# Patient Record
Sex: MALE | Race: BLACK OR AFRICAN AMERICAN | NOT HISPANIC OR LATINO | Employment: OTHER | URBAN - METROPOLITAN AREA
[De-identification: names, ages, dates, MRNs, and addresses within clinical notes are randomized per-mention and may not be internally consistent; named-entity substitution may affect disease eponyms.]

---

## 2017-07-09 ENCOUNTER — HOSPITAL ENCOUNTER (OUTPATIENT)
Facility: HOSPITAL | Age: 82
Discharge: HOME | End: 2017-07-10
Attending: EMERGENCY MEDICINE | Admitting: HOSPITALIST
Payer: MEDICARE

## 2017-07-09 DIAGNOSIS — R79.89 ELEVATED BRAIN NATRIURETIC PEPTIDE (BNP) LEVEL: ICD-10-CM

## 2017-07-09 DIAGNOSIS — J81.0 ACUTE PULMONARY EDEMA: ICD-10-CM

## 2017-07-09 DIAGNOSIS — R79.89 ELEVATED TROPONIN: Primary | ICD-10-CM

## 2017-07-09 DIAGNOSIS — I50.9 ACUTE CHF (CONGESTIVE HEART FAILURE): ICD-10-CM

## 2017-07-09 DIAGNOSIS — I50.31 ACUTE DIASTOLIC CONGESTIVE HEART FAILURE: ICD-10-CM

## 2017-07-09 DIAGNOSIS — R05.9 COUGH: ICD-10-CM

## 2017-07-09 LAB
ALBUMIN SERPL BCP-MCNC: 3.4 G/DL
ALP SERPL-CCNC: 113 U/L
ALT SERPL W/O P-5'-P-CCNC: 9 U/L
AMYLASE SERPL-CCNC: 78 U/L
ANION GAP SERPL CALC-SCNC: 11 MMOL/L
AST SERPL-CCNC: 17 U/L
BASOPHILS # BLD AUTO: 0.01 K/UL
BASOPHILS NFR BLD: 0.2 %
BILIRUB SERPL-MCNC: 1.2 MG/DL
BNP SERPL-MCNC: 569 PG/ML
BUN SERPL-MCNC: 16 MG/DL
CALCIUM SERPL-MCNC: 10.1 MG/DL
CHLORIDE SERPL-SCNC: 105 MMOL/L
CO2 SERPL-SCNC: 26 MMOL/L
CREAT SERPL-MCNC: 1.1 MG/DL
DIFFERENTIAL METHOD: ABNORMAL
EOSINOPHIL # BLD AUTO: 0 K/UL
EOSINOPHIL NFR BLD: 0.6 %
ERYTHROCYTE [DISTWIDTH] IN BLOOD BY AUTOMATED COUNT: 14 %
EST. GFR  (AFRICAN AMERICAN): >60 ML/MIN/1.73 M^2
EST. GFR  (NON AFRICAN AMERICAN): 58 ML/MIN/1.73 M^2
GLUCOSE SERPL-MCNC: 111 MG/DL
HCT VFR BLD AUTO: 41 %
HGB BLD-MCNC: 13.2 G/DL
LIPASE SERPL-CCNC: 6 U/L
LYMPHOCYTES # BLD AUTO: 0.8 K/UL
LYMPHOCYTES NFR BLD: 16.9 %
MCH RBC QN AUTO: 30.2 PG
MCHC RBC AUTO-ENTMCNC: 32.2 %
MCV RBC AUTO: 94 FL
MONOCYTES # BLD AUTO: 0.2 K/UL
MONOCYTES NFR BLD: 4.9 %
NEUTROPHILS # BLD AUTO: 3.8 K/UL
NEUTROPHILS NFR BLD: 77.2 %
PLATELET # BLD AUTO: 153 K/UL
PMV BLD AUTO: 11.5 FL
POCT GLUCOSE: 118 MG/DL (ref 70–110)
POTASSIUM SERPL-SCNC: 4.1 MMOL/L
PROT SERPL-MCNC: 7.4 G/DL
RBC # BLD AUTO: 4.37 M/UL
SODIUM SERPL-SCNC: 142 MMOL/L
TROPONIN I SERPL DL<=0.01 NG/ML-MCNC: 0.04 NG/ML
WBC # BLD AUTO: 4.86 K/UL

## 2017-07-09 PROCEDURE — 93005 ELECTROCARDIOGRAM TRACING: CPT

## 2017-07-09 PROCEDURE — 99284 EMERGENCY DEPT VISIT MOD MDM: CPT | Mod: 25

## 2017-07-09 PROCEDURE — 85025 COMPLETE CBC W/AUTO DIFF WBC: CPT

## 2017-07-09 PROCEDURE — 83690 ASSAY OF LIPASE: CPT

## 2017-07-09 PROCEDURE — 25000003 PHARM REV CODE 250: Performed by: EMERGENCY MEDICINE

## 2017-07-09 PROCEDURE — 96374 THER/PROPH/DIAG INJ IV PUSH: CPT

## 2017-07-09 PROCEDURE — 96361 HYDRATE IV INFUSION ADD-ON: CPT

## 2017-07-09 PROCEDURE — 63600175 PHARM REV CODE 636 W HCPCS: Performed by: EMERGENCY MEDICINE

## 2017-07-09 PROCEDURE — G0378 HOSPITAL OBSERVATION PER HR: HCPCS

## 2017-07-09 PROCEDURE — 82150 ASSAY OF AMYLASE: CPT

## 2017-07-09 PROCEDURE — 80053 COMPREHEN METABOLIC PANEL: CPT

## 2017-07-09 PROCEDURE — 83880 ASSAY OF NATRIURETIC PEPTIDE: CPT

## 2017-07-09 PROCEDURE — 84484 ASSAY OF TROPONIN QUANT: CPT

## 2017-07-09 RX ORDER — DOCUSATE SODIUM 100 MG/1
100 CAPSULE, LIQUID FILLED ORAL DAILY
Status: ON HOLD | COMMUNITY
End: 2018-05-10 | Stop reason: HOSPADM

## 2017-07-09 RX ORDER — IBUPROFEN 200 MG
16 TABLET ORAL
Status: DISCONTINUED | OUTPATIENT
Start: 2017-07-09 | End: 2017-07-10

## 2017-07-09 RX ORDER — SERTRALINE HYDROCHLORIDE 50 MG/1
25 TABLET, FILM COATED ORAL NIGHTLY
Status: ON HOLD | COMMUNITY
End: 2018-05-10 | Stop reason: HOSPADM

## 2017-07-09 RX ORDER — FUROSEMIDE 10 MG/ML
40 INJECTION INTRAMUSCULAR; INTRAVENOUS
Status: COMPLETED | OUTPATIENT
Start: 2017-07-09 | End: 2017-07-09

## 2017-07-09 RX ORDER — METOPROLOL SUCCINATE 50 MG/1
50 TABLET, EXTENDED RELEASE ORAL 2 TIMES DAILY
Status: ON HOLD | COMMUNITY
End: 2017-07-10 | Stop reason: SDUPTHER

## 2017-07-09 RX ORDER — ASPIRIN 325 MG
325 TABLET ORAL
Status: COMPLETED | OUTPATIENT
Start: 2017-07-09 | End: 2017-07-09

## 2017-07-09 RX ORDER — ACETAMINOPHEN 325 MG/1
650 TABLET ORAL EVERY 6 HOURS PRN
Status: DISCONTINUED | OUTPATIENT
Start: 2017-07-09 | End: 2017-07-10 | Stop reason: HOSPADM

## 2017-07-09 RX ORDER — ACETAMINOPHEN 325 MG/1
325 TABLET ORAL EVERY 6 HOURS PRN
Status: ON HOLD | COMMUNITY
End: 2018-05-10 | Stop reason: HOSPADM

## 2017-07-09 RX ORDER — GLUCAGON 1 MG
1 KIT INJECTION
Status: DISCONTINUED | OUTPATIENT
Start: 2017-07-09 | End: 2017-07-10

## 2017-07-09 RX ORDER — PRAVASTATIN SODIUM 80 MG/1
80 TABLET ORAL DAILY
Status: ON HOLD | COMMUNITY
End: 2018-03-05 | Stop reason: HOSPADM

## 2017-07-09 RX ORDER — METOPROLOL SUCCINATE 25 MG/1
50 TABLET, EXTENDED RELEASE ORAL
Status: COMPLETED | OUTPATIENT
Start: 2017-07-09 | End: 2017-07-09

## 2017-07-09 RX ORDER — MIRTAZAPINE 15 MG/1
15 TABLET, FILM COATED ORAL NIGHTLY
Status: ON HOLD | COMMUNITY
End: 2018-05-10 | Stop reason: HOSPADM

## 2017-07-09 RX ORDER — ONDANSETRON 2 MG/ML
4 INJECTION INTRAMUSCULAR; INTRAVENOUS EVERY 12 HOURS PRN
Status: DISCONTINUED | OUTPATIENT
Start: 2017-07-09 | End: 2017-07-10 | Stop reason: HOSPADM

## 2017-07-09 RX ORDER — INSULIN ASPART 100 [IU]/ML
0-5 INJECTION, SOLUTION INTRAVENOUS; SUBCUTANEOUS
Status: DISCONTINUED | OUTPATIENT
Start: 2017-07-09 | End: 2017-07-10

## 2017-07-09 RX ORDER — IBUPROFEN 200 MG
24 TABLET ORAL
Status: DISCONTINUED | OUTPATIENT
Start: 2017-07-09 | End: 2017-07-10

## 2017-07-09 RX ADMIN — METOPROLOL SUCCINATE 50 MG: 25 TABLET, EXTENDED RELEASE ORAL at 07:07

## 2017-07-09 RX ADMIN — ASPIRIN 325 MG ORAL TABLET 325 MG: 325 PILL ORAL at 07:07

## 2017-07-09 RX ADMIN — SODIUM CHLORIDE 500 ML: 0.9 INJECTION, SOLUTION INTRAVENOUS at 06:07

## 2017-07-09 RX ADMIN — FUROSEMIDE 40 MG: 10 INJECTION, SOLUTION INTRAMUSCULAR; INTRAVENOUS at 07:07

## 2017-07-09 NOTE — ED NOTES
Pt presents to ED via family with c/o emesis since this AM after vomiting started at breakfast. Pt stepdaughter states he has since been vomiting thick, clear mucus. Per family, pt has hx of cardiac problems, DM and dementia. [facility] and [facility] was about 2 months ago. Pt placed on cardiac monitor, bp cuff and continuous pulse ox. Pt is tachycardic on monitor at present.

## 2017-07-09 NOTE — ED PROVIDER NOTES
Encounter Date: 7/9/2017       History     Chief Complaint   Patient presents with    Emesis     since this AM after eating breakfast. family reports pt currently spitting up thick, clear mucus     89-year-old male with advanced dementia on palliative care, hypertension, and CHF presents with coughing up thick mucus and breakfast this morning.  His daughter states after eating breakfast he began coughing up lots of mucus.  Symptoms have been constant and severe all day.  The patient has advanced dementia and is unable to give me further history at this time.          Review of patient's allergies indicates:  No Known Allergies  Past Medical History:   Diagnosis Date    CHF (congestive heart failure)     Hypertension      History reviewed. No pertinent surgical history.  History reviewed. No pertinent family history.  Social History   Substance Use Topics    Smoking status: Never Smoker    Smokeless tobacco: Not on file    Alcohol use No     Review of Systems   Unable to perform ROS: Dementia   Respiratory: Positive for cough.        Physical Exam     Initial Vitals [07/09/17 1717]   BP Pulse Resp Temp SpO2   (!) 174/86 (!) 127 20 98.4 °F (36.9 °C) 99 %      MAP       115.33         Physical Exam    Nursing note and vitals reviewed.  Constitutional: He appears well-developed and well-nourished. No distress.   HENT:   Head: Normocephalic and atraumatic.   Mouth/Throat: Oropharynx is clear and moist.   Eyes: Conjunctivae are normal.   Neck: Normal range of motion.   Cardiovascular: Regular rhythm. Tachycardia present.    Abdominal: Bowel sounds are normal. He exhibits no distension.   Course breath sounds, no respiratory distress   Musculoskeletal: Normal range of motion.   Neurological: He is alert. He has normal strength.   Skin: Skin is warm and dry.   Psychiatric:   Calm and cooperative but confused         ED Course   Procedures  Labs Reviewed   CBC W/ AUTO DIFFERENTIAL - Abnormal; Notable for the following:         Result Value    RBC 4.37 (*)     Hemoglobin 13.2 (*)     Lymph # 0.8 (*)     Mono # 0.2 (*)     Gran% 77.2 (*)     Lymph% 16.9 (*)     All other components within normal limits   COMPREHENSIVE METABOLIC PANEL - Abnormal; Notable for the following:     Glucose 111 (*)     Albumin 3.4 (*)     Total Bilirubin 1.2 (*)     ALT 9 (*)     eGFR if non  58 (*)     All other components within normal limits   TROPONIN I - Abnormal; Notable for the following:     Troponin I 0.044 (*)     All other components within normal limits   B-TYPE NATRIURETIC PEPTIDE - Abnormal; Notable for the following:      (*)     All other components within normal limits   AMYLASE   LIPASE     EKG Readings: (Independently Interpreted)   Initial Reading: No STEMI. Rhythm: Sinus Tachycardia. Heart Rate: 126. Conduction: RBBB. ST Segment Depression: V3 and V4. T Waves Flipped: III and AVF. Clinical Impression: Sinus Tachycardia          Medical Decision Making:   History:   I obtained history from: someone other than patient.  Independently Interpreted Test(s):   I have ordered and independently interpreted EKG Reading(s) - see prior notes  Clinical Tests:   Lab Tests: Ordered and Reviewed  Radiological Study: Ordered and Reviewed  Medical Tests: Reviewed and Ordered  ED Management:  82-year-old male with a history of advanced dementia on palliative care was brought in by his daughter for cough.  He is having coughing and bringing up a lot of mucus since breakfast this morning the patient was a course on exam and coughing.  He is not in any distress.  He was tachycardic on arrival and EKG showed sinus tachycardia.  This resolved without intervention.  His BP is also elevated and he is due for his evening metoprolol dose so that was given in the ER.    Patient is remarkable for elevated troponin and elevated BNP.  He has some pulmonary edema on his chest x-ray.  I feel his cough and mucus production may be due to this  pulmonary edema.  Given Lasix 40 mg IV.  The patient will be admitted to [facility] hospitalist for further evaluation.  Other:   I have discussed this case with another health care provider.                   ED Course     Clinical Impression:   The primary encounter diagnosis was Elevated troponin. Diagnoses of Cough, Elevated brain natriuretic peptide (BNP) level, and Acute pulmonary edema were also pertinent to this visit.                           Nieves Ferris MD  07/09/17 1756

## 2017-07-10 PROBLEM — I51.7 LEFT ATRIAL ENLARGEMENT: Status: ACTIVE | Noted: 2017-07-10

## 2017-07-10 PROBLEM — F03.C0 ADVANCED DEMENTIA: Status: ACTIVE | Noted: 2017-07-10

## 2017-07-10 PROBLEM — R79.89 ELEVATED TROPONIN: Status: ACTIVE | Noted: 2017-07-10

## 2017-07-10 PROBLEM — I10 ESSENTIAL HYPERTENSION: Status: ACTIVE | Noted: 2017-07-10

## 2017-07-10 PROBLEM — I50.31 ACUTE DIASTOLIC CONGESTIVE HEART FAILURE: Status: ACTIVE | Noted: 2017-07-09

## 2017-07-10 PROBLEM — E78.5 HYPERLIPEMIA: Status: ACTIVE | Noted: 2017-07-10

## 2017-07-10 LAB
ANION GAP SERPL CALC-SCNC: 12 MMOL/L
APTT BLDCRRT: 31.5 SEC
BUN SERPL-MCNC: 15 MG/DL
CALCIUM SERPL-MCNC: 10.3 MG/DL
CHLORIDE SERPL-SCNC: 103 MMOL/L
CHOLEST/HDLC SERPL: 3.4 {RATIO}
CO2 SERPL-SCNC: 28 MMOL/L
CREAT SERPL-MCNC: 1 MG/DL
DIASTOLIC DYSFUNCTION: YES
EST. GFR  (AFRICAN AMERICAN): >60 ML/MIN/1.73 M^2
EST. GFR  (NON AFRICAN AMERICAN): >60 ML/MIN/1.73 M^2
ESTIMATED AVG GLUCOSE: 105 MG/DL
ESTIMATED PA SYSTOLIC PRESSURE: 43.45
GLUCOSE SERPL-MCNC: 114 MG/DL
HBA1C MFR BLD HPLC: 5.3 %
HDL/CHOLESTEROL RATIO: 29.5 %
HDLC SERPL-MCNC: 193 MG/DL
HDLC SERPL-MCNC: 57 MG/DL
INR PPP: 1.2
LDLC SERPL CALC-MCNC: 123.4 MG/DL
MAGNESIUM SERPL-MCNC: 2.2 MG/DL
NONHDLC SERPL-MCNC: 136 MG/DL
POCT GLUCOSE: 110 MG/DL (ref 70–110)
POCT GLUCOSE: 122 MG/DL (ref 70–110)
POCT GLUCOSE: 137 MG/DL (ref 70–110)
POTASSIUM SERPL-SCNC: 3.9 MMOL/L
PROTHROMBIN TIME: 12.3 SEC
RETIRED EF AND QEF - SEE NOTES: 55 (ref 55–65)
SODIUM SERPL-SCNC: 143 MMOL/L
TRICUSPID VALVE REGURGITATION: ABNORMAL
TRIGL SERPL-MCNC: 63 MG/DL
TROPONIN I SERPL DL<=0.01 NG/ML-MCNC: 0.05 NG/ML
TSH SERPL DL<=0.005 MIU/L-ACNC: 1.16 UIU/ML

## 2017-07-10 PROCEDURE — 63600175 PHARM REV CODE 636 W HCPCS: Performed by: NURSE PRACTITIONER

## 2017-07-10 PROCEDURE — 36415 COLL VENOUS BLD VENIPUNCTURE: CPT

## 2017-07-10 PROCEDURE — 85730 THROMBOPLASTIN TIME PARTIAL: CPT

## 2017-07-10 PROCEDURE — 83735 ASSAY OF MAGNESIUM: CPT

## 2017-07-10 PROCEDURE — 80048 BASIC METABOLIC PNL TOTAL CA: CPT

## 2017-07-10 PROCEDURE — 84484 ASSAY OF TROPONIN QUANT: CPT | Mod: 91

## 2017-07-10 PROCEDURE — 80061 LIPID PANEL: CPT

## 2017-07-10 PROCEDURE — 83036 HEMOGLOBIN GLYCOSYLATED A1C: CPT

## 2017-07-10 PROCEDURE — 25000003 PHARM REV CODE 250: Performed by: NURSE PRACTITIONER

## 2017-07-10 PROCEDURE — 84443 ASSAY THYROID STIM HORMONE: CPT

## 2017-07-10 PROCEDURE — 25000003 PHARM REV CODE 250: Performed by: HOSPITALIST

## 2017-07-10 PROCEDURE — 93306 TTE W/DOPPLER COMPLETE: CPT

## 2017-07-10 PROCEDURE — 94761 N-INVAS EAR/PLS OXIMETRY MLT: CPT

## 2017-07-10 PROCEDURE — G0378 HOSPITAL OBSERVATION PER HR: HCPCS

## 2017-07-10 PROCEDURE — 85610 PROTHROMBIN TIME: CPT

## 2017-07-10 RX ORDER — DOCUSATE SODIUM 100 MG/1
100 CAPSULE, LIQUID FILLED ORAL DAILY
Status: DISCONTINUED | OUTPATIENT
Start: 2017-07-11 | End: 2017-07-10 | Stop reason: HOSPADM

## 2017-07-10 RX ORDER — FUROSEMIDE 40 MG/1
40 TABLET ORAL DAILY PRN
Qty: 30 TABLET | Refills: 0 | Status: ON HOLD | OUTPATIENT
Start: 2017-07-10 | End: 2018-05-10 | Stop reason: HOSPADM

## 2017-07-10 RX ORDER — HYDROCODONE BITARTRATE AND ACETAMINOPHEN 5; 325 MG/1; MG/1
1 TABLET ORAL EVERY 8 HOURS PRN
Status: DISCONTINUED | OUTPATIENT
Start: 2017-07-10 | End: 2017-07-10 | Stop reason: HOSPADM

## 2017-07-10 RX ORDER — MIRTAZAPINE 15 MG/1
15 TABLET, FILM COATED ORAL NIGHTLY
Status: DISCONTINUED | OUTPATIENT
Start: 2017-07-10 | End: 2017-07-10 | Stop reason: HOSPADM

## 2017-07-10 RX ORDER — HYDROCODONE BITARTRATE AND ACETAMINOPHEN 5; 325 MG/1; MG/1
1 TABLET ORAL EVERY 8 HOURS PRN
Status: ON HOLD | COMMUNITY
End: 2018-05-10 | Stop reason: HOSPADM

## 2017-07-10 RX ORDER — FAMOTIDINE 20 MG/1
20 TABLET, FILM COATED ORAL 2 TIMES DAILY
Status: DISCONTINUED | OUTPATIENT
Start: 2017-07-10 | End: 2017-07-10

## 2017-07-10 RX ORDER — SERTRALINE HYDROCHLORIDE 25 MG/1
25 TABLET, FILM COATED ORAL NIGHTLY
Status: DISCONTINUED | OUTPATIENT
Start: 2017-07-10 | End: 2017-07-10 | Stop reason: HOSPADM

## 2017-07-10 RX ORDER — HYDRALAZINE HYDROCHLORIDE 20 MG/ML
10 INJECTION INTRAMUSCULAR; INTRAVENOUS EVERY 6 HOURS PRN
Status: DISCONTINUED | OUTPATIENT
Start: 2017-07-10 | End: 2017-07-10 | Stop reason: HOSPADM

## 2017-07-10 RX ORDER — METOPROLOL TARTRATE 50 MG/1
50 TABLET ORAL 2 TIMES DAILY
Status: DISCONTINUED | OUTPATIENT
Start: 2017-07-10 | End: 2017-07-10 | Stop reason: HOSPADM

## 2017-07-10 RX ORDER — FAMOTIDINE 20 MG/1
20 TABLET, FILM COATED ORAL DAILY
Status: DISCONTINUED | OUTPATIENT
Start: 2017-07-10 | End: 2017-07-10 | Stop reason: HOSPADM

## 2017-07-10 RX ORDER — METOPROLOL TARTRATE 50 MG/1
50 TABLET ORAL 2 TIMES DAILY
Status: ON HOLD | COMMUNITY
End: 2018-03-05

## 2017-07-10 RX ORDER — PRAVASTATIN SODIUM 40 MG/1
80 TABLET ORAL DAILY
Status: DISCONTINUED | OUTPATIENT
Start: 2017-07-10 | End: 2017-07-10 | Stop reason: HOSPADM

## 2017-07-10 RX ORDER — FUROSEMIDE 10 MG/ML
20 INJECTION INTRAMUSCULAR; INTRAVENOUS DAILY
Status: DISCONTINUED | OUTPATIENT
Start: 2017-07-10 | End: 2017-07-10 | Stop reason: HOSPADM

## 2017-07-10 RX ADMIN — METOPROLOL TARTRATE 50 MG: 50 TABLET ORAL at 08:07

## 2017-07-10 RX ADMIN — HYDRALAZINE HYDROCHLORIDE 10 MG: 20 INJECTION INTRAMUSCULAR; INTRAVENOUS at 01:07

## 2017-07-10 RX ADMIN — HYDRALAZINE HYDROCHLORIDE 10 MG: 20 INJECTION INTRAMUSCULAR; INTRAVENOUS at 08:07

## 2017-07-10 RX ADMIN — FAMOTIDINE 20 MG: 20 TABLET, FILM COATED ORAL at 08:07

## 2017-07-10 RX ADMIN — SERTRALINE HYDROCHLORIDE 25 MG: 25 TABLET ORAL at 04:07

## 2017-07-10 RX ADMIN — MIRTAZAPINE 15 MG: 15 TABLET, FILM COATED ORAL at 08:07

## 2017-07-10 RX ADMIN — FUROSEMIDE 20 MG: 10 INJECTION, SOLUTION INTRAMUSCULAR; INTRAVENOUS at 08:07

## 2017-07-10 RX ADMIN — SERTRALINE HYDROCHLORIDE 25 MG: 25 TABLET ORAL at 08:07

## 2017-07-10 RX ADMIN — MIRTAZAPINE 15 MG: 15 TABLET, FILM COATED ORAL at 04:07

## 2017-07-10 RX ADMIN — PRAVASTATIN SODIUM 80 MG: 40 TABLET ORAL at 08:07

## 2017-07-10 NOTE — SUBJECTIVE & OBJECTIVE
Interval History: Pt resting comfortably on room air. No cough. No mucous noted. Discussed with daughter-in-law, Mary Anne Greer ([phone]).      Review of Systems   Unable to perform ROS: Dementia     Objective:     Vital Signs (Most Recent):  Temp: 98 °F (36.7 °C) (07/10/17 1124)  Pulse: 82 (07/10/17 1600)  Resp: 20 (07/10/17 1124)  BP: (!) 143/67 (07/10/17 1124)  SpO2: 97 % (07/10/17 1600) Vital Signs (24h Range):  Temp:  [96.8 °F (36 °C)-98.6 °F (37 °C)] 98 °F (36.7 °C)  Pulse:  [] 82  Resp:  [12-23] 20  SpO2:  [94 %-100 %] 97 %  BP: (100-193)/() 143/67     Weight: 57.3 kg (126 lb 5 oz)  Body mass index is 19.83 kg/m².    Intake/Output Summary (Last 24 hours) at 07/10/17 1622  Last data filed at 07/10/17 0956   Gross per 24 hour   Intake              125 ml   Output              606 ml   Net             -481 ml      Physical Exam   Constitutional: No distress.   Cardiovascular: Normal rate and regular rhythm.    Pulmonary/Chest: Effort normal and breath sounds normal. No respiratory distress. He has no wheezes. He has no rales.   Musculoskeletal: He exhibits edema.   Trace edema in bilateral feet.    Psychiatric:   Sleeping comfortably.    Nursing note and vitals reviewed.      Significant Labs:   A1C:   Recent Labs  Lab 07/10/17  0451   HGBA1C 5.3     BMP:   Recent Labs  Lab 07/10/17  0450   *      K 3.9      CO2 28   BUN 15   CREATININE 1.0   CALCIUM 10.3   MG 2.2     Lipid Panel:   Recent Labs  Lab 07/10/17  0450   CHOL 193   HDL 57   LDLCALC 123.4   TRIG 63   CHOLHDL 29.5     Troponin:   Recent Labs  Lab 07/10/17  0010 07/10/17  0451 07/10/17  1223   TROPONINI 0.053* 0.049* 0.054*     TSH:   Recent Labs  Lab 07/10/17  0450   TSH 1.161       Significant ImaginD Echo 7/10/2017  CONCLUSIONS     1 - Severe left atrial enlargement.     2 - Concentric remodeling.     3 - No wall motion abnormalities.     4 - Normal left ventricular systolic function (EF 55-60%).     5 -  Impaired LV relaxation, elevated LAP (grade 2 diastolic dysfunction).     6 - Normal right ventricular systolic function .     7 - Pulmonary hypertension. The estimated PA systolic pressure is 43 mmHg.     8 - Mild tricuspid regurgitation.

## 2017-07-10 NOTE — HOSPITAL COURSE
2D Echo 7/10/17: EF 55%, + diastolic dysfunction, + pulmonary HTN.  Pt resting comfortably on room air all day with no coughing documented.  Discussed with pt's daughter in law Mary Anne that she may give him furosemide as needed for similar symptoms in the future.  I also confirmed that he is on Palliative, not Hospice, Care and the family was told to go to nearest ED as needed.  Daughter-in-law will notify pt's PCP and the Palliative Care Agency of his admission and new diagnosis of diastolic congestive heart failure.

## 2017-07-10 NOTE — ASSESSMENT & PLAN NOTE
DNR  Patient lives with his son and daughter-in-law who confirm that patient is DNR. [facility] Continue home Mirtazapine 15 mg every evening, sertraline 25 mg daily

## 2017-07-10 NOTE — DISCHARGE SUMMARY
[facility] Hospital Medicine  Discharge Summary      Patient Name: Raghav Greer  MRN: [id number]  Admission Date: 7/9/2017  Hospital Length of Stay: 0 days  Discharge Date and Time:  07/10/2017 6:22 PM  Attending Physician: Daniel Schaefer MD   Discharging Provider: Mabel Del Castillo PA-C  Primary Care Provider: No primary care provider on file.      HPI:   Mr. Raghav Greer is 89-year-old male with advanced dementia on palliative care, DNR (Advanced Directive on Chart), Diabetes, hypertension, hyperlipemia. He lives with his son and daughter-n-law.  [facility] and his last check up was about 2 months ago.    He presented to [facility] ED on 7/9/2017 with complaints of coughing up thick mucus beginning at breakfast. ED workup revealed a troponin of 0.044 and  with mild pulmonary congestion on chest x-ray.  All other labs essentially unremarkable.  He was admitted to [facility] Observation, for Congestive Heart Failure and elevated Troponin.    * No surgery found *      Indwelling Lines/Drains at time of discharge:   Lines/Drains/Airways          No matching active lines, drains, or airways        Hospital Course:   2D Echo 7/10/17: EF 55%, + diastolic dysfunction, + pulmonary HTN.  Pt resting comfortably on room air all day with no coughing documented.  Discussed with pt's daughter in law Mary Anne that she may give him furosemide as needed for similar symptoms in the future.  I also confirmed that he is on Palliative, not Hospice, Care and the family was told to go to nearest ED as needed.  Daughter-in-law will notify pt's PCP and the Palliative Care Agency of his admission and new diagnosis of diastolic congestive heart failure.      Consults:   Consults         Status Ordering Provider     Inpatient consult to Social Work  Once     Provider:  (Not yet assigned)    Acknowledged DUSTY RENEE          Significant Diagnostic Studies: Labs:   CMP    Recent Labs  Lab 07/09/17  1758 07/10/17  0450    143   K 4.1 3.9    103   CO2 26 28   * 114*   BUN 16 15   CREATININE 1.1 1.0   CALCIUM 10.1 10.3   PROT 7.4  --    ALBUMIN 3.4*  --    BILITOT 1.2*  --    ALKPHOS 113  --    AST 17  --    ALT 9*  --    ANIONGAP 11 12   ESTGFRAFRICA >60 >60   EGFRNONAA 58* >60   , CBC   Recent Labs  Lab 07/09/17  1758   WBC 4.86   HGB 13.2*   HCT 41.0      , Lipid Panel   Lab Results   Component Value Date    CHOL 193 07/10/2017    HDL 57 07/10/2017    LDLCALC 123.4 07/10/2017    TRIG 63 07/10/2017    CHOLHDL 29.5 07/10/2017   , Troponin   Recent Labs  Lab 07/10/17  1223   TROPONINI 0.054*   , A1C:   Recent Labs  Lab 07/10/17  0451   HGBA1C 5.3     TSH 1.161    Imaging Results          X-Ray Chest 1 View (Final result)  Result time 07/09/17 18:38:39    Final result by Nirav Turner MD (07/09/17 18:38:39)                 Impression:       1.  Cardiomegaly with enlargement of the pulmonary arteries and changes of mild pulmonary edema.     2.  No focal consolidations to suggest lobar pneumonia.              Electronically signed by: NIRAV TURNER MD  Date:     07/09/17  Time:    18:38              Narrative:    Exam: [id number]  07/09/17  18:03:00 IMG34 ([facility]) : XR CHEST 1 VIEW    Technique:    Single frontal chest x-ray    Comparison:     None     Findings:      Monitoring EKG leads are present.  The trachea is unremarkable.  There is enlargement of the cardiomediastinal silhouette.  There are calcifications of the aortic knob.  There is enlargement of the pulmonary arteries.  The hemidiaphragms are unremarkable.  There is no evidence of free air beneath the hemidiaphragms.  There are no pleural effusions.  There is no evidence of a pneumothorax.  There is no evidence of pneumomediastinum. No airspace opacities are present.  There is prominence of the pulmonary interstitium. The patient is status post median sternotomy.  There are degenerative changes in the  osseous structures.                            2D Echo 7/10/2017  CONCLUSIONS     1 - Severe left atrial enlargement.     2 - Concentric remodeling.     3 - No wall motion abnormalities.     4 - Normal left ventricular systolic function (EF 55-60%).     5 - Impaired LV relaxation, elevated LAP (grade 2 diastolic dysfunction).     6 - Normal right ventricular systolic function .     7 - Pulmonary hypertension. The estimated PA systolic pressure is 43 mmHg.     8 - Mild tricuspid regurgitation.     Pending Diagnostic Studies:     None        Final Active Diagnoses:    Diagnosis Date Noted POA    PRINCIPAL PROBLEM:  Acute diastolic congestive heart failure [I50.31] 07/09/2017 Yes    Advanced dementia [F03.90] 07/10/2017 Yes    Hyperlipemia [E78.5] 07/10/2017 Yes    Essential hypertension [I10] 07/10/2017 Yes    Elevated troponin [R74.8] 07/10/2017 Yes    Left atrial enlargement [I51.7] 07/10/2017 Yes      Problems Resolved During this Admission:    Diagnosis Date Noted Date Resolved POA      Elevated troponin    Troponin on admit 0.044. Trended 0.053 >> 0.049 >> 0.054.  Daughter-in-law denies that he looked in pain or clutched his chest.  No STEMI. Monitored on telemetry with no events.  Likely elevated 2/2 demand ischemia from heart failure.         Essential hypertension    Continue home metoprolol tartrate 50 mg BID           Hyperlipemia    Continue home pravastatin 80 mg daily. Lipid panel:  , HDL 57, , TG 63.           Advanced dementia    DNR  Patient lives with his son and daughter-in-law who confirm that patient is DNR. [facility] Continue home Mirtazapine 15 mg every evening, sertraline 25 mg daily          * Acute diastolic congestive heart failure    Echo confirms diastolic dysfunction and pulmonary HTN.  Patient presented with 1 day of coughing up copious clear-white sputum.  with mild pulmonary congestion on chest x-ray, mild crackles on  exam. He was not on diuretics at home.  Symptoms improved with IV lasix.  Will discharge with PO Lasix 20 mg daily as needed for cough, dyspnea or weight gain.                  Discharged Condition: stable    Disposition: Home or Self Care    Follow Up:  Follow-up Information     [facility]    Why:  as scheduled.               Patient Instructions:     Diet general   Order Specific Question Answer Comments   Na restriction, if any: 2gNa      Activity as tolerated     Call MD for:  difficulty breathing or increased cough       Medications:  Reconciled Home Medications:   Current Discharge Medication List      START taking these medications    Details   furosemide (LASIX) 40 MG tablet Take 1 tablet (40 mg total) by mouth daily as needed (cough with copious white mucous).  Qty: 30 tablet, Refills: 0    Associated Diagnoses: Acute diastolic congestive heart failure         CONTINUE these medications which have NOT CHANGED    Details   acetaminophen (TYLENOL) 325 MG tablet Take 325 mg by mouth every 6 (six) hours as needed for Pain.      docusate sodium (COLACE) 100 MG capsule Take 100 mg by mouth once daily at 6am.      hydrocodone-acetaminophen 5-325mg (NORCO) 5-325 mg per tablet Take 1 tablet by mouth every 8 (eight) hours as needed for Pain.      metoprolol tartrate (LOPRESSOR) 50 MG tablet Take 50 mg by mouth 2 (two) times daily.      mirtazapine (REMERON) 15 MG tablet Take 15 mg by mouth every evening.      pravastatin (PRAVACHOL) 80 MG tablet Take 80 mg by mouth once daily.      ranitidine (ZANTAC) 150 MG tablet Take 150 mg by mouth 2 (two) times daily.      sertraline (ZOLOFT) 50 MG tablet Take 25 mg by mouth every evening.            Time spent on the discharge of patient: 50 minutes    Mabel Del Castillo PA-C  Department of Hospital Medicine  [facility]  Pager: [phone]    Attending: Daniel Schaefer MD

## 2017-07-10 NOTE — PLAN OF CARE
Problem: Patient Care Overview  Goal: Plan of Care Review  Outcome: Outcome(s) achieved Date Met: 07/10/17  Pt on RA with sats of 95%.  Will continue to monitor.

## 2017-07-10 NOTE — PLAN OF CARE
TN spoke with son and daughter in law (who is coming to pick patient up).  They are going to call their [facility] [employer] [facility] once they arrive home to restart palliative care.     07/10/17 1716   Final Note   Assessment Type Final Discharge Note   Discharge Disposition Home-Health   Discharge planning education complete? Yes   What phone number can be called within the next 1-3 days to see how you are doing after discharge? [phone]   Hospital Follow Up  Appt(s) scheduled? Yes   Discharge plans and expectations educations in teach back method with documentation complete? Yes   Offered [facility] Bedside Delivery? n/a   Discharge/Hospital Encounter Summary to (non-[facility]) PCP n/a   Referral to Outpatient Case Management complete? No   Referral to / orders for Home Health Complete? n/a   Any social issues identified prior to discharge? No   Did you assess the readiness or willingness of the family or caregiver to support self management of care? Yes   Right Care Referral Info   Post Acute Recommendation Home-care

## 2017-07-10 NOTE — SUBJECTIVE & OBJECTIVE
Past Medical History:   Diagnosis Date    CHF (congestive heart failure)     Diabetes mellitus     Hypertension        History reviewed. No pertinent surgical history.    Review of patient's allergies indicates:  No Known Allergies    No current facility-administered medications on file prior to encounter.      No current outpatient prescriptions on file prior to encounter.     Family History     None        Social History Main Topics    Smoking status: Unknown If Ever Smoked    Smokeless tobacco: Not on file      Comment: Pt unable to answer questions.    Alcohol use No      Comment: Pt unable to answer questions.    Drug use: Unknown      Comment: Pt unable to answer questions.    Sexual activity: Not on file      Comment: Pt unable to answer questions.     Review of Systems   Unable to perform ROS: Dementia (No Family at Bedside at time of exam)     Objective:     Vital Signs (Most Recent):  Temp: 96.8 °F (36 °C) (07/10/17 0010)  Pulse: 82 (07/10/17 0010)  Resp: 16 (07/10/17 0010)  BP: (!) 191/88 (07/10/17 0010)  SpO2: 96 % (07/09/17 2330) Vital Signs (24h Range):  Temp:  [96.8 °F (36 °C)-98.6 °F (37 °C)] 96.8 °F (36 °C)  Pulse:  [] 82  Resp:  [12-23] 16  SpO2:  [96 %-100 %] 96 %  BP: (147-193)/() 191/88     Weight: 57.3 kg (126 lb 5 oz)  Body mass index is 19.83 kg/m².    Physical Exam   Constitutional: He is oriented to person, place, and time. He appears cachectic. No distress. Nasal cannula in place.   HENT:   Head: Normocephalic and atraumatic.   Mouth/Throat: No oropharyngeal exudate.   Eyes: Conjunctivae are normal. Pupils are equal, round, and reactive to light. No scleral icterus.   Neck: Neck supple. No tracheal deviation present.   Cardiovascular: Normal rate, regular rhythm, normal heart sounds and intact distal pulses.    No murmur heard.  Pulmonary/Chest: Effort normal. No respiratory distress. He has no wheezes. He has rales (Fine Rales at bases). He exhibits no tenderness.    Abdominal: Soft. Bowel sounds are normal. He exhibits no distension and no mass. There is no tenderness.   Musculoskeletal: He exhibits no edema or deformity.   Neurological: He is alert and oriented to person, place, and time.   Skin: Skin is warm and dry. Capillary refill takes 2 to 3 seconds. No rash noted. He is not diaphoretic. No erythema. No pallor.   Psychiatric: His affect is labile. He is agitated. Cognition and memory are impaired. He expresses inappropriate judgment.   Nursing note and vitals reviewed.       Significant Labs:   CBC:   Recent Labs  Lab 07/09/17 1758   WBC 4.86   HGB 13.2*   HCT 41.0        CMP:   Recent Labs  Lab 07/09/17 1758      K 4.1      CO2 26   *   BUN 16   CREATININE 1.1   CALCIUM 10.1   PROT 7.4   ALBUMIN 3.4*   BILITOT 1.2*   ALKPHOS 113   AST 17   ALT 9*   ANIONGAP 11   EGFRNONAA 58*     POCT Glucose:   Recent Labs  Lab 07/09/17  2233   POCTGLUCOSE 118*     Troponin:   Recent Labs  Lab 07/09/17  1758 07/10/17  0010   TROPONINI 0.044* 0.053*       Significant Imaging: I have reviewed all pertinent imaging results/findings within the past 24 hours.   Imaging Results          X-Ray Chest 1 View (Final result)  Result time 07/09/17 18:38:39    Final result by Nirav Turner MD (07/09/17 18:38:39)                 Impression:       1.  Cardiomegaly with enlargement of the pulmonary arteries and changes of mild pulmonary edema.     2.  No focal consolidations to suggest lobar pneumonia.              Electronically signed by: NIRAV TURNER MD  Date:     07/09/17  Time:    18:38              Narrative:    Exam: [id number]  07/09/17  18:03:00 IMG34 ([facility]) : XR CHEST 1 VIEW    Technique:    Single frontal chest x-ray    Comparison:     None     Findings:      Monitoring EKG leads are present.  The trachea is unremarkable.  There is enlargement of the cardiomediastinal silhouette.  There are calcifications of the aortic knob.  There is enlargement of the pulmonary  arteries.  The hemidiaphragms are unremarkable.  There is no evidence of free air beneath the hemidiaphragms.  There are no pleural effusions.  There is no evidence of a pneumothorax.  There is no evidence of pneumomediastinum. No airspace opacities are present.  There is prominence of the pulmonary interstitium. The patient is status post median sternotomy.  There are degenerative changes in the osseous structures.

## 2017-07-10 NOTE — ASSESSMENT & PLAN NOTE
Troponin on admit 0.044. Trended 0.053 >> 0.049 >> 0.054.  Daughter-in-law denies that he looked in pain or clutched his chest.  No STEMI. Monitor on telemetry.

## 2017-07-10 NOTE — ASSESSMENT & PLAN NOTE
DNR  Patient lives with his son and daughter-n-law who confirm that patient is DNR. [facility]. Patient is confused and somewhat combative at times.  His mood is labile.  --Continue home Mirtazapine 15 mg every evening, sertraline 25 mg daily

## 2017-07-10 NOTE — ASSESSMENT & PLAN NOTE
Troponin on admit 0.044. Trended 0.053 >> 0.049 >> 0.054.  Daughter-in-law denies that he looked in pain or clutched his chest.  No STEMI. Monitored on telemetry with no events.  Likely elevated 2/2 demand ischemia from heart failure.

## 2017-07-10 NOTE — ED NOTES
Educated pt. on medication indication, AE, SE, reactions and expected outcomes. Pt. verbalizes understanding. Agrees to treatment plan. Denies allergy. Will continue to monitor.

## 2017-07-10 NOTE — ED NOTES
Daughter in law at bedside. Updated daughter in law on pt's plan of care. Family verbalizes understanding. Family states pt. is a DNR and she is the pt' medical power of .

## 2017-07-10 NOTE — MEDICAL/APP STUDENT
"Progress Note  [facility]      Admit Date: 7/9/2017 (LOS 1 day)    SUBJECTIVE:     Follow-up For:  Acute diastolic congestive heart failure    HPI/Interval history: Mr. Greer is a 89 yr old cachectic black male with PMHX of dementia (on palliative care and is DNR), Diabetes, HTN, and CHF who presented with daughter-in-law to [facility] ED on 7/9 with chief complaint of vomiting large thick amounts of clear sputum which started after breakfast on 7/9. [facility] Gre[facility] and [facility] was approximately 2 months ago. BNP at 569 and troponin fluctuating between 0.044-0.054. Chest X ray on 7/9 revealed cardiomegaly with enlargement of pulmonary arteries and mild pulmonary edema.       Review of Systems:    ROS could not be performed as patient has dementia.    OBJECTIVE: Mr. Greer is a 89 yr old cachectic black male sleeping in supine position in the hospital bed with no signs of acute distress. He is afebrile with no pallor or diaphoresis noted. His respiratory rate and effort are not labored with no use of accessory muscles. Oropharynx is clear with no sputum or discharge is noted.      Vital Signs (Most Recent)  Temp: 98 °F (36.7 °C) (07/10/17 1124)  Pulse: 82 (07/10/17 1600)  Resp: 20 (07/10/17 1124)  BP: (!) 143/67 (07/10/17 1124)  SpO2: 97 % (07/10/17 1600)    Vital Signs Range (Last 24H):  Temp:  [96.8 °F (36 °C)-98.6 °F (37 °C)]   Pulse:  []   Resp:  [12-23]   BP: (100-193)/()   SpO2:  [94 %-100 %]     I & O (Last 24H):    Intake/Output Summary (Last 24 hours) at 07/10/17 1614  Last data filed at 07/10/17 0956   Gross per 24 hour   Intake              125 ml   Output              606 ml   Net             -481 ml       Estimated body mass index is 19.83 kg/m² as calculated from the following:    Height as of this encounter: 5' 6.93" (1.7 m).    Weight as of this encounter: 57.3 kg (126 lb 5 oz).    Physical Exam:  General: afebrile, cachectic, no diaphoresis " noted  HENT: Head:normocephalic, atraumatic.Nose: Nares normal. Septum midline. Mucosa normal. No drainage or sinus tenderness., no discharge. Throat: lips, mucosa, and tongue normal; teeth and gums normal and no throat erythema.  Neck: no carotid bruit, no JVD, supple, symmetrical, trachea midline, no JVD and thyroid not enlarged, symmetric, no tenderness/mass/nodules  Lungs:  clear to auscultation bilaterally and normal respiratory effort. No wheezes or stridor heard.  Cardiovascular: Heart: regular rate and rhythm, S1, S2 normal, no murmur, click, rub or gallop. Chest Wall: no tenderness. Extremities: no cyanosis or edema, or clubbing. Pulses:radial pulses 2+ and symmetric.  Skin: Skin color, texture, turgor normal. No rashes or lesions    Laboratory Data:    Troponin =  0.044-0.054    BNP= 569    Glucose 114      CBC:    Recent Labs  Lab 07/09/17  1758   WBC 4.86   HGB 13.2*   HCT 41.0      MCV 94     BMP:    Recent Labs  Lab 07/09/17  1758 07/10/17  0450   * 114*    143   K 4.1 3.9    103   CO2 26 28   BUN 16 15   CREATININE 1.1 1.0   CALCIUM 10.1 10.3   MG  --  2.2     LFTs:    Recent Labs  Lab 07/09/17  1758   ALT 9*   AST 17   ALKPHOS 113   BILITOT 1.2*   PROT 7.4   ALBUMIN 3.4*     COAGS:    Recent Labs  Lab 07/10/17  0450   INR 1.2   APTT 31.5     CARDIAC MARKERS:    Recent Labs  Lab 07/09/17  1758 07/10/17  0010 07/10/17  0451 07/10/17  1223   TROPONINI 0.044* 0.053* 0.049* 0.054*       Medications:  Medication list was reviewed and changes noted under Assessment/Plan.    Diagnostic Results:  Imaging Results          X-Ray Chest 1 View (Final result)  Result time 07/09/17 18:38:39    Final result by Nirav Turner MD (07/09/17 18:38:39)                 Impression:       1.  Cardiomegaly with enlargement of the pulmonary arteries and changes of mild pulmonary edema.     2.  No focal consolidations to suggest lobar pneumonia.              Electronically signed by: NIRAV TURNER  MD  Date:     07/09/17  Time:    18:38              Narrative:    Exam: [id number]  07/09/17  18:03:00 IMG34 ([facility]) : XR CHEST 1 VIEW    Technique:    Single frontal chest x-ray    Comparison:     None     Findings:      Monitoring EKG leads are present.  The trachea is unremarkable.  There is enlargement of the cardiomediastinal silhouette.  There are calcifications of the aortic knob.  There is enlargement of the pulmonary arteries.  The hemidiaphragms are unremarkable.  There is no evidence of free air beneath the hemidiaphragms.  There are no pleural effusions.  There is no evidence of a pneumothorax.  There is no evidence of pneumomediastinum. No airspace opacities are present.  There is prominence of the pulmonary interstitium. The patient is status post median sternotomy.  There are degenerative changes in the osseous structures.                              ASSESSMENT/PLAN:     Active Hospital Problems    Diagnosis  POA    1. Acute diastolic congestive heart failure [i50.31]  - Continue taking Furosemide 20 mg tablet daily for fluid overload. -Consider increasing dose to 40 mg.  Yes    2. Advanced dementia [f03.90]  - Already set up with palliative care and is DNR  Yes    3. Hyperlipidemia [e78.5]  - Continue pravastatin 80 mg daily for cholesterol management  Yes    4. Essential hypertension [i10]  -Continue Metoprolol 50 mg tablet BID  Yes    5. Elevated troponin [r74.8]  - Troponin remains within .044 and .054 and has not changed significantly since admission  Yes    6. Left atrial enlargement [i51.7]  - Continue diuretics and the above mentioned medications  Yes      Resolved Hospital Problems    Diagnosis Date Resolved POA   No resolved problems to display.         Preventive Measures: Mr. Greer has HTN and acute CHF, hence should limit sodium intake. Try to eat healthy foods such as vegetables and fruits.     Disposition: Mr. Greer is a 89 yr old cachectic black male with advanced dementia, HTN,  Diabetes, who presented with an acute episode of CHF. He is currently medically stable, with no remarkable lab findings and is ready to be discharged.    Time spent in care of patient (Greater than 1/2 spent in direct face to face contact: 15 minutes      Kerline Resendiz    [facility] PA STUDENT

## 2017-07-10 NOTE — ASSESSMENT & PLAN NOTE
Patient presenting with 1 day of coughing up copious clear-white sputum.  with mild pulmonary congestion on chest x-ray, mild crackles on exam. He is not on diuretics at home.  --Given Lasix 40 mg IV on the ED. Will continue with Lasix 20 IV Daily  --ECHO

## 2017-07-10 NOTE — PLAN OF CARE
TN spoke with daughter, states [facility].  She stated that once she gets the patient home, she will contact them to resume his existing home-health with palliative care as this is initiated through patient initiative.     07/10/17 1880   Discharge Assessment   Assessment Type Discharge Planning Assessment   Confirmed/corrected address and phone number on facesheet? Yes   Assessment information obtained from? Caregiver   Expected Length of Stay (days) 1   Communicated expected length of stay with patient/caregiver yes   Prior to hospitilization cognitive status: Unable to Assess   Prior to hospitalization functional status: Assistive Equipment;Needs Assistance   Current cognitive status: Unable to Assess   Current Functional Status: Assistive Equipment;Needs Assistance   Arrived From home or self-care  (with palliative care)   Lives With child(debbie), adult   Able to Return to Prior Arrangements yes   Is patient able to care for self after discharge? No   How many people do you have in your home that can help with your care after discharge? 2   Who are your caregiver(s) and their phone number(s)? Mary Anne Greer Daughter     [phone]    Patient's perception of discharge disposition home or selfcare   Patient currently being followed by outpatient case management? No   Patient currently receives home health services? Yes   Patient previously received home health services and would like to resume services if necessary? Yes   If yes, [facility] of [facility] provider[facility]   Does the patient currently use HME? Yes   Patient currently receives private duty nursing? No   Patient currently receives any other outside agency services? No   Equipment Currently Used at Home bedside commode;shower chair;wheelchair;hospital bed   Do you have any problems affording any of your prescribed medications? No   Is the patient taking medications as prescribed? yes   Do you  have any financial concerns preventing you from receiving the healthcare you need? No   Does the patient have transportation to healthcare appointments? Yes   Transportation Available family or friend will provide   On Dialysis? No   Does the patient receive services at the [facility]? No   Discharge Plan A Home with family;Home Health   Patient/Family In Agreement With Plan yes

## 2017-07-10 NOTE — HPI
Mr. Raghav Greer is 89-year-old male with advanced dementia on palliative care, DNR (Advanced Directive on Chart), Diabetes, hypertension, hyperlipemia. He lives with his son and daughter-n-law.  [facility] and his last check up was about 2 months ago.    He presented to [facility] ED on 7/9/2017 with complaints of coughing up thick mucus beginning at breakfast. ED workup revealed a troponin of 0.044 and  with mild pulmonary congestion on chest x-ray.  All other labs essentially unremarkable.  He was admitted to [facility] HM, Observation, for Congestive Heart Failure and elevated Troponin.

## 2017-07-10 NOTE — ASSESSMENT & PLAN NOTE
DNR  Patient lives with his son and daughter-n-law who presents with advanced directives states desire to be DNR. He is on Palliative Care at home.  Family was not present at the time of my exam.  Patient is confused and somewhat combative at times.  His mood is labile.  --Continue home Mirtazapine 15 mg every evening, sertraline 25 mg daily  --Case Management Consult for discharge planning

## 2017-07-10 NOTE — PLAN OF CARE
Problem: Patient Care Overview  Goal: Plan of Care Review  Outcome: Ongoing (interventions implemented as appropriate)  Pt on RA with sats of 94%. Will continue to monitor.

## 2017-07-10 NOTE — ASSESSMENT & PLAN NOTE
Echo confirms diastolic dysfunction and pulmonary HTN.  Patient presented with 1 day of coughing up copious clear-white sputum.  with mild pulmonary congestion on chest x-ray, mild crackles on exam. He was not on diuretics at home.  Symptoms improved with IV lasix.  Will discharge with PO Lasix 20 mg daily as needed for cough, dyspnea or weight gain.

## 2017-07-10 NOTE — ASSESSMENT & PLAN NOTE
Troponin on admit 0.044. No baseline available to compare to. Denies CP, but poor historian due to dementia.   No STEMI. Rhythm: Sinus Tachycardia. Heart Rate: 126. Conduction: RBBB. ST Segment Depression: V3 and V4. T Waves Flipped: III and AVF  Trend Troponin.

## 2017-07-10 NOTE — H&P
[facility] Hospital Medicine  History & Physical    Patient Name: Raghav Greer  MRN: [id number]  Admission Date: 7/9/2017  Attending Physician: Daniel Schaefer MD   Primary Care Provider: No primary care provider on file.         Patient information was obtained from past medical records and ER records.     Subjective:     Principal Problem:Acute CHF (congestive heart failure)    Chief Complaint:   Chief Complaint   Patient presents with    Emesis     since this AM after eating breakfast. family reports pt currently spitting up thick, clear mucus        HPI: Mr. Raghav Greer is 89-year-old male with advanced dementia on palliative care, DNR (Advaced Directives on Chart), Diabetes, hypertension, hyperlipemia, and CHF. He lives with his son and daughter-n-law.  [facility] and his last check up was about 2 months ago.    He presented to [facility] ED with complaints of presents coughing up thick mucus beginning at breakfast this morning. ED workup revealed a troponin of 0.044 and  with mild pulmonary congestion on chest x-ray.  All other labs essentially unremarkable.  He is admitted to [facility], Observation, for Congestive Heart Failure and elevated Troponin. He was given furosemide 40 mg in the emergency room asa 325 mg. I will continue gentle diuresis with Lasix 20 mg IV daily, as he is not currently on diuretics. I will trend his troponin and echo in the morning.       Past Medical History:   Diagnosis Date    CHF (congestive heart failure)     Diabetes mellitus     Hypertension        History reviewed. No pertinent surgical history.    Review of patient's allergies indicates:  No Known Allergies    No current facility-administered medications on file prior to encounter.      No current outpatient prescriptions on file prior to encounter.     Family History     None        Social History Main Topics    Smoking status: Unknown If Ever Smoked     Smokeless tobacco: Not on file      Comment: Pt unable to answer questions.    Alcohol use No      Comment: Pt unable to answer questions.    Drug use: Unknown      Comment: Pt unable to answer questions.    Sexual activity: Not on file      Comment: Pt unable to answer questions.     Review of Systems   Unable to perform ROS: Dementia (No Family at Bedside at time of exam)     Objective:     Vital Signs (Most Recent):  Temp: 96.8 °F (36 °C) (07/10/17 0010)  Pulse: 82 (07/10/17 0010)  Resp: 16 (07/10/17 0010)  BP: (!) 191/88 (07/10/17 0010)  SpO2: 96 % (07/09/17 2330) Vital Signs (24h Range):  Temp:  [96.8 °F (36 °C)-98.6 °F (37 °C)] 96.8 °F (36 °C)  Pulse:  [] 82  Resp:  [12-23] 16  SpO2:  [96 %-100 %] 96 %  BP: (147-193)/() 191/88     Weight: 57.3 kg (126 lb 5 oz)  Body mass index is 19.83 kg/m².    Physical Exam   Constitutional: He is oriented to person, place, and time. He appears cachectic. No distress. Nasal cannula in place.   HENT:   Head: Normocephalic and atraumatic.   Mouth/Throat: No oropharyngeal exudate.   Eyes: Conjunctivae are normal. Pupils are equal, round, and reactive to light. No scleral icterus.   Neck: Neck supple. No tracheal deviation present.   Cardiovascular: Normal rate, regular rhythm, normal heart sounds and intact distal pulses.    No murmur heard.  Pulmonary/Chest: Effort normal. No respiratory distress. He has no wheezes. He has rales (Fine Rales at bases). He exhibits no tenderness.   Abdominal: Soft. Bowel sounds are normal. He exhibits no distension and no mass. There is no tenderness.   Musculoskeletal: He exhibits no edema or deformity.   Neurological: He is alert and oriented to person, place, and time.   Skin: Skin is warm and dry. Capillary refill takes 2 to 3 seconds. No rash noted. He is not diaphoretic. No erythema. No pallor.   Psychiatric: His affect is labile. He is agitated. Cognition and memory are impaired. He expresses inappropriate judgment.    Nursing note and vitals reviewed.       Significant Labs:   CBC:   Recent Labs  Lab 07/09/17  1758   WBC 4.86   HGB 13.2*   HCT 41.0        CMP:   Recent Labs  Lab 07/09/17 1758      K 4.1      CO2 26   *   BUN 16   CREATININE 1.1   CALCIUM 10.1   PROT 7.4   ALBUMIN 3.4*   BILITOT 1.2*   ALKPHOS 113   AST 17   ALT 9*   ANIONGAP 11   EGFRNONAA 58*     POCT Glucose:   Recent Labs  Lab 07/09/17  2233   POCTGLUCOSE 118*     Troponin:   Recent Labs  Lab 07/09/17  1758 07/10/17  0010   TROPONINI 0.044* 0.053*       Significant Imaging: I have reviewed all pertinent imaging results/findings within the past 24 hours.   Imaging Results          X-Ray Chest 1 View (Final result)  Result time 07/09/17 18:38:39    Final result by Nirav Turner MD (07/09/17 18:38:39)                 Impression:       1.  Cardiomegaly with enlargement of the pulmonary arteries and changes of mild pulmonary edema.     2.  No focal consolidations to suggest lobar pneumonia.              Electronically signed by: NIRAV TURNER MD  Date:     07/09/17  Time:    18:38              Narrative:    Exam: [id number]  07/09/17  18:03:00 IMG34 ([facility]) : XR CHEST 1 VIEW    Technique:    Single frontal chest x-ray    Comparison:     None     Findings:      Monitoring EKG leads are present.  The trachea is unremarkable.  There is enlargement of the cardiomediastinal silhouette.  There are calcifications of the aortic knob.  There is enlargement of the pulmonary arteries.  The hemidiaphragms are unremarkable.  There is no evidence of free air beneath the hemidiaphragms.  There are no pleural effusions.  There is no evidence of a pneumothorax.  There is no evidence of pneumomediastinum. No airspace opacities are present.  There is prominence of the pulmonary interstitium. The patient is status post median sternotomy.  There are degenerative changes in the osseous structures.                            Assessment/Plan:     * Acute CHF  (congestive heart failure)    Patient presenting with 1 day of coughing up copious clear-white sputum.  with mild pulmonary congestion on chest x-ray, mild crackles on exam. He is not on diuretics at home.  --Given Lasix 40 mg IV on the ED. Will continue with Lasix 20 IV Daily  --ECHO           Elevated troponin    Troponin on admit 0.044. No baseline available to compare to. Denies CP, but poor historian due to dementia.   No STEMI. Rhythm: Sinus Tachycardia. Heart Rate: 126. Conduction: RBBB. ST Segment Depression: V3 and V4. T Waves Flipped: III and AVF  Trend Troponin.           Hyperlipemia    Continue home pravastatin 80 mg daily. Check Lipids          Advanced dementia    DNR  Patient lives with his son and daughter-n-law who presents with advanced directives states desire to be DNR. He is on Palliative Care at home.  Family was not present at the time of my exam.  Patient is confused and somewhat combative at times.  His mood is labile.  --Continue home Mirtazapine 15 mg every evening, sertraline 25 mg daily  --Case Management Consult for discharge planning            VTE Risk Mitigation         Ordered     Medium Risk of VTE  Once      07/09/17 2133     Place sequential compression device  Until discontinued      07/09/17 2133     Place KRISTI hose  Until discontinued      07/09/17 2133        Gladis Restrepo NP  Department of Hospital Medicine   [facility]

## 2017-07-10 NOTE — NURSING
Pt admitted to room 464 from the ER via stretcher.  Pt speech is impossible to understand.  Pt's daughter in law stayed briefly to answer a few questions. Assessment completed per flowsheet. Pt is combative when trying to assess him.  Ashu moreno and SCDs placed on pt.  Telemetry placed on pt.

## 2017-07-10 NOTE — PROGRESS NOTES
[facility] Hospital Medicine  Progress Note    Patient Name: Raghav Greer  MRN: [id number]  Patient Class: OP- Observation   Admission Date: 7/9/2017  Length of Stay: 0 days  Attending Physician: Daniel Schaefer MD  Primary Care Provider: No primary care provider on file.        Subjective:     Principal Problem:Acute diastolic congestive heart failure    HPI:  Mr. Raghav Greer is 89-year-old male with advanced dementia on palliative care, DNR (Advaced Directives on Chart), Diabetes, hypertension, hyperlipemia, and CHF. He lives with his son and daughter-n-law.  [facility] and his last check up was about 2 months ago.    He presented to [facility] ED with complaints of presents coughing up thick mucus beginning at breakfast this morning. ED workup revealed a troponin of 0.044 and  with mild pulmonary congestion on chest x-ray.  All other labs essentially unremarkable.  He is admitted to [facility], Observation, for Congestive Heart Failure and elevated Troponin. He was given furosemide 40 mg in the emergency room asa 325 mg. I will continue gentle diuresis with Lasix 20 mg IV daily, as he is not currently on diuretics. I will trend his troponin and echo in the morning.       Hospital Course:  2D Echo 7/10/17: EF 55%, + diastolic dysfunction, + pulmonary HTN    Interval History: Pt resting comfortably on room air. No cough. No mucous noted. Discussed with daughter-in-law, Mary Anne Greer ([phone]).      Review of Systems   Unable to perform ROS: Dementia     Objective:     Vital Signs (Most Recent):  Temp: 98 °F (36.7 °C) (07/10/17 1124)  Pulse: 82 (07/10/17 1600)  Resp: 20 (07/10/17 1124)  BP: (!) 143/67 (07/10/17 1124)  SpO2: 97 % (07/10/17 1600) Vital Signs (24h Range):  Temp:  [96.8 °F (36 °C)-98.6 °F (37 °C)] 98 °F (36.7 °C)  Pulse:  [] 82  Resp:  [12-23] 20  SpO2:  [94 %-100 %] 97 %  BP: (100-193)/() 143/67     Weight: 57.3 kg (126 lb 5  oz)  Body mass index is 19.83 kg/m².    Intake/Output Summary (Last 24 hours) at 07/10/17 1622  Last data filed at 07/10/17 0956   Gross per 24 hour   Intake              125 ml   Output              606 ml   Net             -481 ml      Physical Exam   Constitutional: No distress.   Cardiovascular: Normal rate and regular rhythm.    Pulmonary/Chest: Effort normal and breath sounds normal. No respiratory distress. He has no wheezes. He has no rales.   Musculoskeletal: He exhibits edema.   Trace edema in bilateral feet.    Psychiatric:   Sleeping comfortably.    Nursing note and vitals reviewed.      Significant Labs:   A1C:   Recent Labs  Lab 07/10/17  0451   HGBA1C 5.3     BMP:   Recent Labs  Lab 07/10/17  0450   *      K 3.9      CO2 28   BUN 15   CREATININE 1.0   CALCIUM 10.3   MG 2.2     Lipid Panel:   Recent Labs  Lab 07/10/17  0450   CHOL 193   HDL 57   LDLCALC 123.4   TRIG 63   CHOLHDL 29.5     Troponin:   Recent Labs  Lab 07/10/17  0010 07/10/17  0451 07/10/17  1223   TROPONINI 0.053* 0.049* 0.054*     TSH:   Recent Labs  Lab 07/10/17  0450   TSH 1.161       Significant ImaginD Echo 7/10/2017  CONCLUSIONS     1 - Severe left atrial enlargement.     2 - Concentric remodeling.     3 - No wall motion abnormalities.     4 - Normal left ventricular systolic function (EF 55-60%).     5 - Impaired LV relaxation, elevated LAP (grade 2 diastolic dysfunction).     6 - Normal right ventricular systolic function .     7 - Pulmonary hypertension. The estimated PA systolic pressure is 43 mmHg.     8 - Mild tricuspid regurgitation.     Assessment/Plan:      Elevated troponin    Troponin on admit 0.044. Trended 0.053 >> 0.049 >> 0.054.  Daughter-in-law denies that he looked in pain or clutched his chest.  No STEMI. Monitor on telemetry.         Essential hypertension     - 193.  Continue home metoprolol tartrate 50 mg BID. Monitor.            Hyperlipemia    Continue home pravastatin 80 mg  daily. , HDL 57, , TG 63.           Advanced dementia    DNR  Patient lives with his son and daughter-n-law who confirm that patient is DNR. [facility] Patient is confused and somewhat combative at times.  His mood is labile.  --Continue home Mirtazapine 15 mg every evening, sertraline 25 mg daily          * Acute diastolic congestive heart failure    Echo confirms diastolic dysfunction and pulmonary HTN.  Patient presented with 1 day of coughing up copious clear-white sputum.  with mild pulmonary congestion on chest x-ray, mild crackles on exam. He was not on diuretics at home.  Symptoms improved with IV lasix.  Will discharge with PO Lasix 20 mg daily as needed for cough, dyspnea or weight gain.                VTE Risk Mitigation         Ordered     Medium Risk of VTE  Once      07/09/17 2133     Place sequential compression device  Until discontinued      07/09/17 2133     Place KRISTI hose  Until discontinued      07/09/17 2133          Mabel Del Castillo PA-C  Department of Hospital Medicine   [facility]  Pager: [phone]    Attending: Daniel Schaefer MD

## 2017-07-11 VITALS
WEIGHT: 126.31 LBS | BODY MASS INDEX: 19.82 KG/M2 | RESPIRATION RATE: 20 BRPM | DIASTOLIC BLOOD PRESSURE: 72 MMHG | TEMPERATURE: 98 F | HEART RATE: 86 BPM | OXYGEN SATURATION: 95 % | SYSTOLIC BLOOD PRESSURE: 149 MMHG | HEIGHT: 67 IN

## 2017-07-11 NOTE — DISCHARGE INSTRUCTIONS
Instructions given to pt's family.  Understanding verbalized.. All lines and telemetry have been dc'd.

## 2018-03-01 ENCOUNTER — HOSPITAL ENCOUNTER (INPATIENT)
Facility: HOSPITAL | Age: 83
LOS: 3 days | Discharge: HOME HEALTH | DRG: 682 | End: 2018-03-05
Attending: EMERGENCY MEDICINE | Admitting: FAMILY MEDICINE
Payer: MEDICARE

## 2018-03-01 DIAGNOSIS — R53.83 FATIGUE: ICD-10-CM

## 2018-03-01 DIAGNOSIS — R62.7 FAILURE TO THRIVE IN ADULT: ICD-10-CM

## 2018-03-01 DIAGNOSIS — R53.83 FATIGUE, UNSPECIFIED TYPE: ICD-10-CM

## 2018-03-01 DIAGNOSIS — N17.9 ACUTE KIDNEY FAILURE: ICD-10-CM

## 2018-03-01 DIAGNOSIS — F03.C0 ADVANCED DEMENTIA: ICD-10-CM

## 2018-03-01 DIAGNOSIS — R00.1 HEART RATE SLOW: ICD-10-CM

## 2018-03-01 DIAGNOSIS — N17.9 ACUTE RENAL FAILURE, UNSPECIFIED ACUTE RENAL FAILURE TYPE: ICD-10-CM

## 2018-03-01 DIAGNOSIS — R79.89 ELEVATED TROPONIN: Primary | ICD-10-CM

## 2018-03-01 DIAGNOSIS — E78.5 HYPERLIPIDEMIA, UNSPECIFIED HYPERLIPIDEMIA TYPE: ICD-10-CM

## 2018-03-01 DIAGNOSIS — E86.0 DEHYDRATION: ICD-10-CM

## 2018-03-01 DIAGNOSIS — I50.32 CHRONIC DIASTOLIC CONGESTIVE HEART FAILURE: ICD-10-CM

## 2018-03-01 DIAGNOSIS — I10 ESSENTIAL HYPERTENSION: ICD-10-CM

## 2018-03-01 DIAGNOSIS — R79.89 ELEVATED LACTIC ACID LEVEL: ICD-10-CM

## 2018-03-01 DIAGNOSIS — N17.9 AKI (ACUTE KIDNEY INJURY): ICD-10-CM

## 2018-03-01 PROBLEM — R62.51 FAILURE TO THRIVE (0-17): Status: ACTIVE | Noted: 2018-03-01

## 2018-03-01 LAB
ALBUMIN SERPL BCP-MCNC: 3.1 G/DL
ALP SERPL-CCNC: 92 U/L
ALT SERPL W/O P-5'-P-CCNC: 9 U/L
ANION GAP SERPL CALC-SCNC: 11 MMOL/L
AST SERPL-CCNC: 18 U/L
BACTERIA #/AREA URNS HPF: ABNORMAL /HPF
BASOPHILS # BLD AUTO: 0.01 K/UL
BASOPHILS NFR BLD: 0.2 %
BILIRUB SERPL-MCNC: 1.1 MG/DL
BILIRUB UR QL STRIP: NEGATIVE
BNP SERPL-MCNC: 244 PG/ML
BUN SERPL-MCNC: 26 MG/DL
CALCIUM SERPL-MCNC: 10.7 MG/DL
CHLORIDE SERPL-SCNC: 99 MMOL/L
CK SERPL-CCNC: 44 U/L
CLARITY UR: CLEAR
CO2 SERPL-SCNC: 31 MMOL/L
COLOR UR: ABNORMAL
CREAT SERPL-MCNC: 1.5 MG/DL
DIFFERENTIAL METHOD: ABNORMAL
EOSINOPHIL # BLD AUTO: 0 K/UL
EOSINOPHIL NFR BLD: 0.8 %
ERYTHROCYTE [DISTWIDTH] IN BLOOD BY AUTOMATED COUNT: 14 %
EST. GFR  (AFRICAN AMERICAN): 46 ML/MIN/1.73 M^2
EST. GFR  (NON AFRICAN AMERICAN): 40 ML/MIN/1.73 M^2
GLUCOSE SERPL-MCNC: 127 MG/DL
GLUCOSE UR QL STRIP: NEGATIVE
HCT VFR BLD AUTO: 37.4 %
HGB BLD-MCNC: 11.9 G/DL
HGB UR QL STRIP: ABNORMAL
KETONES UR QL STRIP: NEGATIVE
LACTATE SERPL-SCNC: 3.9 MMOL/L
LEUKOCYTE ESTERASE UR QL STRIP: NEGATIVE
LYMPHOCYTES # BLD AUTO: 1.2 K/UL
LYMPHOCYTES NFR BLD: 23.9 %
MAGNESIUM SERPL-MCNC: 2.1 MG/DL
MCH RBC QN AUTO: 31.4 PG
MCHC RBC AUTO-ENTMCNC: 31.8 G/DL
MCV RBC AUTO: 99 FL
MICROSCOPIC COMMENT: ABNORMAL
MONOCYTES # BLD AUTO: 0.5 K/UL
MONOCYTES NFR BLD: 8.9 %
NEUTROPHILS # BLD AUTO: 3.4 K/UL
NEUTROPHILS NFR BLD: 66 %
NITRITE UR QL STRIP: NEGATIVE
PH UR STRIP: 6 [PH] (ref 5–8)
PLATELET # BLD AUTO: 142 K/UL
PMV BLD AUTO: 11.5 FL
POTASSIUM SERPL-SCNC: 3.3 MMOL/L
PROT SERPL-MCNC: 7.2 G/DL
PROT UR QL STRIP: ABNORMAL
RBC # BLD AUTO: 3.79 M/UL
RBC #/AREA URNS HPF: >100 /HPF (ref 0–4)
SODIUM SERPL-SCNC: 141 MMOL/L
SP GR UR STRIP: 1.01 (ref 1–1.03)
SQUAMOUS #/AREA URNS HPF: 2 /HPF
TROPONIN I SERPL DL<=0.01 NG/ML-MCNC: 0.04 NG/ML
TSH SERPL DL<=0.005 MIU/L-ACNC: 2.73 UIU/ML
URN SPEC COLLECT METH UR: ABNORMAL
UROBILINOGEN UR STRIP-ACNC: 1 EU/DL
WBC # BLD AUTO: 5.18 K/UL
WBC #/AREA URNS HPF: 1 /HPF (ref 0–5)

## 2018-03-01 PROCEDURE — 99285 EMERGENCY DEPT VISIT HI MDM: CPT | Mod: 25

## 2018-03-01 PROCEDURE — 25000003 PHARM REV CODE 250: Performed by: EMERGENCY MEDICINE

## 2018-03-01 PROCEDURE — 93005 ELECTROCARDIOGRAM TRACING: CPT

## 2018-03-01 PROCEDURE — G0378 HOSPITAL OBSERVATION PER HR: HCPCS

## 2018-03-01 PROCEDURE — 80053 COMPREHEN METABOLIC PANEL: CPT

## 2018-03-01 PROCEDURE — 96365 THER/PROPH/DIAG IV INF INIT: CPT

## 2018-03-01 PROCEDURE — 84484 ASSAY OF TROPONIN QUANT: CPT | Mod: 91

## 2018-03-01 PROCEDURE — 83036 HEMOGLOBIN GLYCOSYLATED A1C: CPT

## 2018-03-01 PROCEDURE — 63600175 PHARM REV CODE 636 W HCPCS: Performed by: EMERGENCY MEDICINE

## 2018-03-01 PROCEDURE — 83735 ASSAY OF MAGNESIUM: CPT

## 2018-03-01 PROCEDURE — 83880 ASSAY OF NATRIURETIC PEPTIDE: CPT

## 2018-03-01 PROCEDURE — 81000 URINALYSIS NONAUTO W/SCOPE: CPT

## 2018-03-01 PROCEDURE — 93010 ELECTROCARDIOGRAM REPORT: CPT | Mod: ,,, | Performed by: INTERNAL MEDICINE

## 2018-03-01 PROCEDURE — 82550 ASSAY OF CK (CPK): CPT

## 2018-03-01 PROCEDURE — 36415 COLL VENOUS BLD VENIPUNCTURE: CPT

## 2018-03-01 PROCEDURE — 85025 COMPLETE CBC W/AUTO DIFF WBC: CPT

## 2018-03-01 PROCEDURE — 96361 HYDRATE IV INFUSION ADD-ON: CPT

## 2018-03-01 PROCEDURE — 83605 ASSAY OF LACTIC ACID: CPT

## 2018-03-01 PROCEDURE — 84443 ASSAY THYROID STIM HORMONE: CPT

## 2018-03-01 PROCEDURE — 84484 ASSAY OF TROPONIN QUANT: CPT

## 2018-03-01 RX ORDER — SODIUM CHLORIDE 0.9 % (FLUSH) 0.9 %
5 SYRINGE (ML) INJECTION
Status: DISCONTINUED | OUTPATIENT
Start: 2018-03-01 | End: 2018-03-05 | Stop reason: HOSPADM

## 2018-03-01 RX ORDER — SODIUM CHLORIDE 9 MG/ML
500 INJECTION, SOLUTION INTRAVENOUS
Status: COMPLETED | OUTPATIENT
Start: 2018-03-01 | End: 2018-03-01

## 2018-03-01 RX ORDER — OMEPRAZOLE 20 MG/1
20 CAPSULE, DELAYED RELEASE ORAL DAILY
Status: ON HOLD | COMMUNITY
End: 2018-05-10 | Stop reason: HOSPADM

## 2018-03-01 RX ORDER — BRIMONIDINE TARTRATE 2 MG/ML
1 SOLUTION/ DROPS OPHTHALMIC EVERY 8 HOURS
Status: ON HOLD | COMMUNITY
End: 2018-05-10 | Stop reason: HOSPADM

## 2018-03-01 RX ORDER — SIMVASTATIN 80 MG/1
80 TABLET, FILM COATED ORAL NIGHTLY
Status: ON HOLD | COMMUNITY
End: 2018-03-05 | Stop reason: HOSPADM

## 2018-03-01 RX ORDER — ONDANSETRON 2 MG/ML
4 INJECTION INTRAMUSCULAR; INTRAVENOUS EVERY 6 HOURS PRN
Status: DISCONTINUED | OUTPATIENT
Start: 2018-03-01 | End: 2018-03-05 | Stop reason: HOSPADM

## 2018-03-01 RX ORDER — PANTOPRAZOLE SODIUM 40 MG/1
40 TABLET, DELAYED RELEASE ORAL DAILY
Status: DISCONTINUED | OUTPATIENT
Start: 2018-03-02 | End: 2018-03-05 | Stop reason: HOSPADM

## 2018-03-01 RX ORDER — PRAVASTATIN SODIUM 40 MG/1
80 TABLET ORAL DAILY
Status: DISCONTINUED | OUTPATIENT
Start: 2018-03-02 | End: 2018-03-02

## 2018-03-01 RX ORDER — AMLODIPINE BESYLATE 10 MG/1
10 TABLET ORAL DAILY
Status: ON HOLD | COMMUNITY
End: 2018-05-10 | Stop reason: HOSPADM

## 2018-03-01 RX ORDER — POLYVINYL ALCOHOL 14 MG/ML
1 SOLUTION/ DROPS OPHTHALMIC
Status: ON HOLD | COMMUNITY
End: 2018-05-10 | Stop reason: HOSPADM

## 2018-03-01 RX ORDER — SERTRALINE HYDROCHLORIDE 25 MG/1
25 TABLET, FILM COATED ORAL NIGHTLY
Status: DISCONTINUED | OUTPATIENT
Start: 2018-03-01 | End: 2018-03-05 | Stop reason: HOSPADM

## 2018-03-01 RX ORDER — MIRTAZAPINE 15 MG/1
15 TABLET, FILM COATED ORAL NIGHTLY
Status: DISCONTINUED | OUTPATIENT
Start: 2018-03-01 | End: 2018-03-05 | Stop reason: HOSPADM

## 2018-03-01 RX ORDER — IBUPROFEN 200 MG
16 TABLET ORAL
Status: DISCONTINUED | OUTPATIENT
Start: 2018-03-01 | End: 2018-03-05 | Stop reason: HOSPADM

## 2018-03-01 RX ORDER — TERAZOSIN 5 MG/1
5 CAPSULE ORAL NIGHTLY
Status: ON HOLD | COMMUNITY
End: 2018-05-10 | Stop reason: HOSPADM

## 2018-03-01 RX ORDER — FINASTERIDE 5 MG/1
5 TABLET, FILM COATED ORAL DAILY
Status: ON HOLD | COMMUNITY
End: 2018-05-10 | Stop reason: HOSPADM

## 2018-03-01 RX ORDER — IBUPROFEN 200 MG
24 TABLET ORAL
Status: DISCONTINUED | OUTPATIENT
Start: 2018-03-01 | End: 2018-03-05 | Stop reason: HOSPADM

## 2018-03-01 RX ORDER — SODIUM CHLORIDE 9 MG/ML
INJECTION, SOLUTION INTRAVENOUS CONTINUOUS
Status: DISCONTINUED | OUTPATIENT
Start: 2018-03-01 | End: 2018-03-02

## 2018-03-01 RX ORDER — GLUCAGON 1 MG
1 KIT INJECTION
Status: DISCONTINUED | OUTPATIENT
Start: 2018-03-01 | End: 2018-03-05 | Stop reason: HOSPADM

## 2018-03-01 RX ADMIN — SODIUM CHLORIDE 500 ML: 0.9 INJECTION, SOLUTION INTRAVENOUS at 09:03

## 2018-03-01 RX ADMIN — PIPERACILLIN AND TAZOBACTAM 4.5 G: 4; .5 INJECTION, POWDER, LYOPHILIZED, FOR SOLUTION INTRAVENOUS; PARENTERAL at 06:03

## 2018-03-01 RX ADMIN — SODIUM CHLORIDE 500 ML: 0.9 INJECTION, SOLUTION INTRAVENOUS at 05:03

## 2018-03-01 NOTE — ED NOTES
Family reports ongoing issues with pt not eating well or drinking enough fluids. Pt was seen by HH nurse today and she suggested that pt be evaluated in ED for continued weight loss. Pt tachycardic on arrival. Family at bedside reports pt has been more weak than normal. Pt denies CP, SOB, and N/V/D. AAO.    APPEARANCE: Awake, alert, & oriented. No acute distress.  CARDIAC: tachycardic. Denies chest pain.     RESPIRATORY:Normal rate and effort. Respirations are even and unlabored no obvious signs of distress.  PERIPHERAL VASCULAR: peripheral pulses present. Normal cap refill. No edema.   GASTRO: soft, no tenderness, no abdominal distention.  MUSC: Full ROM. No bony tenderness or soft tissue tenderness. No obvious deformity.  SKIN: Skin is warm, dry, and intact. Normal skin turgor and color.  NEURO: [employer] coma scale: eyes open spontaneously-4, obeys commands-6, oriented-5. Total=15. Clear speech. No neurological abnormalities.   EENT: No c/o vision or hearing difficulties.

## 2018-03-01 NOTE — ED PROVIDER NOTES
Encounter Date: 3/1/2018    SCRIBE #1 NOTE: I, Minerva Bell, am scribing for, and in the presence of,  Dr. Reina. I have scribed the entire note.       History     Chief Complaint   Patient presents with    Failure To Thrive     failure to thrive for about 6 months.  Home Health Nurse sent him to ED for evaluation.  Lower abdominal pressure     Time patient was seen by the provider: 4:31 PM      The patient is a 89 y.o. male with co-morbidities including CHF, HTN, DM who was sent to the ED by his home nurse to be evaluated for FTT. His family states that he has unintentionally lost about 20 lbs over the past year. The patient's family also report that the patient has decreased appetite, is dehydrated, and has lower abdominal pain.  They deny any fever, N/V/D, chills, CP, dysuria, hematuria, or SOB. No other medical issues or exacerbating/relieving factors reported.           The history is provided by the patient and a relative.     Review of patient's allergies indicates:  No Known Allergies  Past Medical History:   Diagnosis Date    CHF (congestive heart failure)     Diabetes mellitus     Hypertension      History reviewed. No pertinent surgical history.  History reviewed. No pertinent family history.  Social History   Substance Use Topics    Smoking status: Unknown If Ever Smoked    Smokeless tobacco: Not on file      Comment: Pt unable to answer questions.    Alcohol use No      Comment: Pt unable to answer questions.     Review of Systems   Constitutional: Positive for appetite change and fatigue. Negative for chills and fever.        Dehydration   HENT: Negative for congestion, rhinorrhea and sore throat.    Eyes: Negative for pain and visual disturbance.   Respiratory: Negative for cough and shortness of breath.    Cardiovascular: Negative for chest pain.   Gastrointestinal: Positive for abdominal pain. Negative for diarrhea, nausea and vomiting.   Genitourinary: Negative for dysuria and  hematuria.   Musculoskeletal: Negative for back pain and neck pain.   Skin: Negative for rash.   Neurological: Negative for weakness and headaches.   Hematological: Does not bruise/bleed easily.       Physical Exam     Initial Vitals [03/01/18 1534]   BP Pulse Resp Temp SpO2   (!) 145/76 (!) 119 16 98.3 °F (36.8 °C) 99 %      MAP       99         Physical Exam    Nursing note and vitals reviewed.  Constitutional: He appears well-developed and well-nourished. He is not diaphoretic. No distress.   HENT:   Head: Normocephalic and atraumatic.   Right Ear: External ear normal.   Left Ear: External ear normal.   Nose: Nose normal.   Mouth/Throat: Oropharynx is clear and moist. No oropharyngeal exudate.   Eyes: Conjunctivae and EOM are normal. Pupils are equal, round, and reactive to light. Right eye exhibits no discharge. Left eye exhibits no discharge. No scleral icterus.   Neck: Normal range of motion. Neck supple. No JVD present.   Cardiovascular: Regular rhythm, normal heart sounds and intact distal pulses. Tachycardia present.  Exam reveals no gallop and no friction rub.    No murmur heard.  Pulmonary/Chest: Breath sounds normal. No respiratory distress. He has no wheezes. He has no rhonchi. He has no rales.   Abdominal: Soft. Bowel sounds are normal. He exhibits no distension and no mass. There is tenderness. There is no rebound and no guarding.   Suprapubic tenderness   Musculoskeletal: Normal range of motion. He exhibits no edema.   Neurological: He is alert and oriented to person, place, and time. No cranial nerve deficit.   Skin: Skin is warm and dry. Capillary refill takes less than 2 seconds.   Psychiatric: He has a normal mood and affect.         ED Course   Procedures  Labs Reviewed   CBC W/ AUTO DIFFERENTIAL - Abnormal; Notable for the following:        Result Value    RBC 3.79 (*)     Hemoglobin 11.9 (*)     Hematocrit 37.4 (*)     MCV 99 (*)     MCH 31.4 (*)     MCHC 31.8 (*)     Platelets 142 (*)     All  other components within normal limits   COMPREHENSIVE METABOLIC PANEL - Abnormal; Notable for the following:     Potassium 3.3 (*)     CO2 31 (*)     Glucose 127 (*)     Creatinine 1.5 (*)     Calcium 10.7 (*)     Albumin 3.1 (*)     Total Bilirubin 1.1 (*)     ALT 9 (*)     eGFR if  46 (*)     eGFR if non  40 (*)     All other components within normal limits   LACTIC ACID, PLASMA - Abnormal; Notable for the following:     Lactate (Lactic Acid) 3.9 (*)     All other components within normal limits    Narrative:        LA- critical result(s) repeated, called and verbal readback   obtained from NITZA Aldridge RN, 03/01/2018 17:55   TROPONIN I - Abnormal; Notable for the following:     Troponin I 0.043 (*)     All other components within normal limits   URINALYSIS - Abnormal; Notable for the following:     Protein, UA Trace (*)     Occult Blood UA 3+ (*)     All other components within normal limits   B-TYPE NATRIURETIC PEPTIDE - Abnormal; Notable for the following:      (*)     All other components within normal limits   URINALYSIS MICROSCOPIC - Abnormal; Notable for the following:     RBC, UA >100 (*)     Bacteria, UA Few (*)     All other components within normal limits   TSH   MAGNESIUM   B-TYPE NATRIURETIC PEPTIDE   CK   CK     EKG Readings: (Independently Interpreted)   Initial Reading: No STEMI. Previous EKG: Compared with most recent EKG Previous EKG Date: 7/9/17. Rhythm: Sinus Tachycardia. Heart Rate: 116. Ectopy: PVCs.       X-Rays:   Independently Interpreted Readings:   Other Readings:  I have visualized all imaging for this patient, radiology has done the interpretation.      Imaging Results          CT Renal Stone Study ABD Pelvis WO (Final result)  Result time 03/01/18 19:44:53    Final result by Brad Aguilera MD (03/01/18 19:44:53)                 Impression:      1.  Moderate volume retained stool within the colon with large volume stool seen  within the rectum which may be seen in setting of constipation with fecal impaction.  No significant rectal wall thickening or surrounding inflammatory changes.  No evidence of small bowel obstruction.    2.  Right middle lobe 1.1 cm nodular opacity.  No prior studies are available for comparison.  Potential neoplastic process not excluded.  Given patient's advanced age, clinical considerations should determine need for future surveillance.    3.  Large hiatal hernia.    4.  Additional findings as detailed above.        Electronically signed by: BENIGNO MCCABE MD  Date:     03/01/18  Time:    19:44              Narrative:    Clinical indication: 89 year-old male with abdominal pain.    Comparison: None.    Technique: Transaxial images were obtained through the abdomen and pelvis in 5 mm increments without the use of p.o. or IV contrast.    Findings:   Postsurgical changes and significant coronary artery calcification is seen.  Mild chronic appearing changes are seen at the lung bases.  There is nodular opacity measuring 1.1 cm within the right middle lobe.    Evaluation of the abdomen and pelvis is limited by lack of intravenous and oral contrast as well as patient's arms at his side during the time of image acquisition.    No focal hepatic abnormalities identified on this noncontrast exam.  There is no intra-or extrahepatic biliary ductal dilatation.  The gallbladder is unremarkable.  Large hiatal hernia is visualized.  Spleen and adrenal glands are unremarkable.  Atrophic changes are seen of the pancreas.    Bilateral renal cysts are visualized.  Small nonobstructing stones versus vascular calcifications are seen in the kidneys.  No stones are seen along the ureteral courses.  Urinary bladder is unremarkable.  Prostate is mildly enlarged.      Large volume of retained stool is visualized within the rectum which may be seen in setting of fecal impaction.  No significant rectal wall thickening or surrounding  "inflammatory changes are appreciated.  Additional moderate volume retained stool is seen throughout the colon.  No evidence of bowel obstruction.  No free air or free fluid.    Extensive atherosclerosis is seen of the aorta.  Aorta is tortuous with ectasia of the infrarenal abdominal aorta measuring 2.7 cm.  Severe atherosclerosis is seen throughout the bilateral iliacs.     Extensive multilevel degenerative changes are seen within the spine.  Subcutaneous soft tissue structures are unremarkable.                             X-Ray Chest PA And Lateral (Final result)  Result time 03/01/18 17:19:45    Final result by Benigno Mccabe MD (03/01/18 17:19:45)                 Impression:      Chronic lung changes with possible mild interstitial edema.      Electronically signed by: BENIGNO MCCABE MD  Date:     03/01/18  Time:    17:19              Narrative:    Chest PA and lateral.  Comparison: 7/2017.    Postsurgical changes are seen.  Cardiac silhouette is stable in size. Lungs are symmetrically expanded.  There is increased bilateral interstitial attenuation which may represent chronic changes versus mild interstitial edema.  No evidence of focal consolidative process, pneumothorax, or significant effusion.  Extensive degenerative changes are seen within the mid to lower thoracic spine.  No acute osseous abnormality identified.                              Medical Decision Making:   Initial Assessment:   89 y/o male sent by home health nurse for "failure to thrive".  C/o suprapubic abdominal pain  Differential Diagnosis:   Failure to thrive, dehydration, electrolyte abnormality, JEREMY, sepsis, UTI  Independently Interpreted Test(s):   I have ordered and independently interpreted EKG Reading(s) - see prior notes  Clinical Tests:   Lab Tests: Ordered and Reviewed  Radiological Study: Ordered and Reviewed  Medical Tests: Ordered and Reviewed  Patient handed off to Dr. Bajwa @ 6 PM for follow up labs and CT abd/pelvis        "            ED Course as of Mar 04 0016   u Mar 01, 2018   1734 Giving IVFs Pulse: (!) 119 [LD]      ED Course User Index  [LD] Maddi Reina MD     Clinical Impression:     1. Elevated troponin    2. Fatigue    3. Elevated lactic acid level    4. Failure to thrive in adult    5. Acute kidney failure    6. JEREMY (acute kidney injury)    7. Acute renal failure, unspecified acute renal failure type    8. Advanced dementia    9. Chronic diastolic congestive heart failure    10. Dehydration    11. Essential hypertension    12. Fatigue, unspecified type    13. Hyperlipidemia, unspecified hyperlipidemia type    14. Heart rate slow                I, Maddi Reina,  personally performed the services described in this documentation. All medical record entries made by the scribe were at my direction and in my presence.  I have reviewed the chart and agree that the record reflects my personal performance and is accurate and complete. Maddi Reina M.D. 5:45 PM03/04/2018                 Maddi Reina MD  03/04/18 0019

## 2018-03-02 PROBLEM — R62.7 FAILURE TO THRIVE IN ADULT: Status: ACTIVE | Noted: 2018-03-01

## 2018-03-02 PROBLEM — E86.0 DEHYDRATION: Status: ACTIVE | Noted: 2018-03-02

## 2018-03-02 PROBLEM — N17.9 ACUTE KIDNEY FAILURE: Status: ACTIVE | Noted: 2018-03-02

## 2018-03-02 PROBLEM — I50.32 CHRONIC DIASTOLIC CONGESTIVE HEART FAILURE: Status: ACTIVE | Noted: 2017-07-09

## 2018-03-02 PROBLEM — N17.9 AKI (ACUTE KIDNEY INJURY): Status: ACTIVE | Noted: 2018-03-02

## 2018-03-02 LAB
ALBUMIN SERPL BCP-MCNC: 3.8 G/DL
ALP SERPL-CCNC: 55 U/L
ALT SERPL W/O P-5'-P-CCNC: 32 U/L
ANION GAP SERPL CALC-SCNC: 6 MMOL/L
AST SERPL-CCNC: 30 U/L
BASOPHILS # BLD AUTO: 0.01 K/UL
BASOPHILS NFR BLD: 0.3 %
BILIRUB SERPL-MCNC: 0.7 MG/DL
BUN SERPL-MCNC: 14 MG/DL
CALCIUM SERPL-MCNC: 9.4 MG/DL
CHLORIDE SERPL-SCNC: 101 MMOL/L
CO2 SERPL-SCNC: 34 MMOL/L
CREAT SERPL-MCNC: 0.9 MG/DL
DIFFERENTIAL METHOD: ABNORMAL
EOSINOPHIL # BLD AUTO: 0.1 K/UL
EOSINOPHIL NFR BLD: 2.2 %
ERYTHROCYTE [DISTWIDTH] IN BLOOD BY AUTOMATED COUNT: 14 %
EST. GFR  (AFRICAN AMERICAN): >60 ML/MIN/1.73 M^2
EST. GFR  (NON AFRICAN AMERICAN): >60 ML/MIN/1.73 M^2
ESTIMATED AVG GLUCOSE: 91 MG/DL
GLUCOSE SERPL-MCNC: 88 MG/DL
HBA1C MFR BLD HPLC: 4.8 %
HCT VFR BLD AUTO: 36.9 %
HGB BLD-MCNC: 11.9 G/DL
LACTATE SERPL-SCNC: 2.8 MMOL/L
LACTATE SERPL-SCNC: 3 MMOL/L
LACTATE SERPL-SCNC: 3 MMOL/L
LACTATE SERPL-SCNC: 3.8 MMOL/L
LYMPHOCYTES # BLD AUTO: 1.2 K/UL
LYMPHOCYTES NFR BLD: 31.3 %
MAGNESIUM SERPL-MCNC: 2 MG/DL
MCH RBC QN AUTO: 31.6 PG
MCHC RBC AUTO-ENTMCNC: 32.2 G/DL
MCV RBC AUTO: 98 FL
MONOCYTES # BLD AUTO: 0.4 K/UL
MONOCYTES NFR BLD: 9.5 %
NEUTROPHILS # BLD AUTO: 2.1 K/UL
NEUTROPHILS NFR BLD: 56.4 %
PHOSPHATE SERPL-MCNC: 3.2 MG/DL
PLATELET # BLD AUTO: 138 K/UL
PMV BLD AUTO: 11.1 FL
POCT GLUCOSE: 101 MG/DL (ref 70–110)
POCT GLUCOSE: 111 MG/DL (ref 70–110)
POCT GLUCOSE: 136 MG/DL (ref 70–110)
POCT GLUCOSE: 96 MG/DL (ref 70–110)
POTASSIUM SERPL-SCNC: 4 MMOL/L
PROT SERPL-MCNC: 7.3 G/DL
RBC # BLD AUTO: 3.76 M/UL
SODIUM SERPL-SCNC: 141 MMOL/L
TROPONIN I SERPL DL<=0.01 NG/ML-MCNC: 0.04 NG/ML
TROPONIN I SERPL DL<=0.01 NG/ML-MCNC: 0.05 NG/ML
WBC # BLD AUTO: 3.67 K/UL

## 2018-03-02 PROCEDURE — 99900037 HC PT THERAPY SCREENING (STAT)

## 2018-03-02 PROCEDURE — 11000001 HC ACUTE MED/SURG PRIVATE ROOM

## 2018-03-02 PROCEDURE — 84484 ASSAY OF TROPONIN QUANT: CPT

## 2018-03-02 PROCEDURE — 25000003 PHARM REV CODE 250

## 2018-03-02 PROCEDURE — 25000003 PHARM REV CODE 250: Performed by: STUDENT IN AN ORGANIZED HEALTH CARE EDUCATION/TRAINING PROGRAM

## 2018-03-02 PROCEDURE — 83605 ASSAY OF LACTIC ACID: CPT

## 2018-03-02 PROCEDURE — 36415 COLL VENOUS BLD VENIPUNCTURE: CPT

## 2018-03-02 PROCEDURE — 83735 ASSAY OF MAGNESIUM: CPT

## 2018-03-02 PROCEDURE — 80053 COMPREHEN METABOLIC PANEL: CPT

## 2018-03-02 PROCEDURE — 85025 COMPLETE CBC W/AUTO DIFF WBC: CPT

## 2018-03-02 PROCEDURE — 94761 N-INVAS EAR/PLS OXIMETRY MLT: CPT

## 2018-03-02 PROCEDURE — 84100 ASSAY OF PHOSPHORUS: CPT

## 2018-03-02 RX ORDER — METOPROLOL TARTRATE 50 MG/1
50 TABLET ORAL 2 TIMES DAILY
Status: DISCONTINUED | OUTPATIENT
Start: 2018-03-02 | End: 2018-03-04

## 2018-03-02 RX ORDER — AMLODIPINE BESYLATE 5 MG/1
10 TABLET ORAL DAILY
Status: DISCONTINUED | OUTPATIENT
Start: 2018-03-02 | End: 2018-03-05 | Stop reason: HOSPADM

## 2018-03-02 RX ORDER — SODIUM CHLORIDE, SODIUM LACTATE, POTASSIUM CHLORIDE, CALCIUM CHLORIDE 600; 310; 30; 20 MG/100ML; MG/100ML; MG/100ML; MG/100ML
INJECTION, SOLUTION INTRAVENOUS CONTINUOUS
Status: DISCONTINUED | OUTPATIENT
Start: 2018-03-02 | End: 2018-03-04

## 2018-03-02 RX ORDER — ADHESIVE BANDAGE
30 BANDAGE TOPICAL DAILY PRN
Status: DISCONTINUED | OUTPATIENT
Start: 2018-03-02 | End: 2018-03-05 | Stop reason: HOSPADM

## 2018-03-02 RX ADMIN — METOPROLOL TARTRATE 50 MG: 50 TABLET ORAL at 04:03

## 2018-03-02 RX ADMIN — MIRTAZAPINE 15 MG: 15 TABLET, FILM COATED ORAL at 09:03

## 2018-03-02 RX ADMIN — SODIUM CHLORIDE, SODIUM LACTATE, POTASSIUM CHLORIDE, AND CALCIUM CHLORIDE 500 ML: .6; .31; .03; .02 INJECTION, SOLUTION INTRAVENOUS at 06:03

## 2018-03-02 RX ADMIN — PANTOPRAZOLE SODIUM 40 MG: 40 TABLET, DELAYED RELEASE ORAL at 09:03

## 2018-03-02 RX ADMIN — SERTRALINE HYDROCHLORIDE 25 MG: 25 TABLET ORAL at 09:03

## 2018-03-02 RX ADMIN — PRAVASTATIN SODIUM 80 MG: 40 TABLET ORAL at 09:03

## 2018-03-02 RX ADMIN — SERTRALINE HYDROCHLORIDE 25 MG: 25 TABLET ORAL at 01:03

## 2018-03-02 RX ADMIN — MIRTAZAPINE 15 MG: 15 TABLET, FILM COATED ORAL at 01:03

## 2018-03-02 RX ADMIN — SODIUM CHLORIDE, SODIUM LACTATE, POTASSIUM CHLORIDE, AND CALCIUM CHLORIDE: .6; .31; .03; .02 INJECTION, SOLUTION INTRAVENOUS at 06:03

## 2018-03-02 RX ADMIN — SODIUM PHOSPHATE, DIBASIC AND SODIUM PHOSPHATE, MONOBASIC 1 ENEMA: 7; 19 ENEMA RECTAL at 06:03

## 2018-03-02 RX ADMIN — AMLODIPINE BESYLATE 10 MG: 5 TABLET ORAL at 04:03

## 2018-03-02 RX ADMIN — MAGNESIUM HYDROXIDE 2400 MG: 400 SUSPENSION ORAL at 06:03

## 2018-03-02 RX ADMIN — SODIUM CHLORIDE: 0.9 INJECTION, SOLUTION INTRAVENOUS at 01:03

## 2018-03-02 NOTE — ED NOTES
Dr. Bajwa at bedside for rectal exam. Pt. has small amount of loose stool in brief. Perineal care provided. Pt. cleaned and changed.

## 2018-03-02 NOTE — ASSESSMENT & PLAN NOTE
Echo 2017 with normal systolic function (EF 55-60%)  Fluids given in 500 cc boluses given history of CHF  Started on 75 cc/hr maintenance fluids to slowly rehydrate.

## 2018-03-02 NOTE — HPI
89 y.o. male with PMHx of CHF with preserved EF, HTN, and DM who was sent to the ED by his home nurse for evaluation for failure to thrive. His family states that he has unintentionally lost about 20 lbs over the past year, and has been experiencing decreased appetite, decreased PO intake, and dehydration. Deny any fever, N/V/D, chills, chests pain, dysuria, hematuria, or SOB. Patient also complaining of lower abdominal pain that family believes is due to lack of BM for several days.

## 2018-03-02 NOTE — PROGRESS NOTES
"PGY-2 Progress Note  [facility]  Follow up for:   Chief Complaint   Patient presents with    Failure To Thrive     failure to thrive for about 6 months.  Home Health Nurse sent him to ED for evaluation.  Lower abdominal pressure       Hospital Stay Day 0      Subjective: NAEON. Patient seen and examined. Patient states that he is not very hungry and hasn't been in awhile. He reports constipation.  He has no other complaints, denies cp, sob, cough, n/v, f/c, dysuria.    Scheduled Meds:   amLODIPine  10 mg Oral Daily    lactated ringers  500 mL Intravenous Once    metoprolol tartrate  50 mg Oral BID    mirtazapine  15 mg Oral QHS    pantoprazole  40 mg Oral Daily    sertraline  25 mg Oral QHS    sodium phosphates  1 enema Rectal Once     Continuous Infusions:   lactated ringers       PRN Meds:dextrose 50%, dextrose 50%, glucagon (human recombinant), glucose, glucose, magnesium hydroxide 400 mg/5 ml, ondansetron, sodium chloride 0.9%    Review of patient's allergies indicates:  No Known Allergies    Objectives:     Vitals(Most Recent)      BP  Min: 116/63  Max: 180/94  Temp  Av.6 °F (36.4 °C)  Min: 96 °F (35.6 °C)  Max: 98.7 °F (37.1 °C)  Pulse  Av.2  Min: 74  Max: 119  Resp  Avg: 15.7  Min: 12  Max: 18  SpO2  Av %  Min: 98 %  Max: 100 %  Height  Av' 6" (167.6 cm)  Min: 5' 5" (165.1 cm)  Max: 5' 7" (170.2 cm)  Weight  Av.9 kg (120 lb 14.8 oz)  Min: 53 kg (116 lb 13.5 oz)  Max: 56.7 kg (125 lb)             Vitals([id number])  Temp:  [96 °F (35.6 °C)-98.7 °F (37.1 °C)]   Pulse:  []   Resp:  [12-18]   BP: (116-180)/(60-94)   SpO2:  [98 %-100 %]     I & O([id number])    Intake/Output Summary (Last 24 hours) at 18 1415  Last data filed at 18 1314   Gross per 24 hour   Intake          1768.33 ml   Output              606 ml   Net          1162.33 ml       Physical Exam:   General: awake and oriented to person, place and time, nad  HEENT: temporal wasting, atraoumatic, perrla   CV: " RRR.No M/R/G.   Chest: NL effort. CTAB. No R/R/W.   Abd: +BS x 4. Soft. ND, mildly tender   Ext: moving well.   Skin: Intact. No rash. No lesions.   Neuro: No focal deficit.    LABS  CBC    Recent Labs  Lab 03/01/18  1655 03/02/18  0627   WBC 5.18 3.67*   RBC 3.79* 3.76*   HGB 11.9* 11.9*   HCT 37.4* 36.9*   * 138*   MCV 99* 98   MCH 31.4* 31.6*   MCHC 31.8* 32.2     CMP    Recent Labs  Lab 03/01/18  1655 03/02/18  0627    141   K 3.3* 4.0   CO2 31* 34*   CL 99 101   BUN 26 14   CREATININE 1.5* 0.9   * 88       Recent Labs  Lab 03/01/18  1655 03/02/18  0627   CALCIUM 10.7* 9.4   MG 2.1 2.0   PHOS  --  3.2       Recent Labs  Lab 03/01/18  1655 03/02/18  0627   PROT 7.2 7.3   ALBUMIN 3.1* 3.8   BILITOT 1.1* 0.7   AST 18 30   ALKPHOS 92 55   ALT 9* 32     CE    Recent Labs  Lab 03/01/18  1655 03/01/18  2350 03/02/18  0627   TROPONINI 0.043* 0.054* 0.038*     BNP    Recent Labs  Lab 03/01/18  1655   *       LAST HbA1c  Lab Results   Component Value Date    HGBA1C 4.8 03/01/2018       Imaging  CT renal stone: 1.  Moderate volume retained stool within the colon with large volume stool seen within the rectum which may be seen in setting of constipation with fecal impaction.  No significant rectal wall thickening or surrounding inflammatory changes.  No evidence of small bowel obstruction.    2.  Right middle lobe 1.1 cm nodular opacity.  No prior studies are available for comparison.  Potential neoplastic process not excluded.  Given patient's advanced age, clinical considerations should determine need for future surveillance.    3.  Large hiatal hernia.    Micro:  - bx pending    Assessment/Plan: 89 y.o. male with PMHx of CHF with preserved EF, HTN, and DM sent to ED from home for significant weight loss and FTT and found to have JEREMY.  With CT renal findings of constipation and 1.1 pulm nodule.     FTT  - cardiac monitor  - encourage increased diet and boost   - daily wts  - pt/ot  - discuss  goals of care with family  - elevated lactate, no signs of infectious process at this time, will trend q4 hours.    Pulmonary nodule  - 1.1 cm nodule seen on CT suggestive of neoplasm  - pulm consulted, recs appreciated.       HTN  - bp stable  - cw home amlopine and lopressor    JEREMY  - BUN/Cr of 26/1.5 on admission  - baseline Cr of 1.0   - Likely 2/2 dehydration  - This AM: 14/ 0.9    Constipation  - CT renal study showed stool in colon  - enema ordered  - will continue stool softeners    HFpEF  - 7/10/17; echo EF 55 dd  - will gently hydrate and monitor for volume overload    Code: full  Diet: regular diet plus boost   Ppx: scd, pantoprozole  Dispo: Follow up goals of care, monitor stool output, follow up LA    Case d/w staff.     Veronica Smith D.O.  [facility] HO-2  03/02/2018

## 2018-03-02 NOTE — ED NOTES
Dr. Bajwa verbally ordered in and out catheter for urine specimen. Family members at bedside notified and agree to treatment plan.

## 2018-03-02 NOTE — ASSESSMENT & PLAN NOTE
Family concerned about unintentional weight loss and decreased appetite over the last year  Dehydrated on exam  Fluid resuscitated in ED  Started on cardiac diet

## 2018-03-02 NOTE — SUBJECTIVE & OBJECTIVE
Past Medical History:   Diagnosis Date    CHF (congestive heart failure)     Diabetes mellitus     Hypertension        History reviewed. No pertinent surgical history.    Review of patient's allergies indicates:  No Known Allergies    No current facility-administered medications on file prior to encounter.      Current Outpatient Prescriptions on File Prior to Encounter   Medication Sig    metoprolol tartrate (LOPRESSOR) 50 MG tablet Take 50 mg by mouth 2 (two) times daily.    acetaminophen (TYLENOL) 325 MG tablet Take 325 mg by mouth every 6 (six) hours as needed for Pain.    docusate sodium (COLACE) 100 MG capsule Take 100 mg by mouth once daily at 6am.    furosemide (LASIX) 40 MG tablet Take 1 tablet (40 mg total) by mouth daily as needed (cough with copious white mucous).    hydrocodone-acetaminophen 5-325mg (NORCO) 5-325 mg per tablet Take 1 tablet by mouth every 8 (eight) hours as needed for Pain.    mirtazapine (REMERON) 15 MG tablet Take 15 mg by mouth every evening.    pravastatin (PRAVACHOL) 80 MG tablet Take 80 mg by mouth once daily.    ranitidine (ZANTAC) 150 MG tablet Take 150 mg by mouth 2 (two) times daily.    sertraline (ZOLOFT) 50 MG tablet Take 25 mg by mouth every evening.      Family History     None        Social History Main Topics    Smoking status: Unknown If Ever Smoked    Smokeless tobacco: Not on file      Comment: Pt unable to answer questions.    Alcohol use No      Comment: Pt unable to answer questions.    Drug use: Unknown      Comment: Pt unable to answer questions.    Sexual activity: Not on file      Comment: Pt unable to answer questions.     Review of Systems   Constitutional: Positive for activity change and appetite change. Negative for chills, fatigue and fever.   HENT: Negative for congestion and sinus pressure.    Eyes: Negative for visual disturbance.   Respiratory: Negative for chest tightness and shortness of breath.    Cardiovascular: Negative for chest  pain.   Gastrointestinal: Positive for abdominal distention and abdominal pain. Negative for blood in stool, diarrhea, nausea and vomiting.   Endocrine: Negative for polyuria.   Genitourinary: Negative for frequency.   Musculoskeletal: Negative for arthralgias and myalgias.   Skin: Negative for color change.   Neurological: Negative for dizziness, weakness and light-headedness.   Psychiatric/Behavioral: Negative for agitation and behavioral problems.     Objective:     Vital Signs (Most Recent):  Temp: 97.5 °F (36.4 °C) (03/01/18 2336)  Pulse: 76 (03/02/18 0126)  Resp: 15 (03/01/18 2336)  BP: (!) 156/76 (03/01/18 2336)  SpO2: 100 % (03/01/18 2336) Vital Signs (24h Range):  Temp:  [97.5 °F (36.4 °C)-98.7 °F (37.1 °C)] 97.5 °F (36.4 °C)  Pulse:  [] 76  Resp:  [15-18] 15  SpO2:  [98 %-100 %] 100 %  BP: (116-156)/(60-76) 156/76     Weight: 53 kg (116 lb 13.5 oz)  Body mass index is 18.3 kg/m².    Physical Exam   Constitutional: He is oriented to person, place, and time.   Very thin male, cachectic appearing   HENT:   Head: Normocephalic and atraumatic.   Mouth/Throat: Mucous membranes are dry.   Neck: Normal range of motion. Neck supple.   Cardiovascular: Normal rate and regular rhythm.    Pulmonary/Chest: Effort normal and breath sounds normal. He has no rales.   Abdominal: Soft. Bowel sounds are normal. He exhibits no distension. There is tenderness (suprapubic). There is no guarding.   Musculoskeletal: He exhibits no edema or tenderness.   Neurological: He is alert and oriented to person, place, and time.   Skin: Skin is warm and dry.   Psychiatric: He has a normal mood and affect. His behavior is normal.        Recent Labs      03/01/18   1655   WBC  5.18   HGB  11.9*   HCT  37.4*   PLT  142*   MCV  99*   RDW  14.0       Recent Labs      03/01/18   1655   NA  141   K  3.3*   CL  99   CO2  31*   GLU  127*   BUN  26   CREATININE  1.5*   CALCIUM  10.7*   PROT  7.2   ALBUMIN  3.1*   BILITOT  1.1*   ALKPHOS  92    AST  18   ALT  9*   ANIONGAP  11   ESTGFRAFRICA  46*   EGFRNONAA  40*       Recent Labs      03/01/18   1655   MG  2.1       A1c:   Lab Results   Component Value Date    HGBA1C 4.8 03/01/2018   , Last Gluc: No results for input(s): POCTGLUCOSE in the last 168 hours.    TSH:   Lab Results   Component Value Date    TSH 2.734 03/01/2018       Cardiac Enzymes  Recent Labs      03/01/18   1655  03/01/18   2350   TROPONINI  0.043*  0.054*   CPK  44   --        Coags  No results for input(s): PT, INR, APTT in the last 168 hours.    UA    Recent Labs  Lab 03/01/18  1946   COLORU Straw   SPECGRAV 1.015   PHUR 6.0   PROTEINUA Trace*   BACTERIA Few*       Micro  Microbiology Results (last 7 days)     ** No results found for the last 168 hours. **           ABG  No results for input(s): PH, PCO2, PO2, HCO3, POCSATURATED, BE in the last 168 hours.    Imaging Results          CT Renal Stone Study ABD Pelvis WO (Final result)  Result time 03/01/18 19:44:53    Final result by Benigno Mccabe MD (03/01/18 19:44:53)                 Impression:      1.  Moderate volume retained stool within the colon with large volume stool seen within the rectum which may be seen in setting of constipation with fecal impaction.  No significant rectal wall thickening or surrounding inflammatory changes.  No evidence of small bowel obstruction.    2.  Right middle lobe 1.1 cm nodular opacity.  No prior studies are available for comparison.  Potential neoplastic process not excluded.  Given patient's advanced age, clinical considerations should determine need for future surveillance.    3.  Large hiatal hernia.    4.  Additional findings as detailed above.        Electronically signed by: BENIGNO MCCABE MD  Date:     03/01/18  Time:    19:44              Narrative:    Clinical indication: 89 year-old male with abdominal pain.    Comparison: None.    Technique: Transaxial images were obtained through the abdomen and pelvis in 5 mm increments without  the use of p.o. or IV contrast.    Findings:   Postsurgical changes and significant coronary artery calcification is seen.  Mild chronic appearing changes are seen at the lung bases.  There is nodular opacity measuring 1.1 cm within the right middle lobe.    Evaluation of the abdomen and pelvis is limited by lack of intravenous and oral contrast as well as patient's arms at his side during the time of image acquisition.    No focal hepatic abnormalities identified on this noncontrast exam.  There is no intra-or extrahepatic biliary ductal dilatation.  The gallbladder is unremarkable.  Large hiatal hernia is visualized.  Spleen and adrenal glands are unremarkable.  Atrophic changes are seen of the pancreas.    Bilateral renal cysts are visualized.  Small nonobstructing stones versus vascular calcifications are seen in the kidneys.  No stones are seen along the ureteral courses.  Urinary bladder is unremarkable.  Prostate is mildly enlarged.      Large volume of retained stool is visualized within the rectum which may be seen in setting of fecal impaction.  No significant rectal wall thickening or surrounding inflammatory changes are appreciated.  Additional moderate volume retained stool is seen throughout the colon.  No evidence of bowel obstruction.  No free air or free fluid.    Extensive atherosclerosis is seen of the aorta.  Aorta is tortuous with ectasia of the infrarenal abdominal aorta measuring 2.7 cm.  Severe atherosclerosis is seen throughout the bilateral iliacs.     Extensive multilevel degenerative changes are seen within the spine.  Subcutaneous soft tissue structures are unremarkable.                             X-Ray Chest PA And Lateral (Final result)  Result time 03/01/18 17:19:45    Final result by Benigno Mccabe MD (03/01/18 17:19:45)                 Impression:      Chronic lung changes with possible mild interstitial edema.      Electronically signed by: BENIGNO MCCABE MD  Date:      03/01/18  Time:    17:19              Narrative:    Chest PA and lateral.  Comparison: 7/2017.    Postsurgical changes are seen.  Cardiac silhouette is stable in size. Lungs are symmetrically expanded.  There is increased bilateral interstitial attenuation which may represent chronic changes versus mild interstitial edema.  No evidence of focal consolidative process, pneumothorax, or significant effusion.  Extensive degenerative changes are seen within the mid to lower thoracic spine.  No acute osseous abnormality identified.

## 2018-03-02 NOTE — PLAN OF CARE
Chief Complaint:        Chief Complaint   Patient presents with    Failure To Thrive       failure to thrive for about 6 months.  Home Health Nurse sent him to ED for evaluation.  Lower abdominal pressure     Pt lives with daughter Mary Anne Greer [phone], has HH/Palliative Care (HH is initiated  [facility])    Pt has BSC, Shower Chair, WC, Hosp Bed. Pt requires assistance with ADLs       03/02/18 1123   Discharge Assessment   Assessment Type Discharge Planning Assessment   Confirmed/corrected address and phone number on facesheet? Yes   Assessment information obtained from? Patient;Medical Record   Expected Length of Stay (days) 2   Communicated expected length of stay with patient/caregiver yes   Prior to hospitilization cognitive status: Alert/Oriented   Prior to hospitalization functional status: Needs Assistance;Assistive Equipment   Current cognitive status: Not Oriented to Time   Current Functional Status: Needs Assistance;Assistive Equipment   Facility Arrived From: (home: HH & Palliative care [facility]'s [facility])   Lives With child(debbie), adult  (daughter Mary Anne Greer [phone])   Able to Return to Prior Arrangements yes   Is patient able to care for self after discharge? No   Who are your caregiver(s) and their phone number(s)? ary Greer [phone]   Patient's perception of discharge disposition home health;other (comments)  ((Home: HH & Palliative care set [facility]'s [facility]))   Readmission Within The Last 30 Days no previous admission in last 30 days   Patient currently being followed by outpatient case management? Unable to determine (comments)   Patient currently receives any other outside agency services? Yes   Name and contact number of agency or person providing outside services (home: HH & Palliative care [facility]'s [facility])   Is it the patient/care giver preference to resume care with the current outside agency? Yes    Equipment Currently Used at Home bedside commode;shower chair;wheelchair;hospital bed   Do you have any problems affording any of your prescribed medications? No   Is the patient taking medications as prescribed? yes   Does the patient have transportation home? Yes   Transportation Available family or friend will provide   Dialysis Name and Scheduled days N/A   Does the patient receive services at the [facility]? No   Discharge Plan A Home Health   Discharge Plan B Home Health;Home with family   Patient/Family In Agreement With Plan yes     Vero Coffey RN Transitional Navigator  [phone]

## 2018-03-02 NOTE — ASSESSMENT & PLAN NOTE
WIth BUN/Cr of 26/1.5 on admission  Increased from baseline Cr of 1.0   Likely 2/2 dehydration  Monitor BUN/Cr with am lab draw.

## 2018-03-02 NOTE — ASSESSMENT & PLAN NOTE
Patient tachycardic and hypotensive on initial presentation to ED.  Norvasc and metoprolol held while in fluid depleted state  Consider resuming medications in am.

## 2018-03-02 NOTE — PT/OT/SLP PROGRESS
Occupational Therapy  Visit Attempt    Patient Name:  Raghav Greer   MRN:  [id number]    Patient not seen 1020 secondary to adamantly declining participation in OOB axs at this time  . Will follow-up as available.    Doreen Valencia OT  3/2/2018

## 2018-03-02 NOTE — H&P
[facility] Hospital Medicine  History & Physical    Patient Name: Raghav Greer  MRN: [id number]  Admission Date: 3/1/2018  Attending Physician: Marc Gonzalez III, MD   Primary Care Provider: Anselmo Valente MD         Patient information was obtained from caregiver / friend and ER records.     Subjective:     Principal Problem:JEREMY (acute kidney injury)    Chief Complaint:   Chief Complaint   Patient presents with    Failure To Thrive     failure to thrive for about 6 months.  Home Health Nurse sent him to ED for evaluation.  Lower abdominal pressure        HPI: 89 y.o. male with PMHx of CHF with preserved EF, HTN, and DM who was sent to the ED by his home nurse for evaluation for failure to thrive. His family states that he has unintentionally lost about 20 lbs over the past year, and has been experiencing decreased appetite, decreased PO intake, and dehydration. Deny  any fever, N/V/D, chills, chests pain, dysuria, hematuria, or SOB. Patient also complaining of lower abdominal pain that family believes is due to lack of BM for several days.         Past Medical History:   Diagnosis Date    CHF (congestive heart failure)     Diabetes mellitus     Hypertension        History reviewed. No pertinent surgical history.    Review of patient's allergies indicates:  No Known Allergies    No current facility-administered medications on file prior to encounter.      Current Outpatient Prescriptions on File Prior to Encounter   Medication Sig    metoprolol tartrate (LOPRESSOR) 50 MG tablet Take 50 mg by mouth 2 (two) times daily.    acetaminophen (TYLENOL) 325 MG tablet Take 325 mg by mouth every 6 (six) hours as needed for Pain.    docusate sodium (COLACE) 100 MG capsule Take 100 mg by mouth once daily at 6am.    furosemide (LASIX) 40 MG tablet Take 1 tablet (40 mg total) by mouth daily as needed (cough with copious white mucous).    hydrocodone-acetaminophen 5-325mg (NORCO) 5-325 mg per tablet  Take 1 tablet by mouth every 8 (eight) hours as needed for Pain.    mirtazapine (REMERON) 15 MG tablet Take 15 mg by mouth every evening.    pravastatin (PRAVACHOL) 80 MG tablet Take 80 mg by mouth once daily.    ranitidine (ZANTAC) 150 MG tablet Take 150 mg by mouth 2 (two) times daily.    sertraline (ZOLOFT) 50 MG tablet Take 25 mg by mouth every evening.      Family History     None        Social History Main Topics    Smoking status: Unknown If Ever Smoked    Smokeless tobacco: Not on file      Comment: Pt unable to answer questions.    Alcohol use No      Comment: Pt unable to answer questions.    Drug use: Unknown      Comment: Pt unable to answer questions.    Sexual activity: Not on file      Comment: Pt unable to answer questions.     Review of Systems   Constitutional: Positive for activity change and appetite change. Negative for chills, fatigue and fever.   HENT: Negative for congestion and sinus pressure.    Eyes: Negative for visual disturbance.   Respiratory: Negative for chest tightness and shortness of breath.    Cardiovascular: Negative for chest pain.   Gastrointestinal: Positive for abdominal distention and abdominal pain. Negative for blood in stool, diarrhea, nausea and vomiting.   Endocrine: Negative for polyuria.   Genitourinary: Negative for frequency.   Musculoskeletal: Negative for arthralgias and myalgias.   Skin: Negative for color change.   Neurological: Negative for dizziness, weakness and light-headedness.   Psychiatric/Behavioral: Negative for agitation and behavioral problems.     Objective:     Vital Signs (Most Recent):  Temp: 97.5 °F (36.4 °C) (03/01/18 2336)  Pulse: 76 (03/02/18 0126)  Resp: 15 (03/01/18 2336)  BP: (!) 156/76 (03/01/18 2336)  SpO2: 100 % (03/01/18 2336) Vital Signs (24h Range):  Temp:  [97.5 °F (36.4 °C)-98.7 °F (37.1 °C)] 97.5 °F (36.4 °C)  Pulse:  [] 76  Resp:  [15-18] 15  SpO2:  [98 %-100 %] 100 %  BP: (116-156)/(60-76) 156/76     Weight: 53 kg  (116 lb 13.5 oz)  Body mass index is 18.3 kg/m².    Physical Exam   Constitutional: He is oriented to person, place, and time.   Very thin male, cachectic appearing   HENT:   Head: Normocephalic and atraumatic.   Mouth/Throat: Mucous membranes are dry.   Neck: Normal range of motion. Neck supple.   Cardiovascular: Normal rate and regular rhythm.    Pulmonary/Chest: Effort normal and breath sounds normal. He has no rales.   Abdominal: Soft. Bowel sounds are normal. He exhibits no distension. There is tenderness (suprapubic). There is no guarding.   Musculoskeletal: He exhibits no edema or tenderness.   Neurological: He is alert and oriented to person, place, and time.   Skin: Skin is warm and dry.   Psychiatric: He has a normal mood and affect. His behavior is normal.        Recent Labs      03/01/18   1655   WBC  5.18   HGB  11.9*   HCT  37.4*   PLT  142*   MCV  99*   RDW  14.0       Recent Labs      03/01/18   1655   NA  141   K  3.3*   CL  99   CO2  31*   GLU  127*   BUN  26   CREATININE  1.5*   CALCIUM  10.7*   PROT  7.2   ALBUMIN  3.1*   BILITOT  1.1*   ALKPHOS  92   AST  18   ALT  9*   ANIONGAP  11   ESTGFRAFRICA  46*   EGFRNONAA  40*       Recent Labs      03/01/18   1655   MG  2.1       A1c:   Lab Results   Component Value Date    HGBA1C 4.8 03/01/2018   , Last Gluc: No results for input(s): POCTGLUCOSE in the last 168 hours.    TSH:   Lab Results   Component Value Date    TSH 2.734 03/01/2018       Cardiac Enzymes  Recent Labs      03/01/18   1655  03/01/18   2350   TROPONINI  0.043*  0.054*   CPK  44   --        Coags  No results for input(s): PT, INR, APTT in the last 168 hours.    UA    Recent Labs  Lab 03/01/18  1946   COLORU Straw   SPECGRAV 1.015   PHUR 6.0   PROTEINUA Trace*   BACTERIA Few*       Micro  Microbiology Results (last 7 days)     ** No results found for the last 168 hours. **           ABG  No results for input(s): PH, PCO2, PO2, HCO3, POCSATURATED, BE in the last 168 hours.    Imaging  Results          CT Renal Stone Study ABD Pelvis WO (Final result)  Result time 03/01/18 19:44:53    Final result by Benigno Mccabe MD (03/01/18 19:44:53)                 Impression:      1.  Moderate volume retained stool within the colon with large volume stool seen within the rectum which may be seen in setting of constipation with fecal impaction.  No significant rectal wall thickening or surrounding inflammatory changes.  No evidence of small bowel obstruction.    2.  Right middle lobe 1.1 cm nodular opacity.  No prior studies are available for comparison.  Potential neoplastic process not excluded.  Given patient's advanced age, clinical considerations should determine need for future surveillance.    3.  Large hiatal hernia.    4.  Additional findings as detailed above.        Electronically signed by: BENIGNO MCCABE MD  Date:     03/01/18  Time:    19:44              Narrative:    Clinical indication: 89 year-old male with abdominal pain.    Comparison: None.    Technique: Transaxial images were obtained through the abdomen and pelvis in 5 mm increments without the use of p.o. or IV contrast.    Findings:   Postsurgical changes and significant coronary artery calcification is seen.  Mild chronic appearing changes are seen at the lung bases.  There is nodular opacity measuring 1.1 cm within the right middle lobe.    Evaluation of the abdomen and pelvis is limited by lack of intravenous and oral contrast as well as patient's arms at his side during the time of image acquisition.    No focal hepatic abnormalities identified on this noncontrast exam.  There is no intra-or extrahepatic biliary ductal dilatation.  The gallbladder is unremarkable.  Large hiatal hernia is visualized.  Spleen and adrenal glands are unremarkable.  Atrophic changes are seen of the pancreas.    Bilateral renal cysts are visualized.  Small nonobstructing stones versus vascular calcifications are seen in the kidneys.  No stones are seen  along the ureteral courses.  Urinary bladder is unremarkable.  Prostate is mildly enlarged.      Large volume of retained stool is visualized within the rectum which may be seen in setting of fecal impaction.  No significant rectal wall thickening or surrounding inflammatory changes are appreciated.  Additional moderate volume retained stool is seen throughout the colon.  No evidence of bowel obstruction.  No free air or free fluid.    Extensive atherosclerosis is seen of the aorta.  Aorta is tortuous with ectasia of the infrarenal abdominal aorta measuring 2.7 cm.  Severe atherosclerosis is seen throughout the bilateral iliacs.     Extensive multilevel degenerative changes are seen within the spine.  Subcutaneous soft tissue structures are unremarkable.                             X-Ray Chest PA And Lateral (Final result)  Result time 03/01/18 17:19:45    Final result by Benigno Mccabe MD (03/01/18 17:19:45)                 Impression:      Chronic lung changes with possible mild interstitial edema.      Electronically signed by: BENIGNO MCCABE MD  Date:     03/01/18  Time:    17:19              Narrative:    Chest PA and lateral.  Comparison: 7/2017.    Postsurgical changes are seen.  Cardiac silhouette is stable in size. Lungs are symmetrically expanded.  There is increased bilateral interstitial attenuation which may represent chronic changes versus mild interstitial edema.  No evidence of focal consolidative process, pneumothorax, or significant effusion.  Extensive degenerative changes are seen within the mid to lower thoracic spine.  No acute osseous abnormality identified.                                    Assessment/Plan:     * JEREMY (acute kidney injury)    WIth BUN/Cr of 26/1.5 on admission  Increased from baseline Cr of 1.0   Likely 2/2 dehydration  Monitor BUN/Cr with am lab draw.           Dehydration    S/p 2 500 cc boluses in ED  Started on fluids at 75 cc/hr  Monitor urine output overnight           Failure to thrive in adult    Family concerned about unintentional weight loss and decreased appetite over the last year  Dehydrated on exam  Fluid resuscitated in ED  Started on cardiac diet          Advanced dementia    Resumed home Remeron        Elevated troponin    Likely 2/2 volume depletion  EKG sinus tachycardia            Essential hypertension    Patient tachycardic and hypotensive on initial presentation to ED.  Norvasc and metoprolol held while in fluid depleted state  Consider resuming medications in am.          Hyperlipemia    Resumed on home statin        Chronic diastolic congestive heart failure    Echo 2017 with normal systolic function (EF 55-60%)  Fluids given in 500 cc boluses given history of CHF  Started on 75 cc/hr maintenance fluids to slowly rehydrate.            VTE Risk Mitigation         Ordered     Place sequential compression device  Until discontinued      03/01/18 2335     Medium Risk of VTE  Once      03/01/18 2335     Place sequential compression device  Until discontinued      03/01/18 2335             Jonnathan Angel MD  Department of Hospital Medicine   [facility]

## 2018-03-02 NOTE — PLAN OF CARE
Problem: Fall Risk (Adult)  Goal: Identify Related Risk Factors and Signs and Symptoms  Related risk factors and signs and symptoms are identified upon initiation of Human Response Clinical Practice Guideline (CPG)   PT shall not fall during shift.   03/02/18 0041   Fall Risk   Related Risk Factors (Fall Risk) age-related changes;confusion/agitation;culprit medication(s);fatigue/slow reaction;gait/mobility problems   Signs and Symptoms (Fall Risk) presence of risk factors

## 2018-03-02 NOTE — PT/OT/SLP PROGRESS
Physical Therapy Screen  Discharge    Patient Name:  Raghav Greer   MRN:  [id number]    Patient seen bedside alert and confused. Patient poor historian. Isolated movements of all extremities intact with tightness ankles and knees bilaterally. Patient dependent with rolling and appears to be at his functional baseline. No acute skilled therapy needs. Will DC PT service at this time.    Roderick Fuller, PT

## 2018-03-02 NOTE — CONSULTS
[facility] Pulmonology Consult Note - Resident MELODY    Reason for Consult     Pulmonary nodule    History of Present Illness:     Raghav Greer is a 89 y.o.  male with PMHx HFpEF, HTN, DM2, dementia who presented to [facility] on 3/1/2018 with a primary complaint of weight loss.    The patient was in their usual state of health until the past year when he began to experience decreased PO intake.  Some difficulty feeding himself, but also with decreased appetite and ~20 # weight loss over 6-8 months.  History obtained from daughter-in-law (Mary Anne Greer) with whom the patient lives. At his baseline, he requires full assistance with ADLs; wheelchair bound but can assist with transfers; feeds self.  Denies F/C/NS, N/V/D, black/bloody stools, lightheadedness, cough, sputum production.  Does endorse constipation, weakness.  Patient has history of colon polyps removed on colonoscopy, but otherwise denies cancer.    Past Medical History:  Past Medical History:   Diagnosis Date    CHF (congestive heart failure)     Diabetes mellitus     Hypertension      Past Surgical History:  Hernia repair    Allergies:  No Known Allergies    Home Medications:  Prior to Admission medications    Medication Sig Start Date End Date Taking? Authorizing Provider   amLODIPine (NORVASC) 10 MG tablet Take 10 mg by mouth once daily.   Yes Historical Provider, MD   brimonidine 0.2% (ALPHAGAN) 0.2 % Drop 1 drop every 8 (eight) hours.   Yes Historical Provider, MD   finasteride (PROSCAR) 5 mg tablet Take 5 mg by mouth once daily.   Yes Historical Provider, MD   metoprolol tartrate (LOPRESSOR) 50 MG tablet Take 50 mg by mouth 2 (two) times daily.   Yes Historical Provider, MD   omeprazole (PRILOSEC) 20 MG capsule Take 20 mg by mouth once daily.   Yes Historical Provider, MD   polyvinyl alcohol, artificial tears, (LIQUIFILM TEARS) 1.4 % ophthalmic solution 1 drop as needed.   Yes Historical Provider, MD   simvastatin (ZOCOR) 80 MG  "tablet Take 80 mg by mouth every evening.   Yes Historical Provider, MD   terazosin (HYTRIN) 5 MG capsule Take 5 mg by mouth every evening.   Yes Historical Provider, MD   travoprost, benzalkonium, (TRAVATAN) 0.004 % ophthalmic solution 1 drop every evening.   Yes Historical Provider, MD   acetaminophen (TYLENOL) 325 MG tablet Take 325 mg by mouth every 6 (six) hours as needed for Pain.    Historical Provider, MD   docusate sodium (COLACE) 100 MG capsule Take 100 mg by mouth once daily at 6am.    Historical Provider, MD   furosemide (LASIX) 40 MG tablet Take 1 tablet (40 mg total) by mouth daily as needed (cough with copious white mucous). 7/10/17 7/10/18  Mabel Del Castillo PA-C   hydrocodone-acetaminophen 5-325mg (NORCO) 5-325 mg per tablet Take 1 tablet by mouth every 8 (eight) hours as needed for Pain.    Historical Provider, MD   mirtazapine (REMERON) 15 MG tablet Take 15 mg by mouth every evening.    Historical Provider, MD   pravastatin (PRAVACHOL) 80 MG tablet Take 80 mg by mouth once daily.    Historical Provider, MD   ranitidine (ZANTAC) 150 MG tablet Take 150 mg by mouth 2 (two) times daily.    Historical Provider, MD   sertraline (ZOLOFT) 50 MG tablet Take 25 mg by mouth every evening.     Historical Provider, MD     Family History:  Denies family history of cancer.  Reports multiple family members with heart disease.    Social History:  40 packyear smoking history, quit ~40 years ago.  Denies EtOH, illicits.  Lives with son and daughter-in law.    Review of Systems:  Pertinent items are noted in HPI. All other systems are reviewed and are negative.    Objective:   Last 24 Hour Vital Signs:  BP  Min: 116/63  Max: 180/94  Temp  Av.6 °F (36.4 °C)  Min: 96 °F (35.6 °C)  Max: 98.7 °F (37.1 °C)  Pulse  Av.5  Min: 74  Max: 119  Resp  Avg: 15.4  Min: 12  Max: 18  SpO2  Av %  Min: 98 %  Max: 100 %  Height  Av' 6" (167.6 cm)  Min: 5' 5" (165.1 cm)  Max: 5' 7" (170.2 cm)  Weight  Av.9 kg " (120 lb 14.8 oz)  Min: 53 kg (116 lb 13.5 oz)  Max: 56.7 kg (125 lb)    Intake/Output Summary (Last 24 hours) at 03/02/18 1217  Last data filed at 03/02/18 0737   Gross per 24 hour   Intake          1208.33 ml   Output              431 ml   Net           777.33 ml       Physical Examination:  General: awake, alert, in NAD  HEENT: anicteric sclera, normal conjunctiva, MMM, OP clear   Neck: supple, no thyromegaly  CV: RRR, no murmur  Resp: CTAB, no wheezes or rales  Abd: soft, NT, ND, hypoactive BS  Extr: symmetric, no c/c/e  Neuro: AAO to self and place, non-focal CN exam, normal sensation and coordination  Skin: intact, no rash or lesion    Laboratory:  Laboratory Data:     Recent Labs  Lab 03/02/18  0627   WBC 3.67*   RBC 3.76*   HGB 11.9*   HCT 36.9*   *   MCV 98   MCH 31.6*   MCHC 32.2       Recent Labs  Lab 03/02/18  0627      K 4.0      CO2 34*   BUN 14   CREATININE 0.9   MG 2.0     Microbiology Data:  Lactate 3.8    Other Results:  Radiology Data:   CXR (3/1) - bilateral interstitial changes  CT Renal (3/1) - RML nodule 1.1cm near periphery    2D Echo (7/2017):     1 - Severe left atrial enlargement.     2 - Concentric remodeling.     3 - No wall motion abnormalities.     4 - Normal left ventricular systolic function (EF 55-60%).     5 - Impaired LV relaxation, elevated LAP (grade 2 diastolic dysfunction).     6 - Normal right ventricular systolic function .     7 - Pulmonary hypertension. The estimated PA systolic pressure is 43 mmHg.     8 - Mild tricuspid regurgitation.     Current Medications:     Infusions:   sodium chloride 0.9% 75 mL/hr at 03/02/18 0110        Scheduled:   amLODIPine  10 mg Oral Daily    metoprolol tartrate  50 mg Oral BID    mirtazapine  15 mg Oral QHS    pantoprazole  40 mg Oral Daily    sertraline  25 mg Oral QHS    sodium phosphates  1 enema Rectal Once        PRN:  dextrose 50%, dextrose 50%, glucagon (human recombinant), glucose, glucose, magnesium  hydroxide 400 mg/5 ml, ondansetron, sodium chloride 0.9%    Antibiotics and Day Number of Therapy:  Piperacillin-tazobactam x1    Lines and Day Number of Therapy:  PIV    Assessment/Plan:     Raghav Greer is a 89 y.o.male with    Solitary Pulmonary Nodule  - 1.1cm nodule in RML and history of smoking makes this a high risk for malignancy.  - ECOG status of 4.  Would not likely qualify for aggressive treatment of malignancy, if found.  Risk>benefit of biopsy to determine diagnosis and functional status likely precludes treatment.    Abigail Tolbert  [facility] HO-II  [facility]

## 2018-03-03 LAB
ALBUMIN SERPL BCP-MCNC: 2.5 G/DL
ALP SERPL-CCNC: 73 U/L
ALT SERPL W/O P-5'-P-CCNC: 8 U/L
ANION GAP SERPL CALC-SCNC: 10 MMOL/L
ANION GAP SERPL CALC-SCNC: 7 MMOL/L
AST SERPL-CCNC: 14 U/L
BASOPHILS # BLD AUTO: 0.01 K/UL
BASOPHILS NFR BLD: 0.2 %
BILIRUB SERPL-MCNC: 0.6 MG/DL
BUN SERPL-MCNC: 17 MG/DL
BUN SERPL-MCNC: 19 MG/DL
CALCIUM SERPL-MCNC: 9.1 MG/DL
CALCIUM SERPL-MCNC: 9.6 MG/DL
CHLORIDE SERPL-SCNC: 105 MMOL/L
CHLORIDE SERPL-SCNC: 105 MMOL/L
CO2 SERPL-SCNC: 28 MMOL/L
CO2 SERPL-SCNC: 33 MMOL/L
COMPLEXED PSA SERPL-MCNC: 1.1 NG/ML
CREAT SERPL-MCNC: 1 MG/DL
CREAT SERPL-MCNC: 1.1 MG/DL
DIFFERENTIAL METHOD: ABNORMAL
EOSINOPHIL # BLD AUTO: 0.1 K/UL
EOSINOPHIL NFR BLD: 2 %
ERYTHROCYTE [DISTWIDTH] IN BLOOD BY AUTOMATED COUNT: 14 %
EST. GFR  (AFRICAN AMERICAN): >60 ML/MIN/1.73 M^2
EST. GFR  (AFRICAN AMERICAN): >60 ML/MIN/1.73 M^2
EST. GFR  (NON AFRICAN AMERICAN): 58 ML/MIN/1.73 M^2
EST. GFR  (NON AFRICAN AMERICAN): >60 ML/MIN/1.73 M^2
GLUCOSE SERPL-MCNC: 103 MG/DL
GLUCOSE SERPL-MCNC: 115 MG/DL
HCT VFR BLD AUTO: 32.5 %
HGB BLD-MCNC: 10.3 G/DL
LACTATE SERPL-SCNC: 1.8 MMOL/L
LACTATE SERPL-SCNC: 2 MMOL/L
LACTATE SERPL-SCNC: 2 MMOL/L
LACTATE SERPL-SCNC: 2.6 MMOL/L
LACTATE SERPL-SCNC: 2.6 MMOL/L
LYMPHOCYTES # BLD AUTO: 1.1 K/UL
LYMPHOCYTES NFR BLD: 18.6 %
MAGNESIUM SERPL-MCNC: 1.9 MG/DL
MAGNESIUM SERPL-MCNC: 2.1 MG/DL
MCH RBC QN AUTO: 31.1 PG
MCHC RBC AUTO-ENTMCNC: 31.7 G/DL
MCV RBC AUTO: 98 FL
MONOCYTES # BLD AUTO: 1.4 K/UL
MONOCYTES NFR BLD: 23.8 %
NEUTROPHILS # BLD AUTO: 3.3 K/UL
NEUTROPHILS NFR BLD: 55.2 %
PHOSPHATE SERPL-MCNC: 2.4 MG/DL
PLATELET # BLD AUTO: 121 K/UL
PMV BLD AUTO: 11.5 FL
POCT GLUCOSE: 85 MG/DL (ref 70–110)
POTASSIUM SERPL-SCNC: 3.2 MMOL/L
POTASSIUM SERPL-SCNC: 4.1 MMOL/L
PROT SERPL-MCNC: 5.7 G/DL
RBC # BLD AUTO: 3.31 M/UL
SODIUM SERPL-SCNC: 143 MMOL/L
SODIUM SERPL-SCNC: 145 MMOL/L
WBC # BLD AUTO: 5.97 K/UL

## 2018-03-03 PROCEDURE — 25000003 PHARM REV CODE 250: Performed by: STUDENT IN AN ORGANIZED HEALTH CARE EDUCATION/TRAINING PROGRAM

## 2018-03-03 PROCEDURE — 84153 ASSAY OF PSA TOTAL: CPT

## 2018-03-03 PROCEDURE — 25000003 PHARM REV CODE 250: Performed by: FAMILY MEDICINE

## 2018-03-03 PROCEDURE — 93005 ELECTROCARDIOGRAM TRACING: CPT

## 2018-03-03 PROCEDURE — 83605 ASSAY OF LACTIC ACID: CPT | Mod: 91

## 2018-03-03 PROCEDURE — 85025 COMPLETE CBC W/AUTO DIFF WBC: CPT

## 2018-03-03 PROCEDURE — 83735 ASSAY OF MAGNESIUM: CPT | Mod: 91

## 2018-03-03 PROCEDURE — 63600175 PHARM REV CODE 636 W HCPCS: Performed by: STUDENT IN AN ORGANIZED HEALTH CARE EDUCATION/TRAINING PROGRAM

## 2018-03-03 PROCEDURE — 83735 ASSAY OF MAGNESIUM: CPT

## 2018-03-03 PROCEDURE — 84100 ASSAY OF PHOSPHORUS: CPT

## 2018-03-03 PROCEDURE — 93010 ELECTROCARDIOGRAM REPORT: CPT | Mod: ,,, | Performed by: INTERNAL MEDICINE

## 2018-03-03 PROCEDURE — 80053 COMPREHEN METABOLIC PANEL: CPT

## 2018-03-03 PROCEDURE — 36415 COLL VENOUS BLD VENIPUNCTURE: CPT

## 2018-03-03 PROCEDURE — 11000001 HC ACUTE MED/SURG PRIVATE ROOM

## 2018-03-03 PROCEDURE — 80048 BASIC METABOLIC PNL TOTAL CA: CPT

## 2018-03-03 PROCEDURE — 94761 N-INVAS EAR/PLS OXIMETRY MLT: CPT

## 2018-03-03 RX ORDER — POTASSIUM CHLORIDE 20 MEQ/1
20 TABLET, EXTENDED RELEASE ORAL ONCE
Status: COMPLETED | OUTPATIENT
Start: 2018-03-03 | End: 2018-03-03

## 2018-03-03 RX ORDER — HEPARIN SODIUM 5000 [USP'U]/ML
5000 INJECTION, SOLUTION INTRAVENOUS; SUBCUTANEOUS EVERY 8 HOURS
Status: DISCONTINUED | OUTPATIENT
Start: 2018-03-03 | End: 2018-03-05 | Stop reason: HOSPADM

## 2018-03-03 RX ORDER — LANOLIN ALCOHOL/MO/W.PET/CERES
400 CREAM (GRAM) TOPICAL ONCE
Status: COMPLETED | OUTPATIENT
Start: 2018-03-03 | End: 2018-03-03

## 2018-03-03 RX ADMIN — PANTOPRAZOLE SODIUM 40 MG: 40 TABLET, DELAYED RELEASE ORAL at 09:03

## 2018-03-03 RX ADMIN — SODIUM CHLORIDE, SODIUM LACTATE, POTASSIUM CHLORIDE, AND CALCIUM CHLORIDE: .6; .31; .03; .02 INJECTION, SOLUTION INTRAVENOUS at 11:03

## 2018-03-03 RX ADMIN — MIRTAZAPINE 15 MG: 15 TABLET, FILM COATED ORAL at 08:03

## 2018-03-03 RX ADMIN — SODIUM CHLORIDE, SODIUM LACTATE, POTASSIUM CHLORIDE, AND CALCIUM CHLORIDE: .6; .31; .03; .02 INJECTION, SOLUTION INTRAVENOUS at 03:03

## 2018-03-03 RX ADMIN — POTASSIUM CHLORIDE 20 MEQ: 20 TABLET, EXTENDED RELEASE ORAL at 12:03

## 2018-03-03 RX ADMIN — AMLODIPINE BESYLATE 10 MG: 5 TABLET ORAL at 09:03

## 2018-03-03 RX ADMIN — METOPROLOL TARTRATE 50 MG: 50 TABLET ORAL at 09:03

## 2018-03-03 RX ADMIN — SERTRALINE HYDROCHLORIDE 25 MG: 25 TABLET ORAL at 08:03

## 2018-03-03 RX ADMIN — MAGNESIUM OXIDE TAB 400 MG (241.3 MG ELEMENTAL MG) 400 MG: 400 (241.3 MG) TAB at 12:03

## 2018-03-03 NOTE — PLAN OF CARE
Problem: Patient Care Overview  Goal: Plan of Care Review  Outcome: Ongoing (interventions implemented as appropriate)  Reviewed plan of care with pt. Will continue to provide  IV fluids. Will continue to monitor HR and rhythm. Pt denies any chest pain. Pt turned 2 qh. Safety measures are in place, bed low and in lock position, call light in reach, and bed alarm is on. Pt verbalizes full understanding of their plan of care.

## 2018-03-03 NOTE — PROGRESS NOTES
PGY-2 Progress Note  [facility]  Follow up for:   Chief Complaint   Patient presents with    Failure To Thrive     failure to thrive for about 6 months.  Home Health Nurse sent him to ED for evaluation.  Lower abdominal pressure       Hospital Stay Day 1      Subjective: NAEON. Patient seen and examined. No complaints this morning.  Denies fever, headache, nausea, vomiting, chest pain, abdominal pain, dysuria, constipation, diarrhea    Scheduled Meds:   amLODIPine  10 mg Oral Daily    metoprolol tartrate  50 mg Oral BID    mirtazapine  15 mg Oral QHS    pantoprazole  40 mg Oral Daily    sertraline  25 mg Oral QHS     Continuous Infusions:   lactated ringers 50 mL/hr at 18 0339     PRN Meds:dextrose 50%, dextrose 50%, glucagon (human recombinant), glucose, glucose, magnesium hydroxide 400 mg/5 ml, ondansetron, sodium chloride 0.9%    Review of patient's allergies indicates:  No Known Allergies    Objectives:     Vitals(Most Recent)      BP  Min: 120/90  Max: 157/76  Temp  Av.7 °F (36.5 °C)  Min: 97.2 °F (36.2 °C)  Max: 98.2 °F (36.8 °C)  Pulse  Av.3  Min: 74  Max: 85  Resp  Av.9  Min: 16  Max: 18  SpO2  Av.3 %  Min: 96 %  Max: 100 %             Vitals([id number])  Temp:  [97.2 °F (36.2 °C)-98.2 °F (36.8 °C)]   Pulse:  [74-85]   Resp:  [16-18]   BP: (120-157)/(60-90)   SpO2:  [96 %-100 %]     I & O([id number])    Intake/Output Summary (Last 24 hours) at 18 0746  Last data filed at 18 0400   Gross per 24 hour   Intake             1510 ml   Output              425 ml   Net             1085 ml       Physical Exam:   General: awake and oriented to person, place and time, nad  HEENT: temporal wasting, atraoumatic, perrla   CV: RRR.No M/R/G.   Chest: NL effort. CTAB. No R/R/W.   Abd: +BS x 4. Soft. ND, mildly tender   Ext: moving well.   Skin: Intact. No rash. No lesions.   Neuro: No focal deficit.    LABS  CBC    Recent Labs  Lab 18  1655 18  0627 18  0209   WBC 5.18  3.67* 5.97   RBC 3.79* 3.76* 3.31*   HGB 11.9* 11.9* 10.3*   HCT 37.4* 36.9* 32.5*   * 138* 121*   MCV 99* 98 98   MCH 31.4* 31.6* 31.1*   MCHC 31.8* 32.2 31.7*     CMP    Recent Labs  Lab 03/01/18  1655 03/02/18  0627 03/03/18  0209    141 143   K 3.3* 4.0 3.2*   CO2 31* 34* 28   CL 99 101 105   BUN 26 14 19   CREATININE 1.5* 0.9 1.0   * 88 103       Recent Labs  Lab 03/01/18  1655 03/02/18  0627 03/03/18  0209   CALCIUM 10.7* 9.4 9.1   MG 2.1 2.0 1.9   PHOS  --  3.2 2.4*       Recent Labs  Lab 03/01/18  1655 03/02/18  0627 03/03/18  0209   PROT 7.2 7.3 5.7*   ALBUMIN 3.1* 3.8 2.5*   BILITOT 1.1* 0.7 0.6   AST 18 30 14   ALKPHOS 92 55 73   ALT 9* 32 8*     CE    Recent Labs  Lab 03/01/18  1655 03/01/18  2350 03/02/18  0627   TROPONINI 0.043* 0.054* 0.038*     BNP    Recent Labs  Lab 03/01/18  1655   *       LAST HbA1c  Lab Results   Component Value Date    HGBA1C 4.8 03/01/2018       Results for MYNOR FIGUEROA (MRN [id number]) as of 3/3/2018 07:46   Ref. Range 3/2/2018 21:02 3/3/2018 02:09 3/3/2018 04:24   Lactate, Tom Latest Ref Range: 0.5 - 2.2 mmol/L 3.0 (H) 1.8 2.0       Imaging  CT renal stone: 1.  Moderate volume retained stool within the colon with large volume stool seen within the rectum which may be seen in setting of constipation with fecal impaction.  No significant rectal wall thickening or surrounding inflammatory changes.  No evidence of small bowel obstruction.    2.  Right middle lobe 1.1 cm nodular opacity.  No prior studies are available for comparison.  Potential neoplastic process not excluded.  Given patient's advanced age, clinical considerations should determine need for future surveillance.    3.  Large hiatal hernia.    Micro:  - bx pending    Assessment/Plan: 89 y.o. male with PMHx of CHF with preserved EF, HTN, and DM sent to ED from home for significant weight loss and FTT and found to have JEREMY.  With CT renal findings of constipation and 1.1 pulm nodule.      FTT  - cardiac monitor  - encourage increased diet and boost   - daily wts  - pt/ot  - discuss goals of care with family  - elevated lactate, no signs of infectious process at this time, will trend q4 hours.    Pulmonary nodule  - 1.1 cm nodule seen on CT suggestive of neoplasm  - pulm consulted, recs appreciated.       HTN  - bp stable  - cw home amlopine and lopressor    JEREMY  - BUN/Cr of 26/1.5 on admission  - baseline Cr of 1.0   - Likely 2/2 dehydration  - This AM: 19/ 1    Constipation  - CT renal study showed stool in colon  - enema ordered  - will continue stool softeners  - abdominal xray this morning     HFpEF  - 7/10/17; echo EF 55 dd  - will gently hydrate and monitor for volume overload    Code: full  Diet: regular diet plus boost   Ppx: scd, pantoprozole  Dispo: Follow up goals of care, monitor stool output, follow up LA, abdominal Xray this morning.    Case d/w staff.     Migue Kilgore Jr., MD  [facility] HO-2  03/03/2018 1:36 AM

## 2018-03-03 NOTE — NURSING
Notified Dr. Kilgore that the pt's HR fluctuating from sinus joan to sinus tach. Pt is also in and out of A. Fib.

## 2018-03-03 NOTE — PLAN OF CARE
Problem: Pressure Ulcer Risk (Chuck Scale) (Adult,Obstetrics,Pediatric)  Goal: Identify Related Risk Factors and Signs and Symptoms  Related risk factors and signs and symptoms are identified upon initiation of Human Response Clinical Practice Guideline (CPG)    03/03/18 0008   Pressure Ulcer Risk (Chuck Scale)   Related Risk Factors (Pressure Ulcer Risk (Chuck Scale)) age extremes;body weight extremes;fluid intake inadequate;mobility impaired;nutritional deficiencies;other (see comments)  (failure to thrive)     Outcome: Ongoing (interventions implemented as appropriate)  Pt turned Q2h to prevent skin breakdown. Foam dressing changed to sacrum, dressing CDI.

## 2018-03-03 NOTE — NURSING
Plan of care reviewed with patient. Patient verbalized understanding. Fall precautions maintained. Bed in lowest position, call light within reach, 2x bed rails, pt wearing slip resistant socks. Patient notified to ask staff for assistance and pt verbalized complete understanding. Pt on telemetry, Afib. No acute distress noted.

## 2018-03-04 LAB
ALBUMIN SERPL BCP-MCNC: 2.4 G/DL
ALP SERPL-CCNC: 75 U/L
ALT SERPL W/O P-5'-P-CCNC: 7 U/L
ANION GAP SERPL CALC-SCNC: 7 MMOL/L
AST SERPL-CCNC: 20 U/L
BASOPHILS # BLD AUTO: 0.01 K/UL
BASOPHILS NFR BLD: 0.2 %
BILIRUB SERPL-MCNC: 0.5 MG/DL
BUN SERPL-MCNC: 20 MG/DL
CALCIUM SERPL-MCNC: 9 MG/DL
CHLORIDE SERPL-SCNC: 108 MMOL/L
CO2 SERPL-SCNC: 28 MMOL/L
CREAT SERPL-MCNC: 1.1 MG/DL
DIFFERENTIAL METHOD: ABNORMAL
EOSINOPHIL # BLD AUTO: 0.1 K/UL
EOSINOPHIL NFR BLD: 1.9 %
ERYTHROCYTE [DISTWIDTH] IN BLOOD BY AUTOMATED COUNT: 14 %
EST. GFR  (AFRICAN AMERICAN): >60 ML/MIN/1.73 M^2
EST. GFR  (NON AFRICAN AMERICAN): 58 ML/MIN/1.73 M^2
GLUCOSE SERPL-MCNC: 89 MG/DL
HCT VFR BLD AUTO: 34 %
HGB BLD-MCNC: 10.7 G/DL
LYMPHOCYTES # BLD AUTO: 1.4 K/UL
LYMPHOCYTES NFR BLD: 26.2 %
MAGNESIUM SERPL-MCNC: 1.9 MG/DL
MCH RBC QN AUTO: 31 PG
MCHC RBC AUTO-ENTMCNC: 31.5 G/DL
MCV RBC AUTO: 99 FL
MONOCYTES # BLD AUTO: 0.6 K/UL
MONOCYTES NFR BLD: 11.2 %
NEUTROPHILS # BLD AUTO: 3.2 K/UL
NEUTROPHILS NFR BLD: 60.3 %
PHOSPHATE SERPL-MCNC: 2.5 MG/DL
PLATELET # BLD AUTO: 123 K/UL
PMV BLD AUTO: 11.1 FL
POTASSIUM SERPL-SCNC: 3.8 MMOL/L
PROT SERPL-MCNC: 5.8 G/DL
RBC # BLD AUTO: 3.45 M/UL
SODIUM SERPL-SCNC: 143 MMOL/L
WBC # BLD AUTO: 5.26 K/UL

## 2018-03-04 PROCEDURE — 80053 COMPREHEN METABOLIC PANEL: CPT

## 2018-03-04 PROCEDURE — 11000001 HC ACUTE MED/SURG PRIVATE ROOM

## 2018-03-04 PROCEDURE — 36415 COLL VENOUS BLD VENIPUNCTURE: CPT

## 2018-03-04 PROCEDURE — 63600175 PHARM REV CODE 636 W HCPCS: Performed by: STUDENT IN AN ORGANIZED HEALTH CARE EDUCATION/TRAINING PROGRAM

## 2018-03-04 PROCEDURE — 25000003 PHARM REV CODE 250: Performed by: STUDENT IN AN ORGANIZED HEALTH CARE EDUCATION/TRAINING PROGRAM

## 2018-03-04 PROCEDURE — 83735 ASSAY OF MAGNESIUM: CPT

## 2018-03-04 PROCEDURE — 85025 COMPLETE CBC W/AUTO DIFF WBC: CPT

## 2018-03-04 PROCEDURE — 84100 ASSAY OF PHOSPHORUS: CPT

## 2018-03-04 PROCEDURE — 94761 N-INVAS EAR/PLS OXIMETRY MLT: CPT

## 2018-03-04 RX ORDER — METOPROLOL TARTRATE 25 MG/1
25 TABLET, FILM COATED ORAL 2 TIMES DAILY
Status: DISCONTINUED | OUTPATIENT
Start: 2018-03-04 | End: 2018-03-05 | Stop reason: HOSPADM

## 2018-03-04 RX ADMIN — PANTOPRAZOLE SODIUM 40 MG: 40 TABLET, DELAYED RELEASE ORAL at 08:03

## 2018-03-04 RX ADMIN — MIRTAZAPINE 15 MG: 15 TABLET, FILM COATED ORAL at 09:03

## 2018-03-04 RX ADMIN — HEPARIN SODIUM 5000 UNITS: 5000 INJECTION, SOLUTION INTRAVENOUS; SUBCUTANEOUS at 07:03

## 2018-03-04 RX ADMIN — METOPROLOL TARTRATE 25 MG: 25 TABLET ORAL at 08:03

## 2018-03-04 RX ADMIN — AMLODIPINE BESYLATE 10 MG: 5 TABLET ORAL at 08:03

## 2018-03-04 RX ADMIN — HEPARIN SODIUM 5000 UNITS: 5000 INJECTION, SOLUTION INTRAVENOUS; SUBCUTANEOUS at 09:03

## 2018-03-04 RX ADMIN — HEPARIN SODIUM 5000 UNITS: 5000 INJECTION, SOLUTION INTRAVENOUS; SUBCUTANEOUS at 01:03

## 2018-03-04 RX ADMIN — SERTRALINE HYDROCHLORIDE 25 MG: 25 TABLET ORAL at 09:03

## 2018-03-04 RX ADMIN — POTASSIUM PHOSPHATE, MONOBASIC 500 MG: 500 TABLET, SOLUBLE ORAL at 04:03

## 2018-03-04 NOTE — PROGRESS NOTES
PGY-2 Progress Note  [facility]  Follow up for:   Chief Complaint   Patient presents with    Failure To Thrive     failure to thrive for about 6 months.  Home Health Nurse sent him to ED for evaluation.  Lower abdominal pressure       Hospital Stay Day 2    Subjective: NAEON. Adequate uop, bm x 1. Patient seen and examined. Patient states that he is feeling cold this morning.   Denies fever, headache, nausea, vomiting, chest pain, abdominal pain, dysuria, constipation, diarrhea    Scheduled Meds:   amLODIPine  10 mg Oral Daily    heparin (porcine)  5,000 Units Subcutaneous Q8H    metoprolol tartrate  25 mg Oral BID    mirtazapine  15 mg Oral QHS    pantoprazole  40 mg Oral Daily    sertraline  25 mg Oral QHS     Continuous Infusions:   lactated ringers 50 mL/hr at 18 2300     PRN Meds:dextrose 50%, dextrose 50%, glucagon (human recombinant), glucose, glucose, magnesium hydroxide 400 mg/5 ml, ondansetron, sodium chloride 0.9%    Review of patient's allergies indicates:  No Known Allergies    Objectives:     Vitals(Most Recent)      BP  Min: 120/57  Max: 146/71  Temp  Av.4 °F (36.3 °C)  Min: 96.4 °F (35.8 °C)  Max: 98.1 °F (36.7 °C)  Pulse  Av  Min: 32  Max: 85  Resp  Av  Min: 16  Max: 18  SpO2  Av.3 %  Min: 97 %  Max: 100 %             Vitals([id number])  Temp:  [96.4 °F (35.8 °C)-98.1 °F (36.7 °C)]   Pulse:  [32-85]   Resp:  [16-18]   BP: (120-146)/(57-71)   SpO2:  [97 %-100 %]     I & O([id number])    Intake/Output Summary (Last 24 hours) at 18 0845  Last data filed at 18 0500   Gross per 24 hour   Intake             1075 ml   Output              501 ml   Net              574 ml       Physical Exam:   General: awake and oriented to person, place and time, nad  HEENT: temporal wasting, atraoumatic, perrla   CV: RRR.No M/R/G.   Chest: NL effort. CTAB. No R/R/W.   Abd: +BS x 4. Soft. ND, mildly tender   Ext: moving well.   Skin: Intact. No rash. No lesions.   Neuro: No focal  deficit.    LABS  CBC    Recent Labs  Lab 03/02/18  0627 03/03/18  0209 03/04/18  0818   WBC 3.67* 5.97 5.26   RBC 3.76* 3.31* 3.45*   HGB 11.9* 10.3* 10.7*   HCT 36.9* 32.5* 34.0*   * 121* 123*   MCV 98 98 99*   MCH 31.6* 31.1* 31.0   MCHC 32.2 31.7* 31.5*     CMP    Recent Labs  Lab 03/02/18  0627 03/03/18  0209 03/03/18  1200    143 145   K 4.0 3.2* 4.1   CO2 34* 28 33*    105 105   BUN 14 19 17   CREATININE 0.9 1.0 1.1   GLU 88 103 115*       Recent Labs  Lab 03/02/18  0627 03/03/18  0209 03/03/18  1200   CALCIUM 9.4 9.1 9.6   MG 2.0 1.9 2.1   PHOS 3.2 2.4*  --        Recent Labs  Lab 03/01/18  1655 03/02/18  0627 03/03/18  0209   PROT 7.2 7.3 5.7*   ALBUMIN 3.1* 3.8 2.5*   BILITOT 1.1* 0.7 0.6   AST 18 30 14   ALKPHOS 92 55 73   ALT 9* 32 8*     CE    Recent Labs  Lab 03/01/18  1655 03/01/18  2350 03/02/18  0627   TROPONINI 0.043* 0.054* 0.038*     BNP    Recent Labs  Lab 03/01/18  1655   *       LAST HbA1c  Lab Results   Component Value Date    HGBA1C 4.8 03/01/2018       Results for MYNOR FIGUEROA (MRN [id number]) as of 3/3/2018 07:46   Ref. Range 3/2/2018 21:02 3/3/2018 02:09 3/3/2018 04:24   Lactate, Tom Latest Ref Range: 0.5 - 2.2 mmol/L 3.0 (H) 1.8 2.0     Imaging  - no new images    Assessment/Plan: 89 y.o. male with PMHx of CHF with preserved EF, HTN, and DM sent to ED from home for significant weight loss and FTT and found to have JEREMY.  With CT renal findings of constipation and 1.1 pulm nodule.     FTT  - encourage increased diet and boost   - daily wts  116 lbs today  - pt/ot  Q2 hr turn patient  - LA normalized to 2.0 yesterday      Pulmonary nodule  - 1.1 cm nodule seen on CT suggestive of neoplasm  - pulm consulted with recs       HTN and hx of afib  - cardiac monitor   - bp stable  - lopressor held overnight 2/2 bradycardia with HR to 30's yesterday and will decrease from 50mg BID to 25mg BID today.   - cw home amlopine  - will consider Cardiology consult    JEREMY  -  BUN/Cr of 26/1.5 on admission  - baseline Cr of 1.0   - yesterday AM: 19/ 1  today labs pending  - LR @ 50cc/hr    Constipation  - CT renal study showed stool in colon  s/p enema  - will continue stool softeners      HFpEF  - 7/10/17; echo EF 55 dd  - will gently hydrate and monitor for volume overload      Social: pt lives at home with son and daughter in-law  Pt/ot: ordered and following. Did not work with patient yesterday as pt declined.   Code: partial code DNI  Diet: regular diet plus boost   Ppx: scd, pantoprozole  Dispo: Follow up goals of care, monitor diet    Case d/w staff.     Veronica Smith D.O.  \[facility]\"" Family Medicine HO-2  03/04/2018

## 2018-03-04 NOTE — PT/OT/SLP PROGRESS
"Occupational Therapy      Patient Name:  Raghav Greer   MRN:  [id number]    [id number]:  Patient not seen today secondary to adamant refusal.  Communicated with nursing prior to attempted OT evaluation/treatment.  Correctly stated name & ; required reorientation to month/year & location.  Pt. Able to move LUE; expressed that his RUE "is the one that gives all the trouble."  When asked to move RUE Pt. Became agitated.  Stated, "I appreciate you, but everyone keeps coming in here."  Will follow-up tomorrow.    Pina Fernandez, OT  3/3/2018  "

## 2018-03-04 NOTE — PLAN OF CARE
"Problem: Patient Care Overview  Goal: Plan of Care Review  Outcome: Ongoing (interventions implemented as appropriate)  Plan of care reviewed with patient. Patient verbalized complete understanding. Fall/safety precautions maintained. SCDs on, Slip resistant socks on. Bed in lowest position, locked, call light within reach. Bed alarm on, Side rails up x's 2. Nurse instructed patient to notify staff for any assistance and pt verbalized complete understanding. Pt agitated and uncooperative during shift stating "don't touch me, I want to sleep, leave me alone." Pt turned Q2h to prevent skin breakdown, Foam dressing to sacrum CDI. Pt on continuous LR running at 50 ml/hr. Lactate trends reviewed. Pt sleeping comfortably throughout shift. No acute distress noted, will continue to monitor. Pt on telemetry, no ectopy or true red alarms noted. SA, HR 60-70's       "

## 2018-03-04 NOTE — PT/OT/SLP PROGRESS
"Occupational Therapy  Missed Eval    Patient Name:  Raghav Greer   MRN:  [id number]    Patient not seen today secondary to multiple verbal refusals w/ pt stating, "I want to be left alone!"; "I can do everything I need to do for myself, so all I need now is for you all to let me rest.".  This is the pt's 3rd consecutive refusal for OT svcs in 3 days, therefore OT eval & tx orders to be d/c'ed this date.    NEGRITA Oconnell  3/4/2018  "

## 2018-03-04 NOTE — PLAN OF CARE
Problem: Patient Care Overview  Goal: Plan of Care Review  Outcome: Ongoing (interventions implemented as appropriate)  Reviewed plan of care with pt. Fluids d/c today. Pt turned q2h. Pt denies any pain. No red alarms on tele. Pt in A. Fib on monitor with HR 70s - 90s. Safety measures are in place, bed low and in lock position, call light in reach, and bed alarm is on. Pt verbalizes full understanding of their plan of care.

## 2018-03-05 VITALS
DIASTOLIC BLOOD PRESSURE: 56 MMHG | SYSTOLIC BLOOD PRESSURE: 109 MMHG | OXYGEN SATURATION: 95 % | HEIGHT: 67 IN | BODY MASS INDEX: 18.35 KG/M2 | HEART RATE: 80 BPM | WEIGHT: 116.88 LBS | RESPIRATION RATE: 16 BRPM | TEMPERATURE: 98 F

## 2018-03-05 LAB
ALBUMIN SERPL BCP-MCNC: 2.4 G/DL
ALP SERPL-CCNC: 74 U/L
ALT SERPL W/O P-5'-P-CCNC: 8 U/L
ANION GAP SERPL CALC-SCNC: 5 MMOL/L
AST SERPL-CCNC: 13 U/L
BASOPHILS # BLD AUTO: 0.01 K/UL
BASOPHILS NFR BLD: 0.2 %
BILIRUB SERPL-MCNC: 0.5 MG/DL
BUN SERPL-MCNC: 19 MG/DL
CALCIUM SERPL-MCNC: 9.1 MG/DL
CHLORIDE SERPL-SCNC: 107 MMOL/L
CO2 SERPL-SCNC: 31 MMOL/L
CREAT SERPL-MCNC: 1.1 MG/DL
DIFFERENTIAL METHOD: ABNORMAL
EOSINOPHIL # BLD AUTO: 0.1 K/UL
EOSINOPHIL NFR BLD: 2.1 %
ERYTHROCYTE [DISTWIDTH] IN BLOOD BY AUTOMATED COUNT: 14.3 %
EST. GFR  (AFRICAN AMERICAN): >60 ML/MIN/1.73 M^2
EST. GFR  (NON AFRICAN AMERICAN): 58 ML/MIN/1.73 M^2
GLUCOSE SERPL-MCNC: 87 MG/DL
HCT VFR BLD AUTO: 31.7 %
HGB BLD-MCNC: 10.6 G/DL
LYMPHOCYTES # BLD AUTO: 1.4 K/UL
LYMPHOCYTES NFR BLD: 29.6 %
MAGNESIUM SERPL-MCNC: 1.8 MG/DL
MCH RBC QN AUTO: 32.9 PG
MCHC RBC AUTO-ENTMCNC: 33.4 G/DL
MCV RBC AUTO: 98 FL
MONOCYTES # BLD AUTO: 0.5 K/UL
MONOCYTES NFR BLD: 10 %
NEUTROPHILS # BLD AUTO: 2.7 K/UL
NEUTROPHILS NFR BLD: 57.9 %
PHOSPHATE SERPL-MCNC: 2.4 MG/DL
PLATELET # BLD AUTO: 120 K/UL
PMV BLD AUTO: 11.1 FL
POTASSIUM SERPL-SCNC: 3.5 MMOL/L
PROT SERPL-MCNC: 5.7 G/DL
RBC # BLD AUTO: 3.22 M/UL
SODIUM SERPL-SCNC: 143 MMOL/L
WBC # BLD AUTO: 4.7 K/UL

## 2018-03-05 PROCEDURE — 94761 N-INVAS EAR/PLS OXIMETRY MLT: CPT

## 2018-03-05 PROCEDURE — 25000003 PHARM REV CODE 250: Performed by: STUDENT IN AN ORGANIZED HEALTH CARE EDUCATION/TRAINING PROGRAM

## 2018-03-05 PROCEDURE — 80053 COMPREHEN METABOLIC PANEL: CPT

## 2018-03-05 PROCEDURE — 63600175 PHARM REV CODE 636 W HCPCS: Performed by: STUDENT IN AN ORGANIZED HEALTH CARE EDUCATION/TRAINING PROGRAM

## 2018-03-05 PROCEDURE — 85025 COMPLETE CBC W/AUTO DIFF WBC: CPT

## 2018-03-05 PROCEDURE — 84100 ASSAY OF PHOSPHORUS: CPT

## 2018-03-05 PROCEDURE — 83735 ASSAY OF MAGNESIUM: CPT

## 2018-03-05 RX ORDER — METOPROLOL SUCCINATE 25 MG/1
25 TABLET, EXTENDED RELEASE ORAL DAILY
Qty: 30 TABLET | Refills: 11 | Status: ON HOLD | OUTPATIENT
Start: 2018-03-05 | End: 2018-05-10 | Stop reason: HOSPADM

## 2018-03-05 RX ORDER — METOPROLOL TARTRATE 25 MG/1
25 TABLET, FILM COATED ORAL 2 TIMES DAILY
Qty: 60 TABLET | Refills: 11 | Status: ON HOLD | OUTPATIENT
Start: 2018-03-05 | End: 2018-05-10 | Stop reason: HOSPADM

## 2018-03-05 RX ADMIN — PANTOPRAZOLE SODIUM 40 MG: 40 TABLET, DELAYED RELEASE ORAL at 08:03

## 2018-03-05 RX ADMIN — HEPARIN SODIUM 5000 UNITS: 5000 INJECTION, SOLUTION INTRAVENOUS; SUBCUTANEOUS at 05:03

## 2018-03-05 RX ADMIN — AMLODIPINE BESYLATE 10 MG: 5 TABLET ORAL at 08:03

## 2018-03-05 RX ADMIN — METOPROLOL TARTRATE 25 MG: 25 TABLET ORAL at 08:03

## 2018-03-05 RX ADMIN — HEPARIN SODIUM 5000 UNITS: 5000 INJECTION, SOLUTION INTRAVENOUS; SUBCUTANEOUS at 03:03

## 2018-03-05 NOTE — NURSING
Notified Dr. Angel of pt low BP of 104/54, as well as, HR in 52-60's. MD orders to hold lopressor dose, MD will review chart. No new orders, will continue to monitor.

## 2018-03-05 NOTE — DISCHARGE SUMMARY
.[facility]  Discharge Summary     Patient ID:  Raghav Greer  [id number]  89 y.o.  5/6/1925    Admit date: 3/1/2018    Discharge Date and Time:  03/06/2018 2:31 PM    Admitting Physician: Marc Gonzalez III, MD     Discharge Provider: Wallace Phillips    Reason for Admission: Fatigue [R53.83]  Elevated troponin [R74.8]  Failure to thrive in adult [R62.7]  Failure to thrive (0-17) [R62.51]  Elevated lactic acid level [R79.89]  Acute kidney failure [N17.9]    Admission Condition: stable    Procedures Performed: * No surgery found *     HPI: 89 y.o. male with PMHx of CHF with preserved EF, HTN, and DM who was sent to the ED by his home nurse for evaluation for failure to thrive. His family states that he has unintentionally lost about 20 lbs over the past year, and has been experiencing decreased appetite, decreased PO intake, and dehydration. Deny  any fever, N/V/D, chills, chests pain, dysuria, hematuria, or SOB. Patient also complaining of lower abdominal pain that family believes is due to lack of BM for several days.     Hospital Course:  Raghav Greer 89 y.o. male admitted to the hospital for evaluation and management of JEREMY. In ED, patient was found to have BUN/Cr of 26/1.5 increased from baseline Cr of 1.0. Likely due to dehydration. Patient given 500cc boluses due to history of diastolic CHF. BUN/Cr repsonded appropriately to hydration and trended downward. In regard to failure to thrive, patient encouraged to increase diet and provided supplemental nutrition with BOOST shakes. Patient with history of afib on lopressor/amlodipine. Patient with episodes of bradycardia overnight on 3/3/2018 home lopressor decreased from 50mg BID to 25mg BID. Home amlodipine continued. Cardiology consulted regarding bradycardia. Cardiology recommended continuing low dose beta blockers only, and is not a candidate for OAC. Incidental RML nodule found on CT. Pulmonology consulted, but given patient's advanced age is  not a candidate for chemo/resection. Patient seen by PT who stated patient was not a skilled PT candidate and to continue HH/palliative. Patient refused to work with OT for 3 consecutive visits-orders were discontinued. Patient was stable at discharge.     Consults: Cardiology and Pulmonary/Intensive care    Significant Diagnostic Studies: radiology: CT scan: CT renal stone study with 1.1cm nodular opacity in RML, significant stool burden, and a hiatal hernia.     Final Diagnoses:    Principal Problem: JEREMY (acute kidney injury)   Secondary Diagnoses:   Active Hospital Problems    Diagnosis  POA    *JEREMY (acute kidney injury) [N17.9]  Unknown    Elevated lactic acid level [R79.89]  Unknown    Fatigue [R53.83]  Unknown    Dehydration [E86.0]  Unknown    Acute kidney failure [N17.9]  Yes    Failure to thrive in adult [R62.7]  Yes    Hyperlipemia [E78.5]  Yes    Elevated troponin [R74.8]  Yes    Essential hypertension [I10]  Yes    Advanced dementia [F03.90]  Yes    Chronic diastolic congestive heart failure [I50.32]  Yes     2D Echo 7/10/2017:  CONCLUSIONS     1 - Severe left atrial enlargement.     2 - Concentric remodeling.     3 - No wall motion abnormalities.     4 - Normal left ventricular systolic function (EF 55-60%).     5 - Impaired LV relaxation, elevated LAP (grade 2 diastolic dysfunction).     6 - Normal right ventricular systolic function .     7 - Pulmonary hypertension. The estimated PA systolic pressure is 43 mmHg.     8 - Mild tricuspid regurgitation.         Resolved Hospital Problems    Diagnosis Date Resolved POA   No resolved problems to display.       Discharged Condition: stable     Vitals   Temp: 97.2-98.7  Pulse: 52-82  Resp: 16-19  BP: systolic 104-145  BP: diastolic 54-70  MAP (mmHg) 74-98  SpO2: %    Discharge Exam:  Physical Exam:   General: awake and oriented to person, place and time, nad  HEENT: temporal wasting, atraoumatic, perrla   CV: RRR.No M/R/G.   Chest: NL effort.  CTAB. No R/R/W.   Abd: +BS x 4. Soft. ND, mildly tender   Ext: moving well.   Skin: Intact. No rash. No lesions.   Neuro: No focal deficit.    Disposition: [place]-Health Care [facility]    Follow Up/Patient Instructions:     Medications:  Reconciled Home Medications:   Discharge Medication List as of 3/5/2018  5:51 PM      START taking these medications    Details   metoprolol succinate (TOPROL-XL) 25 MG 24 hr tablet Take 1 tablet (25 mg total) by mouth once daily., Starting Mon 3/5/2018, Until Tue 3/5/2019, Normal         CONTINUE these medications which have CHANGED    Details   !! metoprolol tartrate (LOPRESSOR) 25 MG tablet Take 1 tablet (25 mg total) by mouth 2 (two) times daily., Starting Mon 3/5/2018, Until Tue 3/5/2019, Normal      !! metoprolol tartrate (LOPRESSOR) 25 MG tablet Take 1 tablet (25 mg total) by mouth 2 (two) times daily., Starting Mon 3/5/2018, Until Tue 3/5/2019, Normal      !! metoprolol tartrate (LOPRESSOR) 25 MG tablet Take 1 tablet (25 mg total) by mouth 2 (two) times daily., Starting Mon 3/5/2018, Until Tue 3/5/2019, Print       !! - Potential duplicate medications found. Please discuss with provider.      CONTINUE these medications which have NOT CHANGED    Details   amLODIPine (NORVASC) 10 MG tablet Take 10 mg by mouth once daily., Historical Med      brimonidine 0.2% (ALPHAGAN) 0.2 % Drop 1 drop every 8 (eight) hours., Historical Med      finasteride (PROSCAR) 5 mg tablet Take 5 mg by mouth once daily., Historical Med      omeprazole (PRILOSEC) 20 MG capsule Take 20 mg by mouth once daily., Historical Med      polyvinyl alcohol, artificial tears, (LIQUIFILM TEARS) 1.4 % ophthalmic solution 1 drop as needed., Historical Med      terazosin (HYTRIN) 5 MG capsule Take 5 mg by mouth every evening., Historical Med      travoprost, benzalkonium, (TRAVATAN) 0.004 % ophthalmic solution 1 drop every evening., Historical Med      acetaminophen (TYLENOL) 325 MG tablet Take 325 mg by mouth every 6 (six)  hours as needed for Pain., Historical Med      docusate sodium (COLACE) 100 MG capsule Take 100 mg by mouth once daily at 6am., Historical Med      furosemide (LASIX) 40 MG tablet Take 1 tablet (40 mg total) by mouth daily as needed (cough with copious white mucous)., Starting Mon 7/10/2017, Until Tue 7/10/2018, Normal      hydrocodone-acetaminophen 5-325mg (NORCO) 5-325 mg per tablet Take 1 tablet by mouth every 8 (eight) hours as needed for Pain., Historical Med      mirtazapine (REMERON) 15 MG tablet Take 15 mg by mouth every evening., Historical Med      ranitidine (ZANTAC) 150 MG tablet Take 150 mg by mouth 2 (two) times daily., Historical Med      sertraline (ZOLOFT) 50 MG tablet Take 25 mg by mouth every evening. , Historical Med         STOP taking these medications       simvastatin (ZOCOR) 80 MG tablet Comments:   Reason for Stopping:         pravastatin (PRAVACHOL) 80 MG tablet Comments:   Reason for Stopping:               Discharge Procedure Orders  Ambulatory referral to Home Health   Referral Priority: Routine Referral Type: Home Health   Referral Reason: Specialty Services Required    Requested Specialty: Home Health Services    Number of Visits Requested: 1      Diet Adult Regular     Activity as tolerated     Notify your health care provider if you experience any of the following:  temperature >100.4     Notify your health care provider if you experience any of the following:  persistent nausea and vomiting or diarrhea     Notify your health care provider if you experience any of the following:  severe uncontrolled pain     Notify your health care provider if you experience any of the following:  redness, tenderness, or signs of infection (pain, swelling, redness, odor or green/yellow discharge around incision site)       Follow-up Information     [facility] ([facility]    Contact information:  This program will resume, [facility] Dr. Carlos muñoz to resume this.                Activity: ambulate in house with assistance  Diet: cardiac diet and renal diet  Wound Care: keep wound clean and dry and none needed    Greater than 30 minutes was spent on discharge planning with this patient    Signed:  Wallace Phillips  3/6/2018  2:31 PM

## 2018-03-05 NOTE — NURSING
Notified Dr. Martinez of patient sustaining a heart rate in the 120s. Patient resting in bed and asymptomatic. Will continue to monitor patient.

## 2018-03-05 NOTE — PROGRESS NOTES
PGY-2 Progress Note  [facility]  Follow up for:   Chief Complaint   Patient presents with    Failure To Thrive     failure to thrive for about 6 months.  Home Health Nurse sent him to ED for evaluation.  Lower abdominal pressure       Hospital Stay Day 3    Subjective: NAEON. Adequate uop, bm x 1. Patient seen and examined. No complaints today. Doing well. Denies any CP, SOB, N/V/D, headaches, fever or chills.       Scheduled Meds:   amLODIPine  10 mg Oral Daily    heparin (porcine)  5,000 Units Subcutaneous Q8H    metoprolol tartrate  25 mg Oral BID    mirtazapine  15 mg Oral QHS    pantoprazole  40 mg Oral Daily    sertraline  25 mg Oral QHS     Continuous Infusions:    PRN Meds:dextrose 50%, dextrose 50%, glucagon (human recombinant), glucose, glucose, magnesium hydroxide 400 mg/5 ml, ondansetron, sodium chloride 0.9%    Review of patient's allergies indicates:  No Known Allergies    Objectives:     Vitals(Most Recent)      BP  Min: 104/54  Max: 145/68  Temp  Av.9 °F (36.6 °C)  Min: 97.2 °F (36.2 °C)  Max: 98.7 °F (37.1 °C)  Pulse  Av.1  Min: 52  Max: 82  Resp  Av.4  Min: 16  Max: 19  SpO2  Av.7 %  Min: 95 %  Max: 99 %             Vitals([id number])  Temp:  [97.2 °F (36.2 °C)-98.7 °F (37.1 °C)]   Pulse:  [52-82]   Resp:  [16-19]   BP: (104-145)/(54-70)   SpO2:  [95 %-99 %]     I & O([id number])    Intake/Output Summary (Last 24 hours) at 18 1652  Last data filed at 18 1537   Gross per 24 hour   Intake              570 ml   Output              591 ml   Net              -21 ml       Physical Exam:   General: awake and oriented to person, place and time, nad  HEENT: temporal wasting, atraoumatic, perrla   CV: RRR.No M/R/G.   Chest: NL effort. CTAB. No R/R/W.   Abd: +BS x 4. Soft. ND, mildly tender   Ext: moving well.   Skin: Intact. No rash. No lesions.   Neuro: No focal deficit.    LABS  CBC    Recent Labs  Lab 18  0209 18  0818 18  0659   WBC 5.97 5.26 4.70   RBC  3.31* 3.45* 3.22*   HGB 10.3* 10.7* 10.6*   HCT 32.5* 34.0* 31.7*   * 123* 120*   MCV 98 99* 98   MCH 31.1* 31.0 32.9*   MCHC 31.7* 31.5* 33.4     CMP    Recent Labs  Lab 03/03/18  1200 03/04/18  0818 03/05/18  0700    143 143   K 4.1 3.8 3.5   CO2 33* 28 31*    108 107   BUN 17 20 19   CREATININE 1.1 1.1 1.1   * 89 87       Recent Labs  Lab 03/03/18  0209 03/03/18  1200 03/04/18  0818 03/05/18  0700   CALCIUM 9.1 9.6 9.0 9.1   MG 1.9 2.1 1.9 1.8   PHOS 2.4*  --  2.5* 2.4*       Recent Labs  Lab 03/03/18  0209 03/04/18  0818 03/05/18  0700   PROT 5.7* 5.8* 5.7*   ALBUMIN 2.5* 2.4* 2.4*   BILITOT 0.6 0.5 0.5   AST 14 20 13   ALKPHOS 73 75 74   ALT 8* 7* 8*     CE    Recent Labs  Lab 03/01/18  1655 03/01/18  2350 03/02/18  0627   TROPONINI 0.043* 0.054* 0.038*     BNP    Recent Labs  Lab 03/01/18  1655   *       LAST HbA1c  Lab Results   Component Value Date    HGBA1C 4.8 03/01/2018       Results for MYNOR FIGUEROA (MRN [id number]) as of 3/3/2018 07:46   Ref. Range 3/2/2018 21:02 3/3/2018 02:09 3/3/2018 04:24   Lactate, Tom Latest Ref Range: 0.5 - 2.2 mmol/L 3.0 (H) 1.8 2.0     Imaging  - no new images    Assessment/Plan: 89 y.o. male with PMHx of CHF with preserved EF, HTN, and DM sent to ED from home for significant weight loss and FTT and found to have JEREMY.  With CT renal findings of constipation and 1.1 pulm nodule.     FTT  - encourage increased diet and boost   - daily wts  116 lbs today  - pt/ot  Q2 hr turn patient  - LA normalized      Pulmonary nodule  - 1.1 cm nodule seen on CT suggestive of neoplasm  - pulm consulted with recs       HTN and hx of afib  - cardiac monitor   - bp stable  - occasionally tachy to 120's   - cw home amlopine  - cards consulted: c/w with low dose bb and asa      JEREMY  - BUN/Cr of 26/1.5 on admission  - baseline Cr of 1.0   - today cr 1.1      Constipation  - CT renal study showed stool in colon  s/p enema  - will continue stool  softeners      HFpEF  - 7/10/17; echo EF 55 dd  - will gently hydrate and monitor for volume overload    Code: partial code DNI  Diet: regular diet plus boost   Ppx: scd, pantoprozole  Dispo: Follow up goals of care, monitor diet, likely discharge today    Case d/w staff.     Veronica Smith D.O.  [facility] HO-2  03/05/2018

## 2018-03-05 NOTE — DISCHARGE INSTRUCTIONS
Kidney Injury, Acute, Discharge Instructions for (English) View Edit Remove  Heart Failure, Congestive (CHF) (English) View Edit Remove  Metoprolol tablets (English) View Edit Remove

## 2018-03-05 NOTE — PT/OT/SLP PROGRESS
Physical Therapy Discharge      Patient Name:  Raghav Greer   MRN:  [id number]    Patient seen on 3/2/2018 for Initial Eval, patient dependent with bed mobility and determined not a skilled PT candidate.  Patient seen today alert confused and agitated. Patient not willing to attempt any therapy. Patient is currently receiving [facility]. Patient remains not a skilled PT candidate. Will DC PT service    Roderick Fuller, PT

## 2018-03-05 NOTE — PLAN OF CARE
Patient is pending clinical improvement, TN spoke with [facility] and they have given me information regarding his existing [facility] home-based care.  TN will submit DC Worksheet once FP has filled it out.     03/05/18 1430   Discharge Reassessment   Assessment Type Discharge Planning Reassessment   Discharge Plan A Home Health;Home with family

## 2018-03-05 NOTE — PLAN OF CARE
Patient has home-based care at this time (MD, NP and RN visits at home), they will call to let their NP Simone know they have been discharged so they can resume care. TN faxed DC worksheet and prescriptions to [phone].  TN went over the DC worksheet with CANDIS CHIN at [facility] to make sure it was filled out properly for resumption of [facility].    TN spoke with daughter in law who will come to pick the patient up for DC at this time.    Contact info at the [facility]:  Shayan [facility]IRINOS for [facility]  [phone]   Ext [phone]  Fax  [phone]  Has home-based primary care already  His PCP with [facility] is Dr. Simone Hartman  Follow-up With  Details  Why  Contact Info   [facility] ([facility])      This program will resume, the [facility] received orders from Dr. Gonzalez team to resume this.          03/05/18 1535   Final Note   Assessment Type Final Discharge Note   Discharge Disposition Home-Health   What phone number can be called within the next 1-3 days to see how you are doing after discharge? [phone]   Hospital Follow Up  Appt(s) scheduled? Yes  (patient has home-based care)   Discharge plans and expectations educations in teach back method with documentation complete? Yes   Right Care Referral Info   Post Acute Recommendation Home-care

## 2018-03-05 NOTE — PLAN OF CARE
Problem: Patient Care Overview  Goal: Plan of Care Review  Outcome: Ongoing (interventions implemented as appropriate)  Plan of care reviewed with patient. Patient verbalized complete understanding. Fall/safety precautions maintained. Slip resistant socks on. Bed in lowest position, locked, call light within reach. Bed alarm on, Side rails up x's 2. Nurse instructed patient to notify staff for any assistance and pt verbalized complete understanding. Pt turned Q2h to prevent skin breakdown, Foam dressing to sacrum CDI. Lactate trends reviewed. Pt sleeping comfortably throughout shift. Post void residual completed. No acute distress noted, will continue to monitor. Pt on telemetry, no ectopy or true red alarms noted. Afib, HR 60-70's

## 2018-03-06 NOTE — NURSING
Patient discharged in wheelchair by escort services with all patient belongings. IV removed, no bleeding noted. Tele monitor removed and returned. Discharge instructions and educational materials reviewed with patient and family. Patient and family verbalize clear understanding of materials. No acute distress upon discharge.

## 2018-03-08 NOTE — PHYSICIAN QUERY
PT Name: Raghav Greer  MR #: [id number]    Physician Query Form - Nutrition Clarification     CDS: Olivia Aaron RN CCDS                  Contact Information: [email or web address]    This form is a permanent document in the medical record.     Query Date: March 8, 2018    By submitting this query, we are merely seeking further clarification of documentation.. Please utilize your independent clinical judgment when addressing the question(s) below.    The Medical record contains the following:   Indicators  Supporting Clinical Findings Location in Medical Record    % of Estimated Energy Intake over a time frame from p.o., TF, or TPN     X Weight Status over a time frame Family concerned about unintentional weight loss and decreased appetite  Over the last year H&P    Subcutaneous Fat and/or Muscle Loss      Fluid Accumulation or Edema      Reduced  Strength     X Wt / BMI / Usual Body Weight BMI= 18.3  BMI= 20.8 Anthropometrics 3/1   X Delayed Wound Healing / Failure to Thrive Failure To Thrive for about 6 months H&P   X Acute or Chronic Illness JEREMY, dementia, failure to thrive H&P    Medication     X Treatment encourage increased diet and boost   PN 3/1   X Other His family states that he has unintentionally lost about 20 lbs over the past year.     Very thin male, cachectic appearing  ED note 3/1        H&P     AND / ASPEN Clinical Characteristics (October 2011)  A minimum of two characteristics is recommended for diagnosing either moderate or severe malnutrition   Mild Malnutrition Moderate Malnutrition Severe Malnutrition   Energy Intake from p.o., TF or TPN. < 75% intake of estimated energy needs for less than 7 days < 75% intake of estimated energy needs for greater than 7 days < 50% intake of estimated energy needs for > 5 days   Weight Loss 1-2% in 1 month  5% in 3 months  7.5% in 6 months  10% in 1 year 1-2 % in 1 week  5% in 1 month  7.5% in 3 months  10% in 6 months  20% in 1 year > 2% in 1 week  > 5% in 1  month  > 7.5% in 3 months  > 10% in 6 months  > 20% in 1 year   Physical Findings     None *Mild subcutaneous fat and/or muscle loss  *Mild fluid accumulation  *Stage II decubitus  *Surgical wound or non-healing wound *Mod/severe subcutaneous fat and/or muscle loss  *Mod/severe fluid accumulation  *Stage III or IV decubitus  *Non-healing surgical wound     Provider, please specify diagnosis or diagnoses associated with above clinical findings.    [ ] Mild Protein-Calorie Malnutrition  [ ] Moderate Protein-Calorie Malnutrition  [x ] Severe Protein-Calorie Malnutrition  [ ] Cachexia  [ ] Anorexia  [ ] Abnormal Weight Loss  [ ] Underweight  [ ] Other Nutritional Diagnosis (please specify): ____________________________________  [ ] Other: ________________________________  [ ] Clinically Undetermined    Please document in your progress notes daily for the duration of treatment until resolved and include in your discharge summary.

## 2018-05-06 ENCOUNTER — HOSPITAL ENCOUNTER (INPATIENT)
Facility: HOSPITAL | Age: 83
LOS: 4 days | Discharge: HOSPICE | DRG: 871 | End: 2018-05-10
Attending: EMERGENCY MEDICINE | Admitting: FAMILY MEDICINE
Payer: MEDICARE

## 2018-05-06 DIAGNOSIS — R79.89 ELEVATED LACTIC ACID LEVEL: ICD-10-CM

## 2018-05-06 DIAGNOSIS — R53.83 FATIGUE: ICD-10-CM

## 2018-05-06 DIAGNOSIS — R79.89 ELEVATED TROPONIN: ICD-10-CM

## 2018-05-06 DIAGNOSIS — Z71.89 COUNSELING REGARDING ADVANCED CARE PLANNING AND GOALS OF CARE: ICD-10-CM

## 2018-05-06 DIAGNOSIS — L97.524 CHRONIC ULCER OF LEFT FOOT WITH NECROSIS OF BONE: ICD-10-CM

## 2018-05-06 DIAGNOSIS — R53.83 FATIGUE, UNSPECIFIED TYPE: ICD-10-CM

## 2018-05-06 DIAGNOSIS — S91.309A OPEN WOUND OF FOOT: ICD-10-CM

## 2018-05-06 DIAGNOSIS — E87.20 LACTIC ACIDOSIS: ICD-10-CM

## 2018-05-06 DIAGNOSIS — F03.C0 ADVANCED DEMENTIA: Primary | ICD-10-CM

## 2018-05-06 DIAGNOSIS — Z51.5 PALLIATIVE CARE ENCOUNTER: ICD-10-CM

## 2018-05-06 DIAGNOSIS — E86.0 DEHYDRATION: ICD-10-CM

## 2018-05-06 DIAGNOSIS — D64.9 ANEMIA, UNSPECIFIED TYPE: ICD-10-CM

## 2018-05-06 DIAGNOSIS — I50.32 CHRONIC DIASTOLIC CONGESTIVE HEART FAILURE: ICD-10-CM

## 2018-05-06 DIAGNOSIS — R62.7 FAILURE TO THRIVE IN ADULT: ICD-10-CM

## 2018-05-06 DIAGNOSIS — M86.9 OSTEOMYELITIS OF LEFT FOOT, UNSPECIFIED TYPE: ICD-10-CM

## 2018-05-06 DIAGNOSIS — N17.9 AKI (ACUTE KIDNEY INJURY): ICD-10-CM

## 2018-05-06 DIAGNOSIS — M86.9 OSTEOMYELITIS OF LEFT FOOT: ICD-10-CM

## 2018-05-06 LAB
ALBUMIN SERPL BCP-MCNC: 2.6 G/DL
ALP SERPL-CCNC: 64 U/L
ALT SERPL W/O P-5'-P-CCNC: 9 U/L
ANION GAP SERPL CALC-SCNC: 11 MMOL/L
AST SERPL-CCNC: 11 U/L
BASOPHILS # BLD AUTO: 0 K/UL
BASOPHILS NFR BLD: 0 %
BILIRUB SERPL-MCNC: 0.7 MG/DL
BILIRUB UR QL STRIP: NEGATIVE
BNP SERPL-MCNC: 109 PG/ML
BUN SERPL-MCNC: 49 MG/DL
CALCIUM SERPL-MCNC: 10.3 MG/DL
CHLORIDE SERPL-SCNC: 103 MMOL/L
CLARITY UR: CLEAR
CO2 SERPL-SCNC: 28 MMOL/L
COLOR UR: YELLOW
CREAT SERPL-MCNC: 1.5 MG/DL
CRP SERPL-MCNC: 8.03 MG/L
DIFFERENTIAL METHOD: ABNORMAL
EOSINOPHIL # BLD AUTO: 0 K/UL
EOSINOPHIL NFR BLD: 0 %
ERYTHROCYTE [DISTWIDTH] IN BLOOD BY AUTOMATED COUNT: 16.6 %
ERYTHROCYTE [SEDIMENTATION RATE] IN BLOOD BY WESTERGREN METHOD: 18 MM/HR
EST. GFR  (AFRICAN AMERICAN): 46 ML/MIN/1.73 M^2
EST. GFR  (NON AFRICAN AMERICAN): 40 ML/MIN/1.73 M^2
ESTIMATED AVG GLUCOSE: 97 MG/DL
GLUCOSE SERPL-MCNC: 126 MG/DL
GLUCOSE UR QL STRIP: NEGATIVE
HBA1C MFR BLD HPLC: 5 %
HCT VFR BLD AUTO: 27 %
HGB BLD-MCNC: 8.2 G/DL
HGB UR QL STRIP: NEGATIVE
INR PPP: 1.6
KETONES UR QL STRIP: NEGATIVE
LACTATE SERPL-SCNC: 6.2 MMOL/L
LACTATE SERPL-SCNC: 8.5 MMOL/L
LEUKOCYTE ESTERASE UR QL STRIP: NEGATIVE
LYMPHOCYTES # BLD AUTO: 0.5 K/UL
LYMPHOCYTES NFR BLD: 6.8 %
MAGNESIUM SERPL-MCNC: 2.1 MG/DL
MCH RBC QN AUTO: 31.1 PG
MCHC RBC AUTO-ENTMCNC: 30.4 G/DL
MCV RBC AUTO: 102 FL
MONOCYTES # BLD AUTO: 0.3 K/UL
MONOCYTES NFR BLD: 4.7 %
NEUTROPHILS # BLD AUTO: 6.2 K/UL
NEUTROPHILS NFR BLD: 88.4 %
NITRITE UR QL STRIP: NEGATIVE
PH UR STRIP: 5 [PH] (ref 5–8)
PHOSPHATE SERPL-MCNC: 3.3 MG/DL
PLATELET # BLD AUTO: 111 K/UL
PMV BLD AUTO: 11.3 FL
POTASSIUM SERPL-SCNC: 4.5 MMOL/L
PROCALCITONIN SERPL IA-MCNC: 0.16 NG/ML
PROT SERPL-MCNC: 5.4 G/DL
PROT UR QL STRIP: ABNORMAL
PROTHROMBIN TIME: 16.9 SEC
RBC # BLD AUTO: 2.64 M/UL
SODIUM SERPL-SCNC: 142 MMOL/L
SP GR UR STRIP: 1.02 (ref 1–1.03)
TROPONIN I SERPL DL<=0.01 NG/ML-MCNC: 0.06 NG/ML
URN SPEC COLLECT METH UR: ABNORMAL
UROBILINOGEN UR STRIP-ACNC: NEGATIVE EU/DL
WBC # BLD AUTO: 7.02 K/UL

## 2018-05-06 PROCEDURE — 81003 URINALYSIS AUTO W/O SCOPE: CPT

## 2018-05-06 PROCEDURE — 93005 ELECTROCARDIOGRAM TRACING: CPT

## 2018-05-06 PROCEDURE — 25000003 PHARM REV CODE 250: Performed by: EMERGENCY MEDICINE

## 2018-05-06 PROCEDURE — 87086 URINE CULTURE/COLONY COUNT: CPT

## 2018-05-06 PROCEDURE — 25000003 PHARM REV CODE 250: Performed by: FAMILY MEDICINE

## 2018-05-06 PROCEDURE — 96365 THER/PROPH/DIAG IV INF INIT: CPT

## 2018-05-06 PROCEDURE — 84145 PROCALCITONIN (PCT): CPT

## 2018-05-06 PROCEDURE — 83036 HEMOGLOBIN GLYCOSYLATED A1C: CPT

## 2018-05-06 PROCEDURE — 84484 ASSAY OF TROPONIN QUANT: CPT

## 2018-05-06 PROCEDURE — 63600175 PHARM REV CODE 636 W HCPCS: Performed by: EMERGENCY MEDICINE

## 2018-05-06 PROCEDURE — 83735 ASSAY OF MAGNESIUM: CPT

## 2018-05-06 PROCEDURE — 80053 COMPREHEN METABOLIC PANEL: CPT

## 2018-05-06 PROCEDURE — 83605 ASSAY OF LACTIC ACID: CPT

## 2018-05-06 PROCEDURE — 85652 RBC SED RATE AUTOMATED: CPT

## 2018-05-06 PROCEDURE — 85025 COMPLETE CBC W/AUTO DIFF WBC: CPT

## 2018-05-06 PROCEDURE — 84100 ASSAY OF PHOSPHORUS: CPT

## 2018-05-06 PROCEDURE — 96361 HYDRATE IV INFUSION ADD-ON: CPT

## 2018-05-06 PROCEDURE — 99285 EMERGENCY DEPT VISIT HI MDM: CPT | Mod: 25

## 2018-05-06 PROCEDURE — 20000000 HC ICU ROOM

## 2018-05-06 PROCEDURE — 87040 BLOOD CULTURE FOR BACTERIA: CPT

## 2018-05-06 PROCEDURE — 86141 C-REACTIVE PROTEIN HS: CPT

## 2018-05-06 PROCEDURE — 85610 PROTHROMBIN TIME: CPT

## 2018-05-06 PROCEDURE — 36415 COLL VENOUS BLD VENIPUNCTURE: CPT

## 2018-05-06 PROCEDURE — 63600175 PHARM REV CODE 636 W HCPCS: Performed by: FAMILY MEDICINE

## 2018-05-06 PROCEDURE — 83880 ASSAY OF NATRIURETIC PEPTIDE: CPT

## 2018-05-06 RX ORDER — SODIUM CHLORIDE 450 MG/100ML
INJECTION, SOLUTION INTRAVENOUS CONTINUOUS
Status: DISCONTINUED | OUTPATIENT
Start: 2018-05-07 | End: 2018-05-07

## 2018-05-06 RX ORDER — IBUPROFEN 200 MG
16 TABLET ORAL
Status: DISCONTINUED | OUTPATIENT
Start: 2018-05-06 | End: 2018-05-10

## 2018-05-06 RX ORDER — ONDANSETRON 2 MG/ML
4 INJECTION INTRAMUSCULAR; INTRAVENOUS EVERY 6 HOURS PRN
Status: DISCONTINUED | OUTPATIENT
Start: 2018-05-06 | End: 2018-05-11 | Stop reason: HOSPADM

## 2018-05-06 RX ORDER — GLUCAGON 1 MG
1 KIT INJECTION
Status: DISCONTINUED | OUTPATIENT
Start: 2018-05-06 | End: 2018-05-10

## 2018-05-06 RX ORDER — IBUPROFEN 200 MG
24 TABLET ORAL
Status: DISCONTINUED | OUTPATIENT
Start: 2018-05-06 | End: 2018-05-10

## 2018-05-06 RX ORDER — ASPIRIN 325 MG
325 TABLET ORAL DAILY
Status: DISCONTINUED | OUTPATIENT
Start: 2018-05-07 | End: 2018-05-10

## 2018-05-06 RX ORDER — HEPARIN SODIUM 5000 [USP'U]/ML
5000 INJECTION, SOLUTION INTRAVENOUS; SUBCUTANEOUS EVERY 8 HOURS
Status: DISCONTINUED | OUTPATIENT
Start: 2018-05-06 | End: 2018-05-10

## 2018-05-06 RX ORDER — SODIUM CHLORIDE 9 MG/ML
500 INJECTION, SOLUTION INTRAVENOUS
Status: COMPLETED | OUTPATIENT
Start: 2018-05-06 | End: 2018-05-06

## 2018-05-06 RX ORDER — SODIUM CHLORIDE 0.9 % (FLUSH) 0.9 %
5 SYRINGE (ML) INJECTION
Status: DISCONTINUED | OUTPATIENT
Start: 2018-05-06 | End: 2018-05-10

## 2018-05-06 RX ORDER — CEFEPIME HYDROCHLORIDE 2 G/1
2 INJECTION, POWDER, FOR SOLUTION INTRAVENOUS
Status: DISCONTINUED | OUTPATIENT
Start: 2018-05-06 | End: 2018-05-07

## 2018-05-06 RX ORDER — INSULIN ASPART 100 [IU]/ML
0-5 INJECTION, SOLUTION INTRAVENOUS; SUBCUTANEOUS
Status: DISCONTINUED | OUTPATIENT
Start: 2018-05-06 | End: 2018-05-10

## 2018-05-06 RX ORDER — DEXTROSE MONOHYDRATE, SODIUM CHLORIDE, AND POTASSIUM CHLORIDE 50; 1.49; 4.5 G/1000ML; G/1000ML; G/1000ML
INJECTION, SOLUTION INTRAVENOUS CONTINUOUS
Status: DISCONTINUED | OUTPATIENT
Start: 2018-05-06 | End: 2018-05-06

## 2018-05-06 RX ADMIN — DEXTROSE MONOHYDRATE, SODIUM CHLORIDE, AND POTASSIUM CHLORIDE: 50; 4.5; 1.49 INJECTION, SOLUTION INTRAVENOUS at 09:05

## 2018-05-06 RX ADMIN — VANCOMYCIN HYDROCHLORIDE 1000 MG: 1 INJECTION, POWDER, LYOPHILIZED, FOR SOLUTION INTRAVENOUS at 05:05

## 2018-05-06 RX ADMIN — HEPARIN SODIUM 5000 UNITS: 5000 INJECTION, SOLUTION INTRAVENOUS; SUBCUTANEOUS at 09:05

## 2018-05-06 RX ADMIN — SODIUM CHLORIDE 500 ML: 9 INJECTION, SOLUTION INTRAVENOUS at 06:05

## 2018-05-06 RX ADMIN — SODIUM CHLORIDE 1000 ML: 0.9 INJECTION, SOLUTION INTRAVENOUS at 02:05

## 2018-05-06 RX ADMIN — SODIUM CHLORIDE: 0.45 INJECTION, SOLUTION INTRAVENOUS at 11:05

## 2018-05-06 RX ADMIN — CEFEPIME 2 G: 2 INJECTION, POWDER, FOR SOLUTION INTRAMUSCULAR; INTRAVENOUS at 09:05

## 2018-05-06 RX ADMIN — BIMATOPROST 1 DROP: 0.1 SOLUTION/ DROPS OPHTHALMIC at 11:05

## 2018-05-06 NOTE — CONSULTS
[facility] Consult Note    Reason for Consult:  Left foot infection    HPI: 89M with multiple medical problems presents with altered mental status.  I spoke with his daughter-in-law on the phone to obtain a history.  He has a chronic left foot wound for which he was [facility].  Over the last 2 months, he has been transitioned to PO doxy and cipro.  His family states he has been told he is not a candidate for amputation due to an arterial issue in his left leg.  Per his family, he was more lethargic today and not acting at his baseline.  His eyes rolled back into his head at one point.  It is unclear if he has a DNR or an advanced directive with specific wishes, but the family is going to bring the paperwork.       PMH:   Past Medical History:   Diagnosis Date    CHF (congestive heart failure)     Diabetes mellitus     Hypertension        PSH: History reviewed. No pertinent surgical history.    Med:   No current facility-administered medications on file prior to encounter.      Current Outpatient Prescriptions on File Prior to Encounter   Medication Sig Dispense Refill    acetaminophen (TYLENOL) 325 MG tablet Take 325 mg by mouth every 6 (six) hours as needed for Pain.      amLODIPine (NORVASC) 10 MG tablet Take 10 mg by mouth once daily.      brimonidine 0.2% (ALPHAGAN) 0.2 % Drop 1 drop every 8 (eight) hours.      docusate sodium (COLACE) 100 MG capsule Take 100 mg by mouth once daily at 6am.      finasteride (PROSCAR) 5 mg tablet Take 5 mg by mouth once daily.      furosemide (LASIX) 40 MG tablet Take 1 tablet (40 mg total) by mouth daily as needed (cough with copious white mucous). 30 tablet 0    hydrocodone-acetaminophen 5-325mg (NORCO) 5-325 mg per tablet Take 1 tablet by mouth every 8 (eight) hours as needed for Pain.      metoprolol succinate (TOPROL-XL) 25 MG 24 hr tablet Take 1 tablet (25 mg total) by mouth once daily. 30 tablet  11    metoprolol tartrate (LOPRESSOR) 25 MG tablet Take 1 tablet (25 mg total) by mouth 2 (two) times daily. 60 tablet 11    metoprolol tartrate (LOPRESSOR) 25 MG tablet Take 1 tablet (25 mg total) by mouth 2 (two) times daily. 60 tablet 11    metoprolol tartrate (LOPRESSOR) 25 MG tablet Take 1 tablet (25 mg total) by mouth 2 (two) times daily. 60 tablet 11    mirtazapine (REMERON) 15 MG tablet Take 15 mg by mouth every evening.      omeprazole (PRILOSEC) 20 MG capsule Take 20 mg by mouth once daily.      polyvinyl alcohol, artificial tears, (LIQUIFILM TEARS) 1.4 % ophthalmic solution 1 drop as needed.      ranitidine (ZANTAC) 150 MG tablet Take 150 mg by mouth 2 (two) times daily.      sertraline (ZOLOFT) 50 MG tablet Take 25 mg by mouth every evening.       terazosin (HYTRIN) 5 MG capsule Take 5 mg by mouth every evening.      travoprost, benzalkonium, (TRAVATAN) 0.004 % ophthalmic solution 1 drop every evening.         Allergies: Review of patient's allergies indicates:  No Known Allergies    SH:   Social History     Social History    Marital status:      Spouse name: N/A    Number of children: N/A    Years of education: N/A     Occupational History    Not on file.     Social History Main Topics    Smoking status: Unknown If Ever Smoked    Smokeless tobacco: Not on file      Comment: Pt unable to answer questions.    Alcohol use No      Comment: Pt unable to answer questions.    Drug use: Unknown      Comment: Pt unable to answer questions.    Sexual activity: Not on file      Comment: Pt unable to answer questions.     Other Topics Concern    Not on file     Social History Narrative    No narrative on file         ROS: otherwise negative except indicated in HPI     Vitals:    05/06/18 1701   BP: 112/66   Pulse: (!) 130   Resp: (!) 21     NAD, lethargic, follows commands  HEENT: atraumatic, normocephalic, poor dentition  CV: RRR by PP  Pulm: Normal work of breathing    Musculoskeletal:  LLE  2x2cm ulcer with exposed metatarsal head along the lateral aspect of the forefoot.   Wound edges appear clean.  Bone appears necrotic.  No purulence.  No pain with 5th to passive motion.    Nods his head to sensation testing in SP/SP/[place]/Sa/T  Motor intact EHL/FHL/GS/TA  2+ DP      Labs: WBC 7.02  H&H 8.2/27.0  INR 1.6  Lactate 8.5     Imaging:Xrays of the left foot show erosion of his left 5th TP joint with overlying ulcer        A/P:   89M with sepsis from left foot osteomyelitis  - Blood cultures pending, but due to exposed bone, his osteomyelitis is likely polymicrobial.  A bone biopsy would be more specific to identify his organism(s) than a surface swab which will likely return skin alexia.  It is unclear whether his family wishes include surgical management of this wound, or whether he is healthy enough for surgery.  If so, he may require an MRI to determine the extent of his disease to determine the level or debridement and/or amputation.  This procedure, if pursued, may be better suited for general surgery or podiatry.  For now, recommend IV abx per primary and will discuss options with family, staff and other consultants.    - Notes reviewed from [facility] vascular surgery - pt has L iliac occlusion with distal runoff.  Would likely need bypass along with amputation to control wound.  Per cardiology - high risk for high risk procedure  - The infection crosses the metatarsal phalangeal joint which by definition qualifies as a septic arthritis, but this is not the same as a septic arthritis of a healthy joint that is a surgical urgency to prevent cartilage damage.  This is a chronic erosion.      -Thank you for allowing [facility] to participate in this patient's care

## 2018-05-06 NOTE — ED PROVIDER NOTES
Encounter Date: 5/6/2018    SCRIBE #1 NOTE: I, Chidi Macario, am scribing for, and in the presence of,  Dr. Torres. I have scribed the entire note.       History     Chief Complaint   Patient presents with    Fatigue     family called stating patient had moment of weakness where patient was not responding very well, EMS reports he was hypotensive upon their arrival but improved en route. family states patient is a DNR     Time seen by provider: 2:07 PM    This is a 89 y.o. male who presents with altered mental state. Per his son the reason for coming in today was becuase he wasn't as responsive as he normally is.  The patient has not been eating or drinking normally.  Per the son he only drinks small amounts of juice, water and glucerna daily.  The patient has a [facility]  The family was told that he was not a surgical candidate.  For the past 2 months the [facility] and has no recent iv antibiotics. The last wound care he recieved was Friday.  The hx is obtained from pt son who is the only family member here.  The patient cannot give any additional hx.       The history is provided by a relative. The history is limited by the condition of the patient.     Review of patient's allergies indicates:  No Known Allergies  Past Medical History:   Diagnosis Date    CHF (congestive heart failure)     Diabetes mellitus     Hypertension      History reviewed. No pertinent surgical history.  History reviewed. No pertinent family history.  Social History   Substance Use Topics    Smoking status: Unknown If Ever Smoked    Smokeless tobacco: Not on file      Comment: Pt unable to answer questions.    Alcohol use No      Comment: Pt unable to answer questions.     Review of Systems   Unable to perform ROS: Mental status change       Physical Exam     Initial Vitals   BP Pulse Resp Temp SpO2   -- -- -- -- --      MAP       --         Physical  Exam    Nursing note and vitals reviewed.  Constitutional:   Cachectic elderly AAM   HENT:   Head: Normocephalic and atraumatic.   Dry, cracked MM   Eyes: Conjunctivae are normal. Pupils are equal, round, and reactive to light.   Neck: Normal range of motion.   Cardiovascular: Regular rhythm.   Tachycardia    Pulmonary/Chest: Breath sounds normal. No respiratory distress.   Abdominal: Soft. He exhibits no distension. There is no tenderness.   Musculoskeletal: He exhibits edema.   Open wound left lateral foot with bony exposure.  Weak but palpable DP and PT pulse noted.  Foot is cool to touch.    Neurological: He is alert.   Awake and alert.  Pt is oriented to person and place   Skin: Skin is warm.   Stage 3 ulcer on sacrum, Unstageable ulcer to the lateral left foot with bony exposure, Poor turgor          ED Course   Procedures  Labs Reviewed   CBC W/ AUTO DIFFERENTIAL - Abnormal; Notable for the following:        Result Value    RBC 2.64 (*)     Hemoglobin 8.2 (*)     Hematocrit 27.0 (*)      (*)     MCH 31.1 (*)     MCHC 30.4 (*)     RDW 16.6 (*)     Platelets 111 (*)     Lymph # 0.5 (*)     Gran% 88.4 (*)     Lymph% 6.8 (*)     All other components within normal limits   COMPREHENSIVE METABOLIC PANEL - Abnormal; Notable for the following:     Glucose 126 (*)     BUN, Bld 49 (*)     Creatinine 1.5 (*)     Total Protein 5.4 (*)     Albumin 2.6 (*)     ALT 9 (*)     eGFR if  46 (*)     eGFR if non  40 (*)     All other components within normal limits   LACTIC ACID, PLASMA - Abnormal; Notable for the following:     Lactate (Lactic Acid) 8.5 (*)     All other components within normal limits    Narrative:     LA critical result(s) called and verbal readback obtained from   Kassie Thurston RN on 05/06/18 at 15:23 by Erica Land., 05/06/2018   15:23   TROPONIN I - Abnormal; Notable for the following:     Troponin I 0.063 (*)     All other components within normal limits   B-TYPE  NATRIURETIC PEPTIDE - Abnormal; Notable for the following:      (*)     All other components within normal limits   PROTIME-INR - Abnormal; Notable for the following:     Prothrombin Time 16.9 (*)     INR 1.6 (*)     All other components within normal limits   CULTURE, BLOOD   CULTURE, BLOOD   CULTURE, URINE   MAGNESIUM   PHOSPHORUS   PROCALCITONIN   URINALYSIS   LACTIC ACID, PLASMA     EKG Readings: (Independently Interpreted)   Initial Reading: No STEMI. Rhythm: Sinus Tachycardia. Heart Rate: 133. Ectopy: PVCs. Clinical Impression: Sinus Tachycardia with PVCs      Imaging Results          X-Ray Foot Complete Left (Final result)  Result time 05/06/18 16:10:28    Final result by Noah Yao MD (05/06/18 16:10:28)                 Impression:      Findings in keeping with osteomyelitis involving the head of the 5th metacarpal and base of the 5th proximal phalanx suggesting septic arthritis at the 5th MCP joint.  Findings underlie an area of skin ulceration.    COMMUNICATION  This critical result was discovered/received at 4:05 p.m..  The critical information above was relayed directly by me by telephone to Dr. Csasidy Torres on 4:10 p.m. at 05/06/2018.      Electronically signed by: Noah Yao MD  Date:    05/06/2018  Time:    16:10             Narrative:    EXAMINATION:  XR FOOT COMPLETE 3 VIEW LEFT    CLINICAL HISTORY:  .  Unspecified open wound, unspecified foot, initial encounter    TECHNIQUE:  AP, lateral and oblique views of the left foot were performed.    COMPARISON:  None    FINDINGS:  Lytic destructive osseous changes noted involving the head of the 5th metatarsal with an overlying site of soft tissue ulceration noted.  Findings are in keeping with osteomyelitis.  There is also possi suspected ble involvement of the base of the fifth proximal phalanx with presumed septic arthritis at the 5th MCP joint.  Firmed medial subluxation of the 2nd through 5th MCPs with associated extension  deformities noted.  There is generalized osteopenia.                               X-Ray Chest AP Portable (Final result)  Result time 05/06/18 14:46:23    Final result by Noah Yao MD (05/06/18 14:46:23)                 Impression:      As above.  No acute findings.      Electronically signed by: Noah Yao MD  Date:    05/06/2018  Time:    14:46             Narrative:    EXAMINATION:  XR CHEST AP PORTABLE    CLINICAL HISTORY:  Sepsis;    TECHNIQUE:  Single frontal view of the chest was performed.    COMPARISON:  03/01/2018    FINDINGS:  Cardiac and mediastinal silhouettes are unchanged.  Mediastinal postoperative changes again noted.  Moderate size hiatal hernia again demonstrated.  Chronic appearing interstitial opacities noted in the bilateral lung apices, unchanged from prior.  No definite parenchymal consolidation or pleural effusion visualized.  Multilevel degenerative findings noted throughout the visualized spine.                                X-Rays:   Independently Interpreted Readings:   Chest X-Ray: Normal heart size.  No infiltrates.  No acute abnormalities.     Medical Decision Making:   Initial Assessment:   This is a 89 y.o. male who presents with complaint of altered mental state per patient's son.    Differential Diagnosis:   Acute kidney energy, metabolic derangement, ACS, sepsis, osteomyelitis, multiorgan failure, pneumonia, UTI, anemia  Independently Interpreted Test(s):   I have ordered and independently interpreted X-rays - see prior notes.  I have ordered and independently interpreted EKG Reading(s) - see prior notes  Clinical Tests:   Lab Tests: Ordered and Reviewed       <> Summary of Lab: Trop and bnp elevated  Lactic acid elevated  JEREMY  Radiological Study: Ordered and Reviewed  Medical Tests: Ordered and Reviewed  ED Management:  I have discussed the results with the pt son and the need for admit.  He agrees with plan.      4:55 PM talked to family medicine about  admitting the patient  4:57 PM paged orthopedics     1740:  Family medicine resident in the ED to see the patient and placing admit orders.   Other:   I have discussed this case with another health care provider.                      Clinical Impression:     1. Osteomyelitis of left foot, unspecified type    2. Fatigue    3. Open wound of foot    4. Lactic acidosis    5. JEREMY (acute kidney injury)    6. Dehydration      Disposition:   Disposition: Admitted  Condition: Stable    I, Dr. Cassidy Torres, personally performed the services described in this documentation. All medical record entries made by the scribe were at my direction and in my presence.  I have reviewed the chart and agree that the record reflects my personal performance and is accurate and complete. Cassidy Torres MD.  6:00 PM 05/06/2018                       Cassidy Torres MD  05/06/18 7264

## 2018-05-07 PROBLEM — Z71.89 COUNSELING REGARDING ADVANCED CARE PLANNING AND GOALS OF CARE: Status: ACTIVE | Noted: 2018-05-07

## 2018-05-07 PROBLEM — Z51.5 PALLIATIVE CARE ENCOUNTER: Status: ACTIVE | Noted: 2018-05-07

## 2018-05-07 PROBLEM — D64.9 ANEMIA: Status: ACTIVE | Noted: 2018-05-07

## 2018-05-07 LAB
ABO GROUP BLD: NORMAL
ALBUMIN SERPL BCP-MCNC: 2.5 G/DL
ALLENS TEST: ABNORMAL
ALP SERPL-CCNC: 54 U/L
ALT SERPL W/O P-5'-P-CCNC: 9 U/L
ANION GAP SERPL CALC-SCNC: 10 MMOL/L
AST SERPL-CCNC: 20 U/L
BASOPHILS # BLD AUTO: 0 K/UL
BASOPHILS NFR BLD: 0 %
BILIRUB SERPL-MCNC: 0.6 MG/DL
BLD GP AB SCN CELLS X3 SERPL QL: NORMAL
BLD PROD TYP BPU: NORMAL
BLOOD UNIT EXPIRATION DATE: NORMAL
BLOOD UNIT TYPE CODE: 6200
BLOOD UNIT TYPE: NORMAL
BUN SERPL-MCNC: 52 MG/DL
CALCIUM SERPL-MCNC: 9.7 MG/DL
CHLORIDE SERPL-SCNC: 109 MMOL/L
CO2 SERPL-SCNC: 24 MMOL/L
CODING SYSTEM: NORMAL
CREAT SERPL-MCNC: 1.3 MG/DL
DELSYS: ABNORMAL
DIFFERENTIAL METHOD: ABNORMAL
DISPENSE STATUS: NORMAL
EOSINOPHIL # BLD AUTO: 0 K/UL
EOSINOPHIL NFR BLD: 0 %
ERYTHROCYTE [DISTWIDTH] IN BLOOD BY AUTOMATED COUNT: 17 %
EST. GFR  (AFRICAN AMERICAN): 54 ML/MIN/1.73 M^2
EST. GFR  (NON AFRICAN AMERICAN): 47 ML/MIN/1.73 M^2
FLOW: 1
GLUCOSE SERPL-MCNC: 90 MG/DL
HCO3 UR-SCNC: 27.3 MMOL/L (ref 24–28)
HCT VFR BLD AUTO: 21.5 %
HGB BLD-MCNC: 6.6 G/DL
LYMPHOCYTES # BLD AUTO: 1 K/UL
LYMPHOCYTES NFR BLD: 10.5 %
MAGNESIUM SERPL-MCNC: 1.9 MG/DL
MCH RBC QN AUTO: 31 PG
MCHC RBC AUTO-ENTMCNC: 30.7 G/DL
MCV RBC AUTO: 101 FL
MODE: ABNORMAL
MONOCYTES # BLD AUTO: 0.9 K/UL
MONOCYTES NFR BLD: 9.8 %
NEUTROPHILS # BLD AUTO: 7.4 K/UL
NEUTROPHILS NFR BLD: 79.7 %
PCO2 BLDA: 36.2 MMHG (ref 35–45)
PH SMN: 7.49 [PH] (ref 7.35–7.45)
PHOSPHATE SERPL-MCNC: 2.9 MG/DL
PLATELET # BLD AUTO: 97 K/UL
PMV BLD AUTO: 11.7 FL
PO2 BLDA: 129 MMHG (ref 80–100)
POC BE: 4 MMOL/L
POC SATURATED O2: 99 % (ref 95–100)
POC TCO2: 28 MMOL/L (ref 23–27)
POCT GLUCOSE: 110 MG/DL (ref 70–110)
POCT GLUCOSE: 59 MG/DL (ref 70–110)
POCT GLUCOSE: 63 MG/DL (ref 70–110)
POCT GLUCOSE: 65 MG/DL (ref 70–110)
POCT GLUCOSE: 91 MG/DL (ref 70–110)
POCT GLUCOSE: 99 MG/DL (ref 70–110)
POTASSIUM SERPL-SCNC: 4.9 MMOL/L
PROT SERPL-MCNC: 5 G/DL
RBC # BLD AUTO: 2.13 M/UL
RH BLD: NORMAL
SAMPLE: ABNORMAL
SITE: ABNORMAL
SODIUM SERPL-SCNC: 143 MMOL/L
SP02: 100
TRANS ERYTHROCYTES VOL PATIENT: NORMAL ML
VANCOMYCIN SERPL-MCNC: 6 UG/ML
WBC # BLD AUTO: 9.28 K/UL

## 2018-05-07 PROCEDURE — 83735 ASSAY OF MAGNESIUM: CPT

## 2018-05-07 PROCEDURE — 82803 BLOOD GASES ANY COMBINATION: CPT

## 2018-05-07 PROCEDURE — 93010 ELECTROCARDIOGRAM REPORT: CPT | Mod: 76,,, | Performed by: INTERNAL MEDICINE

## 2018-05-07 PROCEDURE — 25000003 PHARM REV CODE 250: Performed by: FAMILY MEDICINE

## 2018-05-07 PROCEDURE — 25000003 PHARM REV CODE 250: Performed by: STUDENT IN AN ORGANIZED HEALTH CARE EDUCATION/TRAINING PROGRAM

## 2018-05-07 PROCEDURE — 99223 1ST HOSP IP/OBS HIGH 75: CPT | Mod: ,,, | Performed by: FAMILY MEDICINE

## 2018-05-07 PROCEDURE — 27000221 HC OXYGEN, UP TO 24 HOURS

## 2018-05-07 PROCEDURE — 86900 BLOOD TYPING SEROLOGIC ABO: CPT

## 2018-05-07 PROCEDURE — 93005 ELECTROCARDIOGRAM TRACING: CPT

## 2018-05-07 PROCEDURE — 36600 WITHDRAWAL OF ARTERIAL BLOOD: CPT

## 2018-05-07 PROCEDURE — 86901 BLOOD TYPING SEROLOGIC RH(D): CPT

## 2018-05-07 PROCEDURE — 94761 N-INVAS EAR/PLS OXIMETRY MLT: CPT

## 2018-05-07 PROCEDURE — 36415 COLL VENOUS BLD VENIPUNCTURE: CPT

## 2018-05-07 PROCEDURE — 99222 1ST HOSP IP/OBS MODERATE 55: CPT | Mod: ,,, | Performed by: PODIATRIST

## 2018-05-07 PROCEDURE — P9021 RED BLOOD CELLS UNIT: HCPCS

## 2018-05-07 PROCEDURE — 86920 COMPATIBILITY TEST SPIN: CPT

## 2018-05-07 PROCEDURE — 80202 ASSAY OF VANCOMYCIN: CPT

## 2018-05-07 PROCEDURE — 63600175 PHARM REV CODE 636 W HCPCS: Performed by: FAMILY MEDICINE

## 2018-05-07 PROCEDURE — 85025 COMPLETE CBC W/AUTO DIFF WBC: CPT

## 2018-05-07 PROCEDURE — 84100 ASSAY OF PHOSPHORUS: CPT

## 2018-05-07 PROCEDURE — 20000000 HC ICU ROOM

## 2018-05-07 PROCEDURE — 80053 COMPREHEN METABOLIC PANEL: CPT

## 2018-05-07 PROCEDURE — 93010 ELECTROCARDIOGRAM REPORT: CPT | Mod: ,,, | Performed by: INTERNAL MEDICINE

## 2018-05-07 PROCEDURE — 86850 RBC ANTIBODY SCREEN: CPT

## 2018-05-07 RX ORDER — CEFEPIME HYDROCHLORIDE 2 G/1
2 INJECTION, POWDER, FOR SOLUTION INTRAVENOUS
Status: DISCONTINUED | OUTPATIENT
Start: 2018-05-08 | End: 2018-05-09

## 2018-05-07 RX ORDER — HYDROCODONE BITARTRATE AND ACETAMINOPHEN 500; 5 MG/1; MG/1
TABLET ORAL
Status: DISCONTINUED | OUTPATIENT
Start: 2018-05-07 | End: 2018-05-10

## 2018-05-07 RX ORDER — SODIUM CHLORIDE 450 MG/100ML
INJECTION, SOLUTION INTRAVENOUS CONTINUOUS
Status: ACTIVE | OUTPATIENT
Start: 2018-05-07 | End: 2018-05-07

## 2018-05-07 RX ADMIN — SODIUM CHLORIDE: 0.45 INJECTION, SOLUTION INTRAVENOUS at 07:05

## 2018-05-07 RX ADMIN — SODIUM CHLORIDE 500 ML: 0.9 INJECTION, SOLUTION INTRAVENOUS at 07:05

## 2018-05-07 RX ADMIN — CEFEPIME 2 G: 2 INJECTION, POWDER, FOR SOLUTION INTRAMUSCULAR; INTRAVENOUS at 08:05

## 2018-05-07 RX ADMIN — VANCOMYCIN HYDROCHLORIDE 750 MG: 750 INJECTION, POWDER, LYOPHILIZED, FOR SOLUTION INTRAVENOUS at 10:05

## 2018-05-07 RX ADMIN — DEXTROSE MONOHYDRATE 12.5 G: 500 INJECTION PARENTERAL at 02:05

## 2018-05-07 RX ADMIN — HEPARIN SODIUM 5000 UNITS: 5000 INJECTION, SOLUTION INTRAVENOUS; SUBCUTANEOUS at 09:05

## 2018-05-07 RX ADMIN — HEPARIN SODIUM 5000 UNITS: 5000 INJECTION, SOLUTION INTRAVENOUS; SUBCUTANEOUS at 02:05

## 2018-05-07 RX ADMIN — DEXTROSE MONOHYDRATE 12.5 G: 500 INJECTION PARENTERAL at 11:05

## 2018-05-07 RX ADMIN — HEPARIN SODIUM 5000 UNITS: 5000 INJECTION, SOLUTION INTRAVENOUS; SUBCUTANEOUS at 07:05

## 2018-05-07 RX ADMIN — SODIUM CHLORIDE: 9 INJECTION, SOLUTION INTRAVENOUS at 02:05

## 2018-05-07 RX ADMIN — DEXTROSE MONOHYDRATE 12.5 G: 500 INJECTION PARENTERAL at 12:05

## 2018-05-07 RX ADMIN — BIMATOPROST 1 DROP: 0.1 SOLUTION/ DROPS OPHTHALMIC at 09:05

## 2018-05-07 NOTE — HPI
Patient is a 89 y.o. male with a previous admission for severe dehydration, HFpEF, recent admission to [facility] for Osteomyeloitis of left plantar region of great toe, HTN, presents to the ED because daughter in law states that when she attempted to feed him his eyes rolled in the back of his head and she thought he was dying. On presentation to the ED patient was lethargic, hypotensive and noted to have chronic unchanged wound on left plantar aspect of foot.   Patient received appropriate fluid resuscitation for SEPSIS and was started on broad spectrum abx.   Chart review from [facility] shows that patient was being treated for suspected Osteo with PICC line and abx, patient's daughter in law refused abx via PICC and was discharged by inpatient team at [facility] with po abx.   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 Angio was not done therefore no surgery on osteo location was done.     Palliative care has been consulted for goals of care discussion/advance care planning

## 2018-05-07 NOTE — PT/OT/SLP PROGRESS
Speech Language Pathology  Attempted Visit    Raghav Greer  MRN: [id number]    Patient not seen today secondary to Nursing hold (Comment). RN stated pt is not medically appropriate for ST services at this time. SLP will follow-up next date.    LUIS ARMANDO Rincon., CF-SLP  Speech-Language Pathologist   5/7/2018

## 2018-05-07 NOTE — SUBJECTIVE & OBJECTIVE
Scheduled Meds:   aspirin  325 mg Oral Daily    bimatoprost  1 drop Both Eyes QHS    ceFEPime (MAXIPIME) IVPB  2 g Intravenous Q12H    heparin (porcine)  5,000 Units Subcutaneous Q8H     Continuous Infusions:   sodium chloride 0.45% 100 mL/hr at 05/07/18 0758     PRN Meds:sodium chloride, dextrose 50%, dextrose 50%, glucagon (human recombinant), glucose, glucose, insulin aspart U-100, ondansetron, sodium chloride 0.9%    Review of patient's allergies indicates:  No Known Allergies     Past Medical History:   Diagnosis Date    CHF (congestive heart failure)     Diabetes mellitus     Hypertension      History reviewed. No pertinent surgical history.    Family History     None        Social History Main Topics    Smoking status: Unknown If Ever Smoked    Smokeless tobacco: Not on file      Comment: Pt unable to answer questions.    Alcohol use No      Comment: Pt unable to answer questions.    Drug use: Unknown      Comment: Pt unable to answer questions.    Sexual activity: Not on file      Comment: Pt unable to answer questions.     Review of Systems   Unable to perform ROS: Patient nonverbal     Objective:     Vital Signs (Most Recent):  Temp: 96.7 °F (35.9 °C) (05/07/18 0730)  Pulse: (!) 128 (05/07/18 0845)  Resp: (!) 22 (05/07/18 0845)  BP: (!) 97/46 (05/07/18 0845)  SpO2: 100 % (05/07/18 0845) Vital Signs (24h Range):  Temp:  [96.7 °F (35.9 °C)-98.5 °F (36.9 °C)] 96.7 °F (35.9 °C)  Pulse:  [125-134] 128  Resp:  [9-26] 22  SpO2:  [100 %] 100 %  BP: ()/(34-93) 97/46     Weight: 52 kg (114 lb 10.2 oz)  Body mass index is 17.96 kg/m².    Foot Exam    General  Orientation: unable to assess       Right Foot/Ankle     Inspection and Palpation  Swelling: none     Neurovascular  Dorsalis pedis: absent  Posterior tibial: absent      Left Foot/Ankle      Inspection and Palpation  Swelling: none   Skin Exam: ulcer;     Neurovascular  Dorsalis pedis: absent  Posterior tibial: absent        Wound 1: Left  lateral foot   Measurement: 3hcb3cvp0.3cm  Base: fibrogranular base   Periwound skin: callus  Drainage: none  Probe: probes to bone                 Laboratory:  CBC:   Recent Labs  Lab 05/07/18  0400   WBC 9.28   RBC 2.13*   HGB 6.6*   HCT 21.5*   PLT 97*   *   MCH 31.0   MCHC 30.7*     CMP:   Recent Labs  Lab 05/07/18  0400   GLU 90   CALCIUM 9.7   ALBUMIN 2.5*   PROT 5.0*      K 4.9   CO2 24      BUN 52*   CREATININE 1.3   ALKPHOS 54*   ALT 9*   AST 20   BILITOT 0.6       Diagnostic Results:  None    Clinical Findings:  Ulceration left lateral foot stable no purulent drainage noted.

## 2018-05-07 NOTE — PROGRESS NOTES
Pharmacist Renal Dose Adjustment Note    Raghav Greer is a 89 y.o. male being treated with the medication cefepime    Patient Data:    Vital Signs (Most Recent):  Temp: 97.2 °F (36.2 °C) (05/07/18 1530)  Pulse: (!) 115 (05/07/18 1530)  Resp: (!) 34 (05/07/18 1530)  BP: (!) 115/57 (05/07/18 1530)  SpO2: 100 % (05/07/18 1530)   Vital Signs (72h Range):  Temp:  [96.4 °F (35.8 °C)-98.5 °F (36.9 °C)]   Pulse:  [112-134]   Resp:  [9-34]   BP: ()/(33-93)   SpO2:  [98 %-100 %]        Recent Labs     Lab 05/06/18  1445 05/07/18  0400   CREATININE 1.5* 1.3     Serum creatinine: 1.3 mg/dL 05/07/18 0400  Estimated creatinine clearance: 26.1 mL/min    Medication:cefepime dose: 2g frequency q12 will be changed to medication:cefepime dose:2g frequency:q24    Pharmacist's Name: Navid Cote  Pharmacist's Extension: [id number]

## 2018-05-07 NOTE — SUBJECTIVE & OBJECTIVE
Interval History: Palliative care met with patient awake and alert. Grandson-Romulo by bedside stated patient responded this morning by pooping eyes open to speak with his daughter Tania over the phone.   Romulo stated patient lives with his son Terrance (SOHAIL) and daughter in law and  at baseline is wheelchair bound and needed assistance with all ADL to include transfer from bed to wheelchair and vice versa. Patient is verbal some at baseline and had stated he does not want amputation and reaffirmed DNR status.   Primary team came into the room and discussed plan of care with Romulo to include IVF for hypotension, blood transfusion for low H/H and podiatry consult for left foot infection. Romulo stated ok to continue with treatment.  Palliative care discussed with Romulo for a possible family meeting with patient's son Terrance (SOHAIL) later today to discuss goals of care. Emotional comfort provided.           Past Medical History:   Diagnosis Date    CHF (congestive heart failure)     Diabetes mellitus     Hypertension        History reviewed. No pertinent surgical history.    Review of patient's allergies indicates:  No Known Allergies    Medications:  Continuous Infusions:   sodium chloride 0.45% 100 mL/hr at 05/07/18 0758     Scheduled Meds:   aspirin  325 mg Oral Daily    bimatoprost  1 drop Both Eyes QHS    ceFEPime (MAXIPIME) IVPB  2 g Intravenous Q12H    heparin (porcine)  5,000 Units Subcutaneous Q8H     PRN Meds:sodium chloride, dextrose 50%, dextrose 50%, glucagon (human recombinant), glucose, glucose, insulin aspart U-100, ondansetron, sodium chloride 0.9%    Family History     None        Social History Main Topics    Smoking status: Unknown If Ever Smoked    Smokeless tobacco: Not on file      Comment: Pt unable to answer questions.    Alcohol use No      Comment: Pt unable to answer questions.    Drug use: Unknown      Comment: Pt unable to answer questions.    Sexual activity: Not on file       Comment: Pt unable to answer questions.       Review of Systems   Unable to perform ROS: Patient nonverbal     Objective:     Vital Signs (Most Recent):  Temp: 96.7 °F (35.9 °C) (05/07/18 0730)  Pulse: (!) 128 (05/07/18 0745)  Resp: (!) 24 (05/07/18 0745)  BP: (!) 73/42 (05/07/18 0745)  SpO2: 100 % (05/07/18 0745) Vital Signs (24h Range):  Temp:  [96.7 °F (35.9 °C)-98.5 °F (36.9 °C)] 96.7 °F (35.9 °C)  Pulse:  [125-134] 128  Resp:  [9-26] 24  SpO2:  [100 %] 100 %  BP: ()/(40-93) 73/42     Weight: 52 kg (114 lb 10.2 oz)  Body mass index is 17.96 kg/m².    Review of Symptoms  Symptom Assessment (ESAS 0-10 scale)   ESAS 0 1 2 3 4 5 6 7 8 9 10   Pain              Dyspnea              Anxiety              Nausea              Depression               Anorexia              Fatigue              Insomnia              Restlessness               Agitation              CAM / Delirium __ --  ___+   Constipation     __ --  ___+   Diarrhea           __ --  ___+  Bowel Management Plan (BMP): No      Performance Status: 30    ECOG Performance Status Grade: 4 - Completely disabled    Physical Exam   Constitutional: He appears well-developed.   Cachetic, pale   HENT:   Head: Normocephalic and atraumatic.   Eyes: EOM are normal. Pupils are equal, round, and reactive to light.   Neck: Normal range of motion. Neck supple. No tracheal deviation present.   Cardiovascular: Regular rhythm.  Tachycardia present.    HR in the 120s   Pulmonary/Chest: Breath sounds normal. He has no wheezes. He has no rales.   Abdominal: Soft. Bowel sounds are normal. There is no tenderness.   Musculoskeletal: He exhibits edema.   L foot wound    Neurological: He is alert.   Mumbles words. Grunt to questions.    Skin: Skin is warm.   Sacral wound with dressing clean and dry       Significant Labs:   BMP:   Recent Labs  Lab 05/07/18  0400   GLU 90      K 4.9      CO2 24   BUN 52*   CREATININE 1.3   CALCIUM 9.7   MG 1.9     CBC:   Recent  Labs  Lab 05/06/18  1445 05/07/18  0400   WBC 7.02 9.28   HGB 8.2* 6.6*   HCT 27.0* 21.5*   * 97*     Lactic acid:   Recent Labs  Lab 05/06/18  1445 05/06/18  1838   LACTATE 8.5* 6.2*     LFT:   Recent Labs  Lab 05/07/18  0400   AST 20   ALKPHOS 54*   BILITOT 0.6     CBC:     Recent Labs  Lab 05/07/18  0400   WBC 9.28   HGB 6.6*   HCT 21.5*   *   PLT 97*     BMP:    Recent Labs  Lab 05/07/18  0400   GLU 90      K 4.9      CO2 24   BUN 52*   CREATININE 1.3   CALCIUM 9.7   MG 1.9     LFT:  Lab Results   Component Value Date    AST 20 05/07/2018    ALKPHOS 54 (L) 05/07/2018    BILITOT 0.6 05/07/2018     Albumin:   Albumin   Date Value Ref Range Status   05/07/2018 2.5 (L) 3.5 - 5.2 g/dL Final     Protein:   Total Protein   Date Value Ref Range Status   05/07/2018 5.0 (L) 6.0 - 8.4 g/dL Final     Lactic acid:   Lab Results   Component Value Date    LACTATE 6.2 (HH) 05/06/2018    LACTATE 8.5 (HH) 05/06/2018       Significant Imaging: see h&p    Advanced Directives::  Living Will: No  LaPOST: Yes  Do Not Resuscitate Status: Yes  Medical Power of : Yes. Agent's Name: Terrance.     Decision-Making Capacity: Family answered questions, Patient unable to communicate due to disease severity/cognitive impairment    Living Arrangements: Lives with family

## 2018-05-07 NOTE — HPI
Patient is a 89 y.o. 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 Pt. Nonverbal, no family present at bedside. History obtained from chart review. Pt. With osteomyelitis left foot previously treated at [facility]. PICC line for IV abx recommended along with angio due to severe PAD however patient's daughter in law declines treatment. Pt. Is DNR.

## 2018-05-07 NOTE — SUBJECTIVE & OBJECTIVE
Interval History:   Palliative care met with patient awake and alert. Grandson-Romulo by bedside stated patient responded this morning by pooping eyes open to speak with his daughter Tania over the phone.   Romulo stated patient lives with his son Terrance (SOHAIL) and daughter in law and  at baseline is wheelchair bound and needed assistance with all ADL to include transfer from bed to wheelchair and vice versa. Patient is verbal some at baseline and had stated he does not want amputation and reaffirmed DNR status.   Primary team came into the room and discussed plan of care with Romulo to include IVF for hypotension, blood transfusion for low H/H and podiatry consult for left foot infection. Romulo stated ok to continue with treatment.  Palliative care discussed with Romulo for a possible family meeting with patient's son Terrance (SOHAIL) later today to discuss goals of care. Emotional comfort provided.            Medications:  Continuous Infusions:   sodium chloride 0.45% 100 mL/hr at 05/07/18 0758     Scheduled Meds:   aspirin  325 mg Oral Daily    bimatoprost  1 drop Both Eyes QHS    ceFEPime (MAXIPIME) IVPB  2 g Intravenous Q12H    heparin (porcine)  5,000 Units Subcutaneous Q8H     PRN Meds:sodium chloride, dextrose 50%, dextrose 50%, glucagon (human recombinant), glucose, glucose, insulin aspart U-100, ondansetron, sodium chloride 0.9%    Objective:     Vital Signs (Most Recent):  Temp: 97.2 °F (36.2 °C) (05/07/18 1530)  Pulse: (!) 115 (05/07/18 1530)  Resp: (!) 34 (05/07/18 1530)  BP: (!) 115/57 (05/07/18 1530)  SpO2: 100 % (05/07/18 1530) Vital Signs (24h Range):  Temp:  [96.4 °F (35.8 °C)-98.5 °F (36.9 °C)] 97.2 °F (36.2 °C)  Pulse:  [112-134] 115  Resp:  [9-34] 34  SpO2:  [98 %-100 %] 100 %  BP: ()/(33-93) 115/57     Weight: 52 kg (114 lb 10.2 oz)  Body mass index is 17.96 kg/m².    Review of Symptoms  Symptom Assessment (ESAS 0-10 scale)  ESAS 0 1 2 3 4 5 6 7 8 9 10   Pain              Dyspnea               Anxiety              Nausea              Depression               Anorexia              Fatigue              Insomnia              Restlessness               Agitation              CAM / Delirium __ --  ___+   Constipation     __ --  ___+   Diarrhea           __ --  ___+  Bowel Management Plan (BMP): No      Pain Assessment: unable to assess    Performance Status: 30    ECOG Performance Status Grade: 4 - Completely disabled    Physical Exam   Constitutional: He appears well-developed.   Cachetic, pale    HENT:   Head: Normocephalic and atraumatic.   Eyes: EOM are normal. Pupils are equal, round, and reactive to light.   Neck: Normal range of motion. Neck supple.   Cardiovascular: Tachycardia present.    HR in the 120s    Pulmonary/Chest: Effort normal. He has no wheezes. He has no rales.   Abdominal: Soft. Bowel sounds are normal. He exhibits no distension. There is no tenderness.   Musculoskeletal:   L foot wound     Neurological: He is alert. He displays abnormal reflex.   Mumbles words. Grunt to questions.    Skin: Skin is warm.   Sacral wound with dressing clean and dry        Significant Labs:   CBC:   Recent Labs  Lab 05/06/18  1445 05/07/18  0400   WBC 7.02 9.28   HGB 8.2* 6.6*   HCT 27.0* 21.5*   * 97*     CMP:   Recent Labs  Lab 05/06/18  1445 05/07/18  0400    143   K 4.5 4.9    109   CO2 28 24   * 90   BUN 49* 52*   CREATININE 1.5* 1.3   CALCIUM 10.3 9.7   PROT 5.4* 5.0*   ALBUMIN 2.6* 2.5*   BILITOT 0.7 0.6   ALKPHOS 64 54*   AST 11 20   ALT 9* 9*   ANIONGAP 11 10   EGFRNONAA 40* 47*     Prealbumin: No results for input(s): PREALBUMIN in the last 48 hours.  CBC:     Recent Labs  Lab 05/07/18  0400   WBC 9.28   HGB 6.6*   HCT 21.5*   *   PLT 97*     BMP:    Recent Labs  Lab 05/07/18  0400   GLU 90      K 4.9      CO2 24   BUN 52*   CREATININE 1.3   CALCIUM 9.7   MG 1.9     LFT:  Lab Results   Component Value Date    AST 20 05/07/2018    ALKPHOS 54 (L)  05/07/2018    BILITOT 0.6 05/07/2018     Albumin:   Albumin   Date Value Ref Range Status   05/07/2018 2.5 (L) 3.5 - 5.2 g/dL Final     Protein:   Total Protein   Date Value Ref Range Status   05/07/2018 5.0 (L) 6.0 - 8.4 g/dL Final     Lactic acid:   Lab Results   Component Value Date    LACTATE 6.2 (HH) 05/06/2018    LACTATE 8.5 () 05/06/2018       Significant Imaging: see h and p    Advanced Directives::  Living Will: No  LaPOST: Yes  Do Not Resuscitate Status: Yes  Medical Power of : Yes. Agent's Name: Terrance.    Decision-Making Capacity: Family answered questions, Patient unable to communicate due to disease severity/cognitive impairment    Living Arrangements: Lives with family

## 2018-05-07 NOTE — PLAN OF CARE
Patient in ICU    No family in room at his time. Per nurse, family in room earlier today.  Per medical room and per nurse- patient lives at home with son and daughter in law. Terrance Greer and Tiffanie Greer ([facility])- left message on voicemail. From medical record Palliative care consult pending.    Will continue to follow and work with palliative care to assist with discharge planning.       05/07/18 1645   Discharge Assessment   Assessment Type Discharge Planning Assessment   Assessment information obtained from? Medical Record   Lives With child(debbie), adult   Able to Return to Prior Arrangements unable to determine at this time (comments)     Veronique Jameson, RN, CCM, CMSRN  RN Transition Navigator  [phone]

## 2018-05-07 NOTE — ED NOTES
Received report from Kiana. Pt's speech is hard to understand, but he does state understanding when reoriented to place and that I am his new nurse. When asked about pain, pt states his foot hurts. Looking at pt's left foot wound located proximally to the plantar 5th digit, he has red tissue, blackish/dark red tissue, and cream-colored tissue inside his wound. The site is dry at this time.     Pt's condom catheter has yielded no urine at this time.

## 2018-05-07 NOTE — H&P
[facility]  History & Physical    SUBJECTIVE:     Chief Complaint/Reason for Admission: Brought in by Family for Concern for Eating     History of Present Illness:  Patient is a 89 y.o. 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 On presentation to the ED patient was lethargic, hypotensive and noted to have chronic unchanged wound on left plantar aspect of foot.   Patient received appropriate fluid resuscitation for SEPSIS and was started on broad spectrum abx.   Chart review from [facility] shows that patient was being treated for suspected Osteo with PICC line and abx, patient's daughter in law refused abx via PICC and was discharged by inpatient team at [facility] with po abx.   According to [facility] noted for Osteo, patient has sever PAD located in the internal illiac region due to high cardio pulmonary risk again daughter decided to see how well he would do with non invasive treatment. Angio was not done therefore no surgery on osteo location was done.     PTA Medications   Medication Sig    acetaminophen (TYLENOL) 325 MG tablet Take 325 mg by mouth every 6 (six) hours as needed for Pain.    amLODIPine (NORVASC) 10 MG tablet Take 10 mg by mouth once daily.    brimonidine 0.2% (ALPHAGAN) 0.2 % Drop 1 drop every 8 (eight) hours.    docusate sodium (COLACE) 100 MG capsule Take 100 mg by mouth once daily at 6am.    finasteride (PROSCAR) 5 mg tablet Take 5 mg by mouth once daily.    furosemide (LASIX) 40 MG tablet Take 1 tablet (40 mg total) by mouth daily as needed (cough with copious white mucous).    hydrocodone-acetaminophen 5-325mg (NORCO) 5-325 mg per tablet Take 1 tablet by mouth every 8 (eight) hours as needed for Pain.     metoprolol succinate (TOPROL-XL) 25 MG 24 hr tablet Take 1 tablet (25 mg total) by mouth once daily.    metoprolol tartrate (LOPRESSOR) 25 MG tablet Take 1 tablet (25 mg total) by mouth 2 (two) times daily.    metoprolol tartrate (LOPRESSOR) 25 MG tablet Take 1 tablet (25 mg total) by mouth 2 (two) times daily.    metoprolol tartrate (LOPRESSOR) 25 MG tablet Take 1 tablet (25 mg total) by mouth 2 (two) times daily.    mirtazapine (REMERON) 15 MG tablet Take 15 mg by mouth every evening.    omeprazole (PRILOSEC) 20 MG capsule Take 20 mg by mouth once daily.    polyvinyl alcohol, artificial tears, (LIQUIFILM TEARS) 1.4 % ophthalmic solution 1 drop as needed.    ranitidine (ZANTAC) 150 MG tablet Take 150 mg by mouth 2 (two) times daily.    sertraline (ZOLOFT) 50 MG tablet Take 25 mg by mouth every evening.     terazosin (HYTRIN) 5 MG capsule Take 5 mg by mouth every evening.    travoprost, benzalkonium, (TRAVATAN) 0.004 % ophthalmic solution 1 drop every evening.       Review of patient's allergies indicates:  No Known Allergies    Past Medical History:   Diagnosis Date    CHF (congestive heart failure)     Diabetes mellitus     Hypertension      History reviewed. No pertinent surgical history.  History reviewed. No pertinent family history.  Social History   Substance Use Topics    Smoking status: Unknown If Ever Smoked    Smokeless tobacco: Not on file      Comment: Pt unable to answer questions.    Alcohol use No      Comment: Pt unable to answer questions.        Review of Systems:  Review of systems not obtained due to patient factors can not make out patient's speach.    OBJECTIVE:     Vital Signs (Most Recent):  Temp: 98.5 °F (36.9 °C) (05/06/18 1909)  Pulse: (!) 128 (05/06/18 1900)  Resp: 20 (05/06/18 1900)  BP: 124/71 (05/06/18 1900)  SpO2: 100 % (05/06/18 1909)    Physical Exam:  Nursing note and vitals reviewed.  Constitutional:   Cachectic elderly AAM   HENT:   Head: Normocephalic and  atraumatic.   Dry, cracked MM   Eyes: Conjunctivae are normal. Pupils are equal, round, and reactive to light.   Neck: Normal range of motion.   Cardiovascular: Regular rhythm.   Tachycardia    Pulmonary/Chest: Breath sounds normal. No respiratory distress.   Abdominal: Soft. He exhibits no distension. There is no tenderness.   Musculoskeletal:   Open wound left lateral foot with bony exposure.  Weak but palpable DP and PT pulse noted.  Foot is cool to touch.    Neurological: He is alert.   Awake and alert.  Pt is oriented to person and place   Skin: Skin is warm.   Stage 3 ulcer on sacrum, Unstageable ulcer to the lateral left foot with bony exposure, Poor turgor      Laboratory:  CBC:   Recent Labs  Lab 05/06/18  1445   WBC 7.02   RBC 2.64*   HGB 8.2*   HCT 27.0*   *   *   MCH 31.1*   MCHC 30.4*     BMP:   Recent Labs  Lab 05/06/18  1445   *      K 4.5      CO2 28   BUN 49*   CREATININE 1.5*   CALCIUM 10.3   MG 2.1     CMP:   Recent Labs  Lab 05/06/18  1445   *   CALCIUM 10.3   ALBUMIN 2.6*   PROT 5.4*      K 4.5   CO2 28      BUN 49*   CREATININE 1.5*   ALKPHOS 64   ALT 9*   AST 11   BILITOT 0.7     LFTs:   Recent Labs  Lab 05/06/18  1445   ALT 9*   AST 11   ALKPHOS 64   BILITOT 0.7   PROT 5.4*   ALBUMIN 2.6*     Coagulation:   Recent Labs  Lab 05/06/18  1445   LABPROT 16.9*   INR 1.6*     Cardiac markers:   Recent Labs  Lab 05/06/18  1445   TROPONINI 0.063*     Microbiology Results (last 7 days)     Procedure Component Value Units Date/Time    Aerobic culture [[id number]]     Order Status:  No result Specimen:  Wound from Foot, Left     Blood culture x two cultures. Draw prior to antibiotics. [[id number]] Collected:  05/06/18 1704    Order Status:  Sent Specimen:  Blood from Peripheral, Antecubital, Left Updated:  05/06/18 1828    Blood culture x two cultures. Draw prior to antibiotics. [[id number]] Collected:  05/06/18 1446    Order Status:  Sent Specimen:  Blood  from Peripheral, Antecubital, Right Updated:  05/06/18 1543    Urine culture [[id number]]     Order Status:  No result Specimen:  Urine         Specimen (12h ago through future)    None        No results for input(s): COLORU, CLARITYU, SPECGRAV, PHUR, PROTEINUA, GLUCOSEU, BILIRUBINCON, BLOODU, WBCU, RBCU, BACTERIA, MUCUS, NITRITE, LEUKOCYTESUR, UROBILINOGEN, HYALINECASTS in the last 168 hours.    Diagnostic Results:  Xray of L foot: Findings in keeping with osteomyelitis involving the head of the 5th metacarpal and base of the 5th proximal phalanx suggesting septic arthritis at the 5th MCP joint    XR CHEST AP PORTABLE: No signs of consolidation, edema, or effusion     ASSESSMENT/PLAN:   Patient is a 89 yo male DNR, with known hx of HFpEF, Cachexia, HTN, and recent admissions for both osteomyelitis and severe dehydration      Neuro  Patient has baslline dementia, appears to purposefully move all extremities, no focal CN deficits noted, low concern for CVA    CVS  Patient hypotensive on initial presentation, responded to fluids, will hold all home HTN medications until appropriate, Hypotension most likely due to little to no PO intake, no signs from hx or labs that patient was vomiting or having diarrhea Did have elevation in troponin, will rule out ACS on this admission.    Pulm  Patient has normal oxygen saturation, no findings on CXR of concern will continue to monitor.    GI  Patient has multiple signs of dehydration including pre renal johnson, dry membranes, cracked lips, denies abdominal pain. Strong suspicion that all lab abnormalities in this patient are due to decrease PO intake. POA is unsure whether patient would like a PEG tube, will have speech evaluate patient for ability to pass swallow study. Until then NPO, will receive IV hydration until cleared.     HEME/ID  Patient does have osteo with image conferming, have started on Vanc/Zosyn Day1, cultured blood, urine and wound, have consulted podiatry. Sabi  to POA in regards to whether or not PICC line would be allowed this time and she stated she will have to make this choice in the morning. At this time CRP, ESR are pending, concern for sever sepsis with possible source being osteo. Will trend lactic acid 8.5->6.4.     Patient is DNR, Medical POA is daughter ernst.   Follow up on volume status, lactic acid, BP and sepsis work up 2/2 Osteo.     Clyde Miller MD  5/6/2018  [employer] [facility] PGY-3

## 2018-05-07 NOTE — NURSING TRANSFER
Nursing Transfer Note      5/7/2018     Transfer To: ICU from ED.    Transfer via stretcher    Transfer with 2L per NC to O2, cardiac monitoring    Transported by RN     Medicines sent: None    Chart send with patient: Yes    Notified: son      Patient reassessed at: 05/06/2018, 1951    Upon arrival to floor: cardiac monitor applied, patient oriented to room, and bed in lowest position. He arouses to voice and follows command. Patient is oriented to self only, disoriented to place, time, and situation: speech is garbled. PIVs CDI x2, condom catheter CDI. General POC reviewed with patient's son at bedside.

## 2018-05-07 NOTE — PROGRESS NOTES
05/07/18 0706   Vital Signs   Pulse (!) 128   Resp 19   SpO2 100 %   Pulse Oximetry Type Continuous   Flow (L/min) 2   O2 Device (Oxygen Therapy) nasal cannula   BP (!) 89/53   At bedside for bedside report, and noted that patient not responding as he was before. Also noted that SBP as above, which is a sudden drop from prior as well. POCT Blood glucose was 91. Repeat BP was lower with SBP now in the 70s, and patient still somnolent. Primary team notified of patient's status, order for STAT ABG and 500 mls NS bolus. RRT notified of ABG order.

## 2018-05-07 NOTE — CHAPLAIN
Family requested  anointing for patient. Nurse called me.Pt lives in [facility] so I called [facility] first but the priests were not available at this time.  I called the emergency line and called [facility].   on duty was at lunch and will call me before coming to hospital.

## 2018-05-07 NOTE — EICU
08:13 eICU contacted by bedside RN to call in a referral to [facility]. I called and spoke to [facility]. Pt currently does not meet criteria as he is not ventilated with a GCS of less than 5. This information was relayed to the bedside nurse.

## 2018-05-07 NOTE — PT/OT/SLP PROGRESS
Physical Therapy      Patient Name:  Raghav Greer   MRN:  [id number]    Patient not seen today secondary to RN hold 2/2 pt with decline in status and not appropriate for therapy at this time. Will follow-up as appropriate.    Rita Franco, PT   5/7/2018

## 2018-05-07 NOTE — PLAN OF CARE
Recommendations     Recommendation/Intervention:   1. Advance diet as able per SLP to 2 gm sodium; 2000 ADA   2. If unable to advance po diet and TF desired rec   - Glucerna 1.5 initiated @ 10 ml/hr and advanced 10 ml q 12 hrs to goal rate of 50 ml/hr.   -Replete lytes and monitor K/Phos/Mg/Ca for refeeding shifts due to age, extended NPO, indicators of malnutrition; slow advancement of  to 10 ml q 24 hrs if lytes unstable.   -Rec thiamin 100 mg 3-5 days with concerns for malnutrition.   -Fluid flushes for normal fluid intake: 150 ml q 4 hrs or per MD.   -Hold TF for n/v/abd discomfort; HOB >30 (If Residuals checked, hold if >500 ml).   TF to provide: 1800 kcal, 99g pro, 911 ml fluid  3. RD to monitor     Goals: Initiate nutrition within 72 hrs or per family goals of care  Nutrition Goal Status: new  Communication of RD Recs:  (plan of care)

## 2018-05-07 NOTE — ASSESSMENT & PLAN NOTE
Per previous chart review patient's family declines IV abx and angio will continue with local wound care.   Nursing orders in for daily dressing changes.   Podiatry will follow      Offloading Device: Heel protector      Iva Conteh DPM PGY-3  Pager: [phone]

## 2018-05-07 NOTE — PROGRESS NOTES
Progress Note    Admit Date: 5/6/2018   LOS: 1 day     SUBJECTIVE:     Pt lethargic but arousable, family at bedside. Pt currently stable.    Scheduled Meds:   aspirin  325 mg Oral Daily    bimatoprost  1 drop Both Eyes QHS    ceFEPime (MAXIPIME) IVPB  2 g Intravenous Q12H    heparin (porcine)  5,000 Units Subcutaneous Q8H     Continuous Infusions:   sodium chloride 0.45% 100 mL/hr at 05/07/18 0758     PRN Meds:sodium chloride, dextrose 50%, dextrose 50%, glucagon (human recombinant), glucose, glucose, insulin aspart U-100, ondansetron, sodium chloride 0.9%    Review of patient's allergies indicates:  No Known Allergies    Review of Systems  Unable to perform    OBJECTIVE:     Vital Signs (Most Recent)  Temp: 96.4 °F (35.8 °C) (05/07/18 1105)  Pulse: (!) 124 (05/07/18 1245)  Resp: (!) 28 (05/07/18 1245)  BP: (!) 91/50 (05/07/18 1245)  SpO2: 100 % (05/07/18 1245)    Vital Signs Range (Last 24H):  Temp:  [96.4 °F (35.8 °C)-98.5 °F (36.9 °C)]   Pulse:  [124-134]   Resp:  [9-29]   BP: ()/(33-93)   SpO2:  [100 %]     I & O (Last 24H):  Intake/Output Summary (Last 24 hours) at 05/07/18 1415  Last data filed at 05/07/18 0758   Gross per 24 hour   Intake          1248.34 ml   Output              285 ml   Net           963.34 ml     Physical Exam:  Gen: NAD  Head: NCAT  Heart: RRR, no m/g/r  Lungs: CTAB  Abd: snt nd  Ext: 2 + peripheral pulses      ASSESSMENT/PLAN:     Patient is a 89 yo male DNR, with known hx of HFpEF, Cachexia, HTN, and recent admissions for both osteomyelitis and severe dehydration       Neuro  Patient has baslline dementia, appears to purposefully move all extremities, no focal CN deficits noted, low concern for CVA     CVS  Patient hypotensive on initial presentation, responded to fluids, will hold all home HTN medications until appropriate, Hypotension most likely due to little to no PO intake, no signs from hx or labs that patient was vomiting or having diarrhea Did have elevation in  troponin, will rule out ACS on this admission.  - BP improved with bolus of 500cc, will continue to monitor     Pulm  Patient has normal oxygen saturation, no findings on CXR of concern will continue to monitor.     GI  Patient has multiple signs of dehydration including pre renal johnson, dry membranes, cracked lips, denies abdominal pain. Strong suspicion that all lab abnormalities in this patient are due to decrease PO intake. POA is unsure whether patient would like a PEG tube, will have speech evaluate patient for ability to pass swallow study.     HEME/ID  Patient does have osteo with image conferming, have started on Vanc/Zosyn Day1, cultured blood, urine and wound, have consulted podiatry. Spopke to POA in regards to whether or not PICC line would be allowed this time and she stated she will have to make this choice in the morning. At this time CRP, ESR are pending, concern for sever sepsis with possible source being osteo. Will trend lactic acid 8.5->6.4.     Renal  - 285cc recorded since admit  - Cr trending down from 1.5 to 1.3  - UA with trace protein  - continue monitor UOP    Endocrine  - with hypotension possible adrenal insufficiency  - will continue monitor though responding to fluids    dispo - f/u hospice consult, will pursue conservative management for possible sepsis 2/2 osteo at this time    Arslan Perez MD  PGY-3   2:21 PM

## 2018-05-07 NOTE — CONSULTS
[facility]  Podiatry  Consult Note    Patient Name: Raghav Greer  MRN: [id number]  Admission Date: 5/6/2018  Hospital Length of Stay: 1 days  Attending Physician: Severyn Yaroshevsky, MD  Primary Care Provider: Anselmo Valente MD     Inpatient consult to Podiatry  Consult performed by: FLORENTINO OH  Consult ordered by: CAROLINA ENGLISH  Reason for consult: Ulceration left foot         Subjective:     History of Present Illness:  Patient is a 89 y.o. male with severe dehydration, HFpEF, recent admission to [facility] for Osteomyeloitis of left plantar region of great toe. Pt. Nonverbal, no family present at bedside. History obtained from chart review. Pt. With osteomyelitis left foot previously treated at [facility]. PICC line for IV abx recommended along with angio due to severe PAD however patient's daughter in law declines treatment. Pt. Is DNR.     Scheduled Meds:   aspirin  325 mg Oral Daily    bimatoprost  1 drop Both Eyes QHS    ceFEPime (MAXIPIME) IVPB  2 g Intravenous Q12H    heparin (porcine)  5,000 Units Subcutaneous Q8H     Continuous Infusions:   sodium chloride 0.45% 100 mL/hr at 05/07/18 0758     PRN Meds:sodium chloride, dextrose 50%, dextrose 50%, glucagon (human recombinant), glucose, glucose, insulin aspart U-100, ondansetron, sodium chloride 0.9%    Review of patient's allergies indicates:  No Known Allergies     Past Medical History:   Diagnosis Date    CHF (congestive heart failure)     Diabetes mellitus     Hypertension      History reviewed. No pertinent surgical history.    Family History     None        Social History Main Topics    Smoking status: Unknown If Ever Smoked    Smokeless tobacco: Not on file      Comment: Pt unable to answer questions.    Alcohol use No      Comment: Pt unable to answer questions.    Drug use: Unknown      Comment: Pt unable to answer questions.    Sexual activity: Not on file      Comment: Pt unable to answer questions.     Review of  Systems   Unable to perform ROS: Patient nonverbal     Objective:     Vital Signs (Most Recent):  Temp: 96.7 °F (35.9 °C) (05/07/18 0730)  Pulse: (!) 128 (05/07/18 0845)  Resp: (!) 22 (05/07/18 0845)  BP: (!) 97/46 (05/07/18 0845)  SpO2: 100 % (05/07/18 0845) Vital Signs (24h Range):  Temp:  [96.7 °F (35.9 °C)-98.5 °F (36.9 °C)] 96.7 °F (35.9 °C)  Pulse:  [125-134] 128  Resp:  [9-26] 22  SpO2:  [100 %] 100 %  BP: ()/(34-93) 97/46     Weight: 52 kg (114 lb 10.2 oz)  Body mass index is 17.96 kg/m².    Foot Exam    General  Orientation: unable to assess       Right Foot/Ankle     Inspection and Palpation  Swelling: none     Neurovascular  Dorsalis pedis: absent  Posterior tibial: absent      Left Foot/Ankle      Inspection and Palpation  Swelling: none   Skin Exam: ulcer;     Neurovascular  Dorsalis pedis: absent  Posterior tibial: absent        Wound 1: Left lateral foot   Measurement: 0hyh6kzw7.3cm  Base: fibrogranular base   Periwound skin: callus  Drainage: none  Probe: probes to bone                 Laboratory:  CBC:   Recent Labs  Lab 05/07/18  0400   WBC 9.28   RBC 2.13*   HGB 6.6*   HCT 21.5*   PLT 97*   *   MCH 31.0   MCHC 30.7*     CMP:   Recent Labs  Lab 05/07/18  0400   GLU 90   CALCIUM 9.7   ALBUMIN 2.5*   PROT 5.0*      K 4.9   CO2 24      BUN 52*   CREATININE 1.3   ALKPHOS 54*   ALT 9*   AST 20   BILITOT 0.6       Diagnostic Results:  Xray: Findings in keeping with osteomyelitis involving the head of the 5th metacarpal and base of the 5th proximal phalanx suggesting septic arthritis at the 5th MCP joint.  Findings underlie an area of skin ulceration.    Clinical Findings:  Ulceration left lateral foot stable no purulent drainage noted.     Assessment/Plan:     * Osteomyelitis of left foot    Per previous chart review patient's family declines IV abx and angio will continue with local wound care.   Nursing orders in for daily dressing changes.   Podiatry will  follow      Offloading Device: Heel protector      Iva Conteh DPM PGY-3  Pager: [phone]          Advanced dementia    Per primary             Thank you for your consult. I will follow-up with patient. Please contact us if you have any additional questions.    Iva Conteh MD  Podiatry  [facility]

## 2018-05-07 NOTE — ASSESSMENT & PLAN NOTE
Recommendations:  Continue medical treatment  Code status: DNR  Palliative care to have family meeting today to discuss goals of care   Palliative care will continue to provide family with emotional support

## 2018-05-07 NOTE — PROGRESS NOTES
"Vancomycin Dosing and Monitoring Pharmacy Protocol    Raghav Greer is a 89 y.o. male    Height: 5' 7" (1.702 m)   Wt Readings from Last 1 Encounters:   05/06/18 52 kg (114 lb 10.2 oz)     Ideal body weight: 66.1 kg (145 lb 11.6 oz)    Temp Readings from Last 3 Encounters:   05/06/18 98.5 °F (36.9 °C) (Oral)   03/05/18 98 °F (36.7 °C) (Oral)   07/10/17 97.6 °F (36.4 °C) (Oral)      Lab Results   Component Value Date/Time    WBC 7.02 05/06/2018 02:45 PM    WBC 4.70 03/05/2018 06:59 AM    WBC 5.26 03/04/2018 08:18 AM      Lab Results   Component Value Date/Time    CREATININE 1.5 (H) 05/06/2018 02:45 PM    CREATININE 1.1 03/05/2018 07:00 AM    CREATININE 1.1 03/04/2018 08:18 AM        Serum creatinine: 1.5 mg/dL (H) 05/06/18 1445  Estimated creatinine clearance: 22.6 mL/min (A)    Antibiotics       Start     Stop Route Frequency Ordered    05/06/18 2030  ceFEPIme injection 2 g      -- IV Every 12 hours (non-standard times) 05/06/18 2028          Antifungals       None            Microbiology Results (last 7 days)       Procedure Component Value Units Date/Time    Urine culture [[id number]]     Order Status:  No result Specimen:  Urine from Urine, Clean Catch     Aerobic culture [[id number]]     Order Status:  No result Specimen:  Wound from Foot, Left     Aerobic culture [[id number]]     Order Status:  Canceled Specimen:  Wound from Foot, Left     Blood culture x two cultures. Draw prior to antibiotics. [[id number]] Collected:  05/06/18 1704    Order Status:  Sent Specimen:  Blood from Peripheral, Antecubital, Left Updated:  05/06/18 1828    Blood culture x two cultures. Draw prior to antibiotics. [[id number]] Collected:  05/06/18 1446    Order Status:  Sent Specimen:  Blood from Peripheral, Antecubital, Right Updated:  05/06/18 1543    Urine culture [[id number]]     Order Status:  Canceled Specimen:  Urine             Indication:   Bacteremia    Target Level: 15-20 mcg/ml    Hemodialysis:   No on N/A    Dosing Weight: "   ABW--actual body weight  If ABW is greater than or equal to 30% over Ideal Body Weight, AdjBW will be used to calculate vancomycin dose.    Last Vancomycin dose: 1000 mg   Date/Time given: 18 at 1700          Vancomycin level:  No results for input(s): VANCOMYCIN-TROUGH in the last 72 hours.  No results for input(s): VANCOMYCIN, RANDOM in the last 72 hours.    Per Protocol Initial/Adjustments Dosin. Initial/Adjustment Dose: vancomycin 1500 mg q24h changed to one time pulse dosing of 750 mg based on CrCl  22.6 ml/min and random levels   2. Vancomycin Random Level will be drawn on 18 st 1600 date/time    Pharmacy will continue to follow.    Please contact if you have any further questions. Thank you.    Maxi Mann, PharmD  [phone]

## 2018-05-07 NOTE — CONSULTS
"  [facility]  Adult Nutrition  Consult Note    SUMMARY     Recommendations    Recommendation/Intervention:   1. Advance diet as able per SLP to 2 gm sodium; 2000 ADA   2. If unable to advance po diet and TF desired rec   - Glucerna 1.5 initiated @ 10 ml/hr and advanced 10 ml q 12 hrs to goal rate of 50 ml/hr.   -Replete lytes and monitor K/Phos/Mg/Ca for refeeding shifts due to age, extended NPO, indicators of malnutrition; slow advancement of  to 10 ml q 24 hrs if lytes unstable.   -Rec thiamin 100 mg 3-5 days with concerns for malnutrition.   -Fluid flushes for normal fluid intake: 150 ml q 4 hrs or per MD.   -Hold TF for n/v/abd discomfort; HOB >30 (If Residuals checked, hold if >500 ml).   TF to provide: 1800 kcal, 99g pro, 911 ml fluid  3. RD to monitor    Goals: Initiate nutrition within 72 hrs or per family goals of care  Nutrition Goal Status: new  Communication of RD Recs:  (plan of care)    Reason for Assessment    Reason for Assessment: consult  Diagnosis:  (osteomyelitis)  Relevant Medical History: CHF, DM, HTN  General Information Comments: Pt and family with team during visit- unable to do physical assessment at this time. Pt. NPO; SLP unable to eval at this time. Poor po intake/dehydration PTA. Pt. BMI indicative of malnutrition.     Nutrition/Diet History    Food Preferences: REJI    Anthropometrics    Temp: 96.4 °F (35.8 °C)  Height Method: Stated  Height: 5' 7" (170.2 cm)  Height (inches): 67 in  Weight Method: Bed Scale  Weight: 52 kg (114 lb 10.2 oz)  Weight (lb): 114.64 lb  Ideal Body Weight (IBW), Male: 148 lb  % Ideal Body Weight, Male (lb): 77.46 lb  BMI (Calculated): 18  BMI Grade: 17 - 18.4 protein-energy malnutrition grade I     Lab/Procedures/Meds    Pertinent Labs Reviewed: reviewed  Pertinent Labs Comments: :A 6.2; alb 2.5  Pertinent Medications Reviewed: reviewed  Pertinent Medications Comments: IVF    Physical Findings/Assessment    Overall Physical Appearance:  " (REJI)  Oral/Mouth Cavity: tooth/teeth missing  Skin: pressure ulcer(s) (FT wound; Stage II PI)    Estimated/Assessed Needs    Weight Used For Calorie Calculations: 52 kg (114 lb 10.2 oz)  Energy Calorie Requirements (kcal): 1400 kcal (>1820 for malnutrition/weight gain)  Energy Need Method: Kcal/kg  Protein Requirements: 62g-80g  Weight Used For Protein Calculations: 52 kg (114 lb 10.2 oz)     Fluid Need Method: RDA Method  RDA Method (mL): 1400  CHO Requirement: 175g      Nutrition Prescription Ordered    Current Diet Order: NPO    Evaluation of Received Nutrient/Fluid Intake    IV Fluid (mL): 2400  I/O: 706/285  Energy Calories Required: not meeting needs  Protein Required: not meeting needs  Fluid Required: meeting needs    Nutrition Risk    Level of Risk/Frequency of Follow-up:  (2 x week)     Assessment and Plan    Nutrition Dx: Inadequate energy intake r/t somnolence as evidenced by NPO  Nutrition Dx Status: New       Monitor and Evaluation    Food and Nutrient Intake: energy intake, food and beverage intake, enteral nutrition intake  Food and Nutrient Adminstration: diet order, enteral and parenteral nutrition administration  Knowledge/Beliefs/Attitudes: food and nutrition knowledge/skill  Physical Activity and Function: nutrition-related ADLs and IADLs  Anthropometric Measurements: weight, weight change  Biochemical Data, Medical Tests and Procedures: electrolyte and renal panel, glucose/endocrine profile  Nutrition-Focused Physical Findings: overall appearance     Nutrition Follow-Up    RD Follow-up?: Yes

## 2018-05-07 NOTE — PROGRESS NOTES
[facility]  Palliative Medicine  Progress Note      Thank you for opportunity to participate in Ms. Greer's care.         Patient Name: Raghav Greer  MRN: [id number]  Admission Date: 5/6/2018  Hospital Length of Stay: 1 days  Code Status: DNR   Attending Provider: Severyn Yaroshevsky, MD  Consulting Provider: Mary Patrick MD  Primary Care Physician: Anselmo Valente MD  Principal Problem:Osteomyelitis of left foot    Patient information was obtained from relative(s) and ER records.      Assessment/Plan:     Counseling regarding advanced care planning and goals of care    Recommendations:  Continue medical treatment  Code status: DNR  Palliative care to have family meeting today to discuss goals of care   Palliative care will continue to provide family with emotional support         Palliative care encounter    Recommendations:  Continue medical treatment  Code status: DNR  Palliative care to have family meeting today to discuss goals of care   Palliative care will continue to provide family with emotional support             continue to FU    Subjective:     Chief Complaint:   Chief Complaint   Patient presents with    Fatigue     family called stating patient had moment of weakness where patient was not responding very well, EMS reports he was hypotensive upon their arrival but improved en route. family states patient is a DNR       HPI:   Patient is a 89 y.o. male with a previous admission to this service with severe dehydration, HFpEF, recent admission to [facility] for Osteomyeloitis of left plantar region of great toe, HTN, presents to the ED because daughter in law states that when she attempted to feed him his eyes rolled in the back of his head and she thought he was dying. On presentation to the ED patient was lethargic, hypotensive and noted to have chronic unchanged wound on left plantar aspect of foot.   Patient received appropriate fluid resuscitation for SEPSIS and was  started on broad spectrum abx.   Chart review from [facility] shows that patient was being treated for suspected Osteo with PICC line and abx, patient's daughter in law refused abx via PICC and was discharged by inpatient team at [facility] with po abx.   According to [facility] noted for Osteo, patient has sever PAD located in the internal illiac region due to high cardio pulmonary risk again daughter decided to see how well he would do with non invasive treatment. Angio was not done therefore no surgery on osteo location was done.     Palliative care has been consulted for goals of care discussion/advance care planning    Hospital Course:  No notes on file    Interval History: Palliative care met with patient awake and alert. Grandson-Romulo by bedside stated patient responded this morning by pooping eyes open to speak with his daughter Tania over the phone.   Romulo stated patient lives with his son Terrance (SOHAIL) and daughter in law and  at baseline is wheelchair bound and needed assistance with all ADL to include transfer from bed to wheelchair and vice versa. Patient is verbal some at baseline and had stated he does not want amputation and reaffirmed DNR status.   Primary team came into the room and discussed plan of care with Romulo to include IVF for hypotension, blood transfusion for low H/H and podiatry consult for left foot infection. Romulo stated ok to continue with treatment.  Palliative care discussed with Romulo for a possible family meeting with patient's son Terrance (SOHAIL) later today to discuss goals of care. Emotional comfort provided.           Past Medical History:   Diagnosis Date    CHF (congestive heart failure)     Diabetes mellitus     Hypertension        History reviewed. No pertinent surgical history.    Review of patient's allergies indicates:  No Known Allergies    Medications:  Continuous Infusions:   sodium chloride 0.45% 100 mL/hr at 05/07/18 3918     Scheduled Meds:   aspirin  325 mg Oral Daily     bimatoprost  1 drop Both Eyes QHS    ceFEPime (MAXIPIME) IVPB  2 g Intravenous Q12H    heparin (porcine)  5,000 Units Subcutaneous Q8H     PRN Meds:sodium chloride, dextrose 50%, dextrose 50%, glucagon (human recombinant), glucose, glucose, insulin aspart U-100, ondansetron, sodium chloride 0.9%    Family History     None        Social History Main Topics    Smoking status: Unknown If Ever Smoked    Smokeless tobacco: Not on file      Comment: Pt unable to answer questions.    Alcohol use No      Comment: Pt unable to answer questions.    Drug use: Unknown      Comment: Pt unable to answer questions.    Sexual activity: Not on file      Comment: Pt unable to answer questions.       Review of Systems   Unable to perform ROS: Patient nonverbal     Objective:     Vital Signs (Most Recent):  Temp: 96.7 °F (35.9 °C) (05/07/18 0730)  Pulse: (!) 128 (05/07/18 0745)  Resp: (!) 24 (05/07/18 0745)  BP: (!) 73/42 (05/07/18 0745)  SpO2: 100 % (05/07/18 0745) Vital Signs (24h Range):  Temp:  [96.7 °F (35.9 °C)-98.5 °F (36.9 °C)] 96.7 °F (35.9 °C)  Pulse:  [125-134] 128  Resp:  [9-26] 24  SpO2:  [100 %] 100 %  BP: ()/(40-93) 73/42     Weight: 52 kg (114 lb 10.2 oz)  Body mass index is 17.96 kg/m².    Review of Symptoms  Symptom Assessment (ESAS 0-10 scale)   ESAS 0 1 2 3 4 5 6 7 8 9 10   Pain              Dyspnea              Anxiety              Nausea              Depression               Anorexia              Fatigue              Insomnia              Restlessness               Agitation              CAM / Delirium __ --  ___+   Constipation     __ --  ___+   Diarrhea           __ --  ___+  Bowel Management Plan (BMP): No      Performance Status: 30    ECOG Performance Status Grade: 4 - Completely disabled    Physical Exam   Constitutional: He appears well-developed.   Cachetic, pale   HENT:   Head: Normocephalic and atraumatic.   Eyes: EOM are normal. Pupils are equal, round, and reactive to light.   Neck: Normal  range of motion. Neck supple. No tracheal deviation present.   Cardiovascular: Regular rhythm.  Tachycardia present.    HR in the 120s   Pulmonary/Chest: Breath sounds normal. He has no wheezes. He has no rales.   Abdominal: Soft. Bowel sounds are normal. There is no tenderness.   Musculoskeletal: He exhibits edema.   L foot wound    Neurological: He is alert.   Mumbles words. Grunt to questions.    Skin: Skin is warm.   Sacral wound with dressing clean and dry       Significant Labs:   BMP:   Recent Labs  Lab 05/07/18  0400   GLU 90      K 4.9      CO2 24   BUN 52*   CREATININE 1.3   CALCIUM 9.7   MG 1.9     CBC:   Recent Labs  Lab 05/06/18  1445 05/07/18  0400   WBC 7.02 9.28   HGB 8.2* 6.6*   HCT 27.0* 21.5*   * 97*     Lactic acid:   Recent Labs  Lab 05/06/18  1445 05/06/18  1838   LACTATE 8.5* 6.2*     LFT:   Recent Labs  Lab 05/07/18  0400   AST 20   ALKPHOS 54*   BILITOT 0.6     CBC:     Recent Labs  Lab 05/07/18  0400   WBC 9.28   HGB 6.6*   HCT 21.5*   *   PLT 97*     BMP:    Recent Labs  Lab 05/07/18  0400   GLU 90      K 4.9      CO2 24   BUN 52*   CREATININE 1.3   CALCIUM 9.7   MG 1.9     LFT:  Lab Results   Component Value Date    AST 20 05/07/2018    ALKPHOS 54 (L) 05/07/2018    BILITOT 0.6 05/07/2018     Albumin:   Albumin   Date Value Ref Range Status   05/07/2018 2.5 (L) 3.5 - 5.2 g/dL Final     Protein:   Total Protein   Date Value Ref Range Status   05/07/2018 5.0 (L) 6.0 - 8.4 g/dL Final     Lactic acid:   Lab Results   Component Value Date    LACTATE 6.2 (HH) 05/06/2018    LACTATE 8.5 (HH) 05/06/2018       Significant Imaging: see h&p    Advanced Directives::  Living Will: No  LaPOST: Yes  Do Not Resuscitate Status: Yes  Medical Power of : Yes. Agent's Name: Terrance.     Decision-Making Capacity: Family answered questions, Patient unable to communicate due to disease severity/cognitive impairment    Living Arrangements: Lives with family                   Thank you for this consult and the opportunity to participate in Ms. Greer's care    > 50% of 90 min visit spent in chart review, face to face discussion of goals of care,  symptom assessment, coordination of care and emotional support.    Mary Patrick MD  Palliative Medicine  [facility]  [phone]

## 2018-05-07 NOTE — PROGRESS NOTES
05/07/18 0720   Vital Signs   Pulse (!) 129   Resp (!) 22   SpO2 100 %   BP (!) 72/46   MAP (mmHg) 55   Dr Perez at bedside; BP still low, patient still somnolent. MD to place orders.   Patient's family notified.

## 2018-05-07 NOTE — PLAN OF CARE
rounded on patient. Daughter in law Tiffanie and grandson at bedside.  provided Tiffanie with 's business card and left contact info on white board in pt's room. DIL voiced no questions at this time. Will continue to follow for needs.

## 2018-05-08 LAB
ALBUMIN SERPL BCP-MCNC: 2.6 G/DL
ALP SERPL-CCNC: 57 U/L
ALT SERPL W/O P-5'-P-CCNC: 16 U/L
ANION GAP SERPL CALC-SCNC: 17 MMOL/L
AST SERPL-CCNC: 43 U/L
BACTERIA UR CULT: NO GROWTH
BASOPHILS # BLD AUTO: 0.01 K/UL
BASOPHILS NFR BLD: 0 %
BILIRUB SERPL-MCNC: 2 MG/DL
BUN SERPL-MCNC: 72 MG/DL
CALCIUM SERPL-MCNC: 10 MG/DL
CHLORIDE SERPL-SCNC: 106 MMOL/L
CO2 SERPL-SCNC: 18 MMOL/L
CREAT SERPL-MCNC: 1.7 MG/DL
CRP SERPL-MCNC: 18.2 MG/L
DIFFERENTIAL METHOD: ABNORMAL
EOSINOPHIL # BLD AUTO: 0 K/UL
EOSINOPHIL NFR BLD: 0 %
ERYTHROCYTE [DISTWIDTH] IN BLOOD BY AUTOMATED COUNT: 17.9 %
ERYTHROCYTE [SEDIMENTATION RATE] IN BLOOD BY WESTERGREN METHOD: 5 MM/HR
EST. GFR  (AFRICAN AMERICAN): 39 ML/MIN/1.73 M^2
EST. GFR  (NON AFRICAN AMERICAN): 34 ML/MIN/1.73 M^2
GLUCOSE SERPL-MCNC: 89 MG/DL
HCT VFR BLD AUTO: 25 %
HGB BLD-MCNC: 8.1 G/DL
LACTATE SERPL-SCNC: 4 MMOL/L
LACTATE SERPL-SCNC: 5.7 MMOL/L
LACTATE SERPL-SCNC: 6.9 MMOL/L
LACTATE SERPL-SCNC: 7.8 MMOL/L
LYMPHOCYTES # BLD AUTO: 1.1 K/UL
LYMPHOCYTES NFR BLD: 4.9 %
MAGNESIUM SERPL-MCNC: 2 MG/DL
MCH RBC QN AUTO: 31.3 PG
MCHC RBC AUTO-ENTMCNC: 32.4 G/DL
MCV RBC AUTO: 97 FL
MONOCYTES # BLD AUTO: 1 K/UL
MONOCYTES NFR BLD: 4.8 %
NEUTROPHILS # BLD AUTO: 19.6 K/UL
NEUTROPHILS NFR BLD: 89.8 %
PHOSPHATE SERPL-MCNC: 4 MG/DL
PLATELET # BLD AUTO: 101 K/UL
PMV BLD AUTO: 11.7 FL
POCT GLUCOSE: 143 MG/DL (ref 70–110)
POCT GLUCOSE: 93 MG/DL (ref 70–110)
POCT GLUCOSE: 94 MG/DL (ref 70–110)
POTASSIUM SERPL-SCNC: 4.9 MMOL/L
PROT SERPL-MCNC: 5.1 G/DL
RBC # BLD AUTO: 2.59 M/UL
SODIUM SERPL-SCNC: 141 MMOL/L
TROPONIN I SERPL DL<=0.01 NG/ML-MCNC: 1.86 NG/ML
TROPONIN I SERPL DL<=0.01 NG/ML-MCNC: 1.99 NG/ML
VANCOMYCIN SERPL-MCNC: 12.9 UG/ML
WBC # BLD AUTO: 21.8 K/UL

## 2018-05-08 PROCEDURE — 63600175 PHARM REV CODE 636 W HCPCS: Performed by: FAMILY MEDICINE

## 2018-05-08 PROCEDURE — 80202 ASSAY OF VANCOMYCIN: CPT

## 2018-05-08 PROCEDURE — 84100 ASSAY OF PHOSPHORUS: CPT

## 2018-05-08 PROCEDURE — 99232 SBSQ HOSP IP/OBS MODERATE 35: CPT | Mod: ,,, | Performed by: NURSE PRACTITIONER

## 2018-05-08 PROCEDURE — 83605 ASSAY OF LACTIC ACID: CPT | Mod: 91

## 2018-05-08 PROCEDURE — 80053 COMPREHEN METABOLIC PANEL: CPT

## 2018-05-08 PROCEDURE — 25000003 PHARM REV CODE 250: Performed by: FAMILY MEDICINE

## 2018-05-08 PROCEDURE — 93005 ELECTROCARDIOGRAM TRACING: CPT

## 2018-05-08 PROCEDURE — 85652 RBC SED RATE AUTOMATED: CPT

## 2018-05-08 PROCEDURE — 83735 ASSAY OF MAGNESIUM: CPT

## 2018-05-08 PROCEDURE — 85025 COMPLETE CBC W/AUTO DIFF WBC: CPT

## 2018-05-08 PROCEDURE — 36415 COLL VENOUS BLD VENIPUNCTURE: CPT

## 2018-05-08 PROCEDURE — 93010 ELECTROCARDIOGRAM REPORT: CPT | Mod: 76,,, | Performed by: INTERNAL MEDICINE

## 2018-05-08 PROCEDURE — S5010 5% DEXTROSE AND 0.45% SALINE: HCPCS | Performed by: FAMILY MEDICINE

## 2018-05-08 PROCEDURE — 84484 ASSAY OF TROPONIN QUANT: CPT | Mod: 91

## 2018-05-08 PROCEDURE — 94761 N-INVAS EAR/PLS OXIMETRY MLT: CPT

## 2018-05-08 PROCEDURE — 86140 C-REACTIVE PROTEIN: CPT

## 2018-05-08 PROCEDURE — 20000000 HC ICU ROOM

## 2018-05-08 PROCEDURE — 27000221 HC OXYGEN, UP TO 24 HOURS

## 2018-05-08 RX ORDER — DEXTROSE MONOHYDRATE AND SODIUM CHLORIDE 5; .45 G/100ML; G/100ML
INJECTION, SOLUTION INTRAVENOUS CONTINUOUS
Status: ACTIVE | OUTPATIENT
Start: 2018-05-08 | End: 2018-05-09

## 2018-05-08 RX ADMIN — ASPIRIN 325 MG ORAL TABLET 325 MG: 325 PILL ORAL at 08:05

## 2018-05-08 RX ADMIN — BIMATOPROST 1 DROP: 0.1 SOLUTION/ DROPS OPHTHALMIC at 09:05

## 2018-05-08 RX ADMIN — DEXTROSE AND SODIUM CHLORIDE: 5; .45 INJECTION, SOLUTION INTRAVENOUS at 08:05

## 2018-05-08 RX ADMIN — HEPARIN SODIUM 5000 UNITS: 5000 INJECTION, SOLUTION INTRAVENOUS; SUBCUTANEOUS at 03:05

## 2018-05-08 RX ADMIN — HEPARIN SODIUM 5000 UNITS: 5000 INJECTION, SOLUTION INTRAVENOUS; SUBCUTANEOUS at 09:05

## 2018-05-08 RX ADMIN — VANCOMYCIN HYDROCHLORIDE 750 MG: 750 INJECTION, POWDER, LYOPHILIZED, FOR SOLUTION INTRAVENOUS at 09:05

## 2018-05-08 RX ADMIN — CEFEPIME 2 G: 2 INJECTION, POWDER, FOR SOLUTION INTRAVENOUS at 08:05

## 2018-05-08 RX ADMIN — HEPARIN SODIUM 5000 UNITS: 5000 INJECTION, SOLUTION INTRAVENOUS; SUBCUTANEOUS at 05:05

## 2018-05-08 NOTE — PLAN OF CARE
Spoke to Tiffanie Greer - patients daughter in law [phone]  Patient lives at home with son and daughter in law  Has all DME equipment (hospital bed)    Informed her that palliative care nurse is attempting to contact her and  to set up meeting. She is open to speaking with palliative care team to meet. Called LEORA Benjamin with palliative care and she will call Tiffanie to set up palliative care meeting to discuss goals of care.    Per Cassidy Brown NP with Palliative Care Meeting tomorrow at 9am with family.    [facility] has [facility] 9 hours a week.  Uses [employer] for nurse- wound care       05/08/18 4284   Discharge Assessment   Assessment Type Discharge Planning Assessment   Confirmed/corrected address and phone number on facesheet? Yes   Assessment information obtained from? Caregiver   Lives With child(debbie), adult   Able to Return to Prior Arrangements unable to determine at this time (comments)   Is patient able to care for self after discharge? No   Patient's perception of discharge disposition home or selfcare;home health   Equipment Currently Used at Home hospital bed   Do you have any problems affording any of your prescribed medications? No   Is the patient taking medications as prescribed? no   Discharge Plan A Home;Home with family;Home Health   Patient/Family In Agreement With Plan yes     Veronique Jameson, RN, CCM, CMSRN  RN Transition Navigator  [phone]

## 2018-05-08 NOTE — PLAN OF CARE
Problem: Patient Care Overview  Goal: Plan of Care Review  Outcome: Ongoing (interventions implemented as appropriate)  POC reviewed with pt's daughter in law. Questions answered at this time. Daughter in law verbalized understanding.

## 2018-05-08 NOTE — HOSPITAL COURSE
: Palliative care met with patient awake and alert. Grandson-Romulo by bedside stated patient responded this morning by pooping eyes open to speak with his daughter Tania over the phone.   Romulo stated patient lives with his son Terrance (SOHAIL) and daughter in law and  at baseline is wheelchair bound and needed assistance with all ADL to include transfer from bed to wheelchair and vice versa. Patient is verbal some at baseline and had stated he does not want amputation and reaffirmed DNR status.   Primary team came into the room and discussed plan of care with Romulo to include IVF for hypotension, blood transfusion for low H/H and podiatry consult for left foot infection. Romulo stated ok to continue with treatment.  Palliative care discussed with Romulo for a possible family meeting with patient's son Terrance (SOHAIL) later today to discuss goals of care. Emotional comfort provided.

## 2018-05-08 NOTE — PROGRESS NOTES
"Vancomycin Dosing and Monitoring Pharmacy Protocol    Raghav Greer is a 89 y.o. male    Height: 5' 7" (1.702 m)   Wt Readings from Last 1 Encounters:   05/07/18 52 kg (114 lb 10.2 oz)     Ideal body weight: 66.1 kg (145 lb 11.6 oz)    Temp Readings from Last 3 Encounters:   05/07/18 96.8 °F (36 °C) (Axillary)   03/05/18 98 °F (36.7 °C) (Oral)   07/10/17 97.6 °F (36.4 °C) (Oral)      Lab Results   Component Value Date/Time    WBC 9.28 05/07/2018 04:00 AM    WBC 7.02 05/06/2018 02:45 PM    WBC 4.70 03/05/2018 06:59 AM      Lab Results   Component Value Date/Time    CREATININE 1.3 05/07/2018 04:00 AM    CREATININE 1.5 (H) 05/06/2018 02:45 PM    CREATININE 1.1 03/05/2018 07:00 AM        Serum creatinine: 1.3 mg/dL 05/07/18 0400  Estimated creatinine clearance: 26.1 mL/min    Antibiotics       Start     Stop Route Frequency Ordered    05/08/18 0830  ceFEPIme injection 2 g      -- IV Every 24 hours (non-standard times) 05/07/18 1556    05/07/18 2300  vancomycin 750 mg in dextrose 5 % 250 mL IVPB (ready to mix system)      -- IV Once 05/07/18 2202          Antifungals       None            Microbiology Results (last 7 days)       Procedure Component Value Units Date/Time    Blood culture x two cultures. Draw prior to antibiotics. [[id number]] Collected:  05/06/18 1704    Order Status:  Completed Specimen:  Blood from Peripheral, Antecubital, Left Updated:  05/07/18 2012     Blood Culture, Routine No Growth to date     Blood Culture, Routine No Growth to date    Narrative:       Aerobic and anaerobic    Blood culture x two cultures. Draw prior to antibiotics. [[id number]] Collected:  05/06/18 1446    Order Status:  Completed Specimen:  Blood from Peripheral, Antecubital, Right Updated:  05/07/18 1812     Blood Culture, Routine No Growth to date     Blood Culture, Routine No Growth to date    Narrative:       Aerobic and anaerobic    Urine culture [[id number]] Collected:  05/06/18 2115    Order Status:  Sent Specimen:  Urine " from Urine, Clean Catch Updated:  18 0056    Aerobic culture [[id number]]     Order Status:  Canceled Specimen:  Wound from Foot, Left     Aerobic culture [[id number]]     Order Status:  Canceled Specimen:  Wound from Foot, Left     Urine culture [[id number]]     Order Status:  Canceled Specimen:  Urine             Indication:   Bacteremia    Target Level: 15-20 mcg/ml    Hemodialysis:   No on N/A    Dosing Weight:   ABW--actual body weight  If ABW is greater than or equal to 30% over Ideal Body Weight, AdjBW will be used to calculate vancomycin dose.    Last Vancomycin dose: 1000 mg   Date/Time given: 18 at 1719          Vancomycin level:  No results for input(s): VANCOMYCIN-TROUGH in the last 72 hours.  Recent Labs   Lab Result Units  18   2059   Vancomycin, Random ug/mL  6.0       Per Protocol Initial/Adjustments Dosin. Initial/Adjustment Dose: Vancomycin 750 mg IV X 1 dose today   2. Vancomycin Random Level will be drawn on 18 at 2100 date/time    Pharmacy will continue to follow.    Please contact if you have any further questions. Thank you.    Migue Rose, PharmD  [phone]

## 2018-05-08 NOTE — HPI
Patient is a 89 y.o. 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 On presentation to the ED patient was lethargic, hypotensive and noted to have chronic unchanged wound on left plantar aspect of foot.   Patient received appropriate fluid resuscitation for SEPSIS and was started on broad spectrum abx.   Chart review from [facility] shows that patient was being treated for suspected Osteo with PICC line and abx, patient's daughter in law refused abx via PICC and was discharged by inpatient team at [facility] with po abx.   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 Angio was not done therefore no surgery on osteo location was done.     Palliative care has been consulted for goals of care discussion/advance care planning

## 2018-05-08 NOTE — PT/OT/SLP PROGRESS
Physical Therapy      Patient Name:  Raghav Greer   MRN:  [id number]    Patient not seen today secondary to  Nursing hold.  Per RN, pt is not medically appropriate for therapy at this time. Will follow-up.    Jenelle Thrasher, PT

## 2018-05-08 NOTE — PT/OT/SLP PROGRESS
Occupational Therapy      Patient Name:  Raghav Greer   MRN:  [id number]    Patient not seen today secondary to  Nursing hold.  Per RN, pt is not medically appropriate for therapy at this time. Will follow-up.      Bc Gray OT  5/8/2018

## 2018-05-08 NOTE — PROGRESS NOTES
Palliative Care Daily Progress Note     Palliative care into f/u visit with Mr. Greer. He is lethargic but answers to his name. Patient grimacing and mumbling some words. No family at the bedside at this time. Awaiting for return phone call from family. Dawson [phone] (POA)      2:30p-Palliative care returned to patient room to meet with family. No family at bedside. No one has returned phone call to palliative care.     3:45p Attempts to call family Wallace (son and POA) [phone]. Palliative care to patient room. No family at bedside.     Palliative care will continue to follow the patient and family to assist with goals of care.     Thank you for the opportunity to participate in Mr. Greer's care.      Recommendations  Continue current medical treatment  Code Status: DNR      Palliative care will continue to follow to assist with goals of care.           Time Spent:> 50% of  60 min. in visit spent in chart review, phone discussion of goals of care with family, symptom assessment, coordination of care and emotional support           Cassidy Brown, MSN, APRN, NP-C  Palliative  Medicine

## 2018-05-08 NOTE — PT/OT/SLP PROGRESS
Speech Language Pathology  Attempted Visit    Raghav Greer  MRN: [id number]    SLP attempted to see pt this AM. However, patient not seen today secondary to Nursing hold. Per RN, pt is not medically appropriate for ST services at this time. SLP will follow-up next date.    LUIS ARMANDO Rincon., CF-SLP  Speech-Language Pathologist   5/8/2018

## 2018-05-08 NOTE — PROGRESS NOTES
Progress Note    Admit Date: 5/6/2018   LOS: 2 days     SUBJECTIVE:     Patient lethargic but arousable. Denies pain. Wax and wane type of responses with different medical personnel.     Scheduled Meds:   aspirin  325 mg Oral Daily    bimatoprost  1 drop Both Eyes QHS    ceFEPime (MAXIPIME) IVPB  2 g Intravenous Q24H    heparin (porcine)  5,000 Units Subcutaneous Q8H     Continuous Infusions:   dextrose 5 % and 0.45 % NaCl 100 mL/hr at 05/08/18 1000     PRN Meds:sodium chloride, dextrose 50%, dextrose 50%, glucagon (human recombinant), glucose, glucose, insulin aspart U-100, ondansetron, sodium chloride 0.9%    Review of patient's allergies indicates:  No Known Allergies    Review of Systems  Unable to perform    OBJECTIVE:     Vital Signs (Most Recent)  Temp: 97.3 °F (36.3 °C) (05/08/18 0745)  Pulse: (!) 128 (05/08/18 1000)  Resp: 19 (05/08/18 1000)  BP: 122/60 (05/08/18 1000)  SpO2: 100 % (05/08/18 1000)    Vital Signs Range (Last 24H):  Temp:  [96.4 °F (35.8 °C)-97.9 °F (36.6 °C)]   Pulse:  [112-134]   Resp:  [19-34]   BP: ()/(33-81)   SpO2:  [98 %-100 %]     I & O (Last 24H):    Intake/Output Summary (Last 24 hours) at 05/08/18 1045  Last data filed at 05/08/18 1000   Gross per 24 hour   Intake          1658.67 ml   Output              620 ml   Net          1038.67 ml     Physical Exam:  Gen: no acute distress  HEENT: no nasal drainage  CV: S1S2, tachycardic, no m/g/r  Resp: CTA BL, no acute respiratory distress  Ab: soft, NTND  Ext: no edema    Laboratory:  CBC:     Recent Labs  Lab 05/08/18  0336   WBC 21.80*   RBC 2.59*   HGB 8.1*   HCT 25.0*   *   MCV 97   MCH 31.3*   MCHC 32.4     CMP:     Recent Labs  Lab 05/08/18  0336   GLU 89   CALCIUM 10.0   ALBUMIN 2.6*   PROT 5.1*      K 4.9   CO2 18*      BUN 72*   CREATININE 1.7*   ALKPHOS 57   ALT 16   AST 43*   BILITOT 2.0*     Cardiac markers:     Recent Labs  Lab 05/06/18  1445   TROPONINI 0.063*     Microbiology Results (last 7  days)     Procedure Component Value Units Date/Time    Urine culture [[id number]] Collected:  05/06/18 2115    Order Status:  Completed Specimen:  Urine from Urine, Clean Catch Updated:  05/08/18 0338     Urine Culture, Routine No growth    Blood culture x two cultures. Draw prior to antibiotics. [[id number]] Collected:  05/06/18 1704    Order Status:  Completed Specimen:  Blood from Peripheral, Antecubital, Left Updated:  05/07/18 2012     Blood Culture, Routine No Growth to date     Blood Culture, Routine No Growth to date    Narrative:       Aerobic and anaerobic    Blood culture x two cultures. Draw prior to antibiotics. [[id number]] Collected:  05/06/18 1446    Order Status:  Completed Specimen:  Blood from Peripheral, Antecubital, Right Updated:  05/07/18 1812     Blood Culture, Routine No Growth to date     Blood Culture, Routine No Growth to date    Narrative:       Aerobic and anaerobic    Aerobic culture [[id number]]     Order Status:  Canceled Specimen:  Wound from Foot, Left     Aerobic culture [[id number]]     Order Status:  Canceled Specimen:  Wound from Foot, Left     Urine culture [[id number]]     Order Status:  Canceled Specimen:  Urine           ASSESSMENT/PLAN:     89 yo M hx of HFpEF, cachexia, HTN and recent admission for osteomyelitis and severe dehydration comes in for sepsis. Patient is DNR/DNI    Neuro  - Baseline dementia. No focal deficits, no slurring noted. Low concerns for acute CVA    CVS  - initially hypotensive suspect from severe dehydration vs sepsis. Poor nutritional intake. Improve with IVF  - Now normotensive however continues to be tachycardic, 130s. Suspect from sepsis  - Serial EKG/Trop x2  - Holding home BP meds    Pulm  - No acute respiratory distress. CXR negative on admit    Heme/ID  - s/p 1 PRBC with H/H that melina appropriately  - Suspect osteomyelitis on L foot. Podiatry on board. Family does not want to pursue further intervention other than ABx. Currently on vancomycin,  cefepime  - Vancomycin dosing by pharmacy  - CRP, ESR elevated on admit. Checking daily  - LA 8 -> 6.2. Trending LA. Will bolus if persistently elevated  - Leukocytosis, new today, likely from PRBC vs delayed infection. Left shift noted  - BCx NGTD, UCx NGTD, WCx NGTD  - Palliative care on board. Plan for meeting today    Renal  - Initially Cr improved with IVF however worsened today  - BUN/Cr worsen today with worsening acidosis suspected from JEREMY from sepsis  - HAGMA - likely 2/2 lactic acidosis from osteomyelitis, uremia from JEREMY  -  ml (0.4 ml/kg) past 24 hours. +2L since admit    FEN/GI  - D5 1/2 NS @ 100 cc/hr  - NPO  - Thrombocytopenia, elevated INR likely 2/2 sepsis vs liver disease. Hypoalbuminemia  - Elevated TBili today likely from sepsis  - Patient poor nutritional intake. SLP on board. Family does not want PEG at this point    Endocrine  - Hypotension resolved with IVF. Consider adrenal insufficiency if hypotension persists  - BG stable. Goal 140-180    Lines  - PIV left and right antecubital, frias catheter    PPx  - Heparin  - PT/OT    Dispo  - pending palliative care discussion today with family regarding plan of care    Wallace Phillips MD  [employer]

## 2018-05-08 NOTE — CONSULTS
[facility]  Palliative Medicine  Consult Note    Thank you for opportunity to participate in Ms. Greer's care.    Patient Name: Raghav Greer  MRN: [id number]  Admission Date: 5/6/2018  Hospital Length of Stay: 1 days  Code Status: DNR   Attending Provider: Severyn Yaroshevsky, MD  Consulting Provider: Mary Patrick MD  Primary Care Physician: Anselmo Valente MD  Principal Problem:Osteomyelitis of left foot    Patient information was obtained from relative(s) and ER records.      Palliative Care  Consult performed by: MARY PATRICK.  Consult ordered by: CAROLINA ENGLISH  Reason for consult: goals of care discussion/advance care planning  Assessment/Recommendations: Continue medical treatment  Code status: DNR  Palliative care to have family meeting today to discuss goals of care. Grandson will call when Terrance (MPOA) is available- left my cell phone contact  Palliative care will continue to provide family with emotional support             Assessment/Plan:     Counseling regarding advanced care planning and goals of care    Recommendations:  Continue medical treatment  Code status: DNR  Palliative care to have family meeting today to discuss goals of care   Palliative care will continue to provide family with emotional support         Palliative care encounter    Recommendations:  Continue medical treatment  Code status: DNR  Palliative care to have family meeting today to discuss goals of care   Palliative care will continue to provide family with emotional support             Thank you for your consult. Pallaitive care will continue to follow up with patient and family to assist in goals of care discussion.     Subjective:     HPI:   Patient is a 89 y.o. 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 On presentation to the ED patient was lethargic, hypotensive and noted to have chronic unchanged wound on left plantar aspect of foot.   Patient received appropriate fluid resuscitation for SEPSIS and was started on broad spectrum abx.   Chart review from [facility] shows that patient was being treated for suspected Osteo with PICC line and abx, patient's daughter in law refused abx via PICC and was discharged by inpatient team at [facility] with po abx.   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 Angio was not done therefore no surgery on osteo location was done.     Palliative care has been consulted for goals of care discussion/advance care planning    Hospital Course:  No notes on file    Interval History:   Palliative care met with patient awake and alert. Wyatt by bedside stated patient responded this morning by pooping eyes open to speak with his daughter Tania over the phone.   Romulo stated patient lives with his son Terrance (SOHAIL) and daughter in law and  at baseline is wheelchair bound and needed assistance with all ADL to include transfer from bed to wheelchair and vice versa. Patient is verbal some at baseline and had stated he does not want amputation and reaffirmed DNR status.   Primary team came into the room and discussed plan of care with Romulo to include IVF for hypotension, blood transfusion for low H/H and podiatry consult for left foot infection. Romulo stated ok to continue with treatment.  Palliative care discussed with Romulo for a possible family meeting with patient's son Terrance (SOHAIL) later today to discuss goals of care. Emotional comfort provided.            Medications:  Continuous Infusions:   sodium chloride 0.45% 100 mL/hr at 05/07/18 0758     Scheduled  Meds:   aspirin  325 mg Oral Daily    bimatoprost  1 drop Both Eyes QHS    ceFEPime (MAXIPIME) IVPB  2 g Intravenous Q12H    heparin (porcine)  5,000 Units Subcutaneous Q8H     PRN Meds:sodium chloride, dextrose 50%, dextrose 50%, glucagon (human recombinant), glucose, glucose, insulin aspart U-100, ondansetron, sodium chloride 0.9%    Objective:     Vital Signs (Most Recent):  Temp: 97.2 °F (36.2 °C) (05/07/18 1530)  Pulse: (!) 115 (05/07/18 1530)  Resp: (!) 34 (05/07/18 1530)  BP: (!) 115/57 (05/07/18 1530)  SpO2: 100 % (05/07/18 1530) Vital Signs (24h Range):  Temp:  [96.4 °F (35.8 °C)-98.5 °F (36.9 °C)] 97.2 °F (36.2 °C)  Pulse:  [112-134] 115  Resp:  [9-34] 34  SpO2:  [98 %-100 %] 100 %  BP: ()/(33-93) 115/57     Weight: 52 kg (114 lb 10.2 oz)  Body mass index is 17.96 kg/m².    Review of Symptoms  Symptom Assessment (ESAS 0-10 scale)  ESAS 0 1 2 3 4 5 6 7 8 9 10   Pain              Dyspnea              Anxiety              Nausea              Depression               Anorexia              Fatigue              Insomnia              Restlessness               Agitation              CAM / Delirium __ --  ___+   Constipation     __ --  ___+   Diarrhea           __ --  ___+  Bowel Management Plan (BMP): No      Pain Assessment: unable to assess    Performance Status: 30    ECOG Performance Status Grade: 4 - Completely disabled    Physical Exam   Constitutional: He appears well-developed.   Cachetic, pale    HENT:   Head: Normocephalic and atraumatic.   Eyes: EOM are normal. Pupils are equal, round, and reactive to light.   Neck: Normal range of motion. Neck supple.   Cardiovascular: Tachycardia present.    HR in the 120s    Pulmonary/Chest: Effort normal. He has no wheezes. He has no rales.   Abdominal: Soft. Bowel sounds are normal. He exhibits no distension. There is no tenderness.   Musculoskeletal:   L foot wound     Neurological: He is alert. He displays abnormal reflex.   Mumbles words. Grunt to  questions.    Skin: Skin is warm.   Sacral wound with dressing clean and dry        Significant Labs:   CBC:   Recent Labs  Lab 05/06/18  1445 05/07/18  0400   WBC 7.02 9.28   HGB 8.2* 6.6*   HCT 27.0* 21.5*   * 97*     CMP:   Recent Labs  Lab 05/06/18  1445 05/07/18  0400    143   K 4.5 4.9    109   CO2 28 24   * 90   BUN 49* 52*   CREATININE 1.5* 1.3   CALCIUM 10.3 9.7   PROT 5.4* 5.0*   ALBUMIN 2.6* 2.5*   BILITOT 0.7 0.6   ALKPHOS 64 54*   AST 11 20   ALT 9* 9*   ANIONGAP 11 10   EGFRNONAA 40* 47*     Prealbumin: No results for input(s): PREALBUMIN in the last 48 hours.  CBC:     Recent Labs  Lab 05/07/18  0400   WBC 9.28   HGB 6.6*   HCT 21.5*   *   PLT 97*     BMP:    Recent Labs  Lab 05/07/18  0400   GLU 90      K 4.9      CO2 24   BUN 52*   CREATININE 1.3   CALCIUM 9.7   MG 1.9     LFT:  Lab Results   Component Value Date    AST 20 05/07/2018    ALKPHOS 54 (L) 05/07/2018    BILITOT 0.6 05/07/2018     Albumin:   Albumin   Date Value Ref Range Status   05/07/2018 2.5 (L) 3.5 - 5.2 g/dL Final     Protein:   Total Protein   Date Value Ref Range Status   05/07/2018 5.0 (L) 6.0 - 8.4 g/dL Final     Lactic acid:   Lab Results   Component Value Date    LACTATE 6.2 (HH) 05/06/2018    LACTATE 8.5 (HH) 05/06/2018       Significant Imaging: see h and p    Advanced Directives::  Living Will: No  LaPOST: Yes  Do Not Resuscitate Status: Yes  Medical Power of : Yes. Agent's Name: Terrance.    Decision-Making Capacity: Family answered questions, Patient unable to communicate due to disease severity/cognitive impairment    Living Arrangements: Lives with family        Thank you for this consult and the opportunity to participate in Ms. Greer's care.    > 50% of 90 min visit spent in chart review, face to face discussion of goals of care,  symptom assessment, coordination of care and emotional support.    Mary Patrick MD  Palliative Medicine  [facility]  [place] [phone]

## 2018-05-09 ENCOUNTER — ANESTHESIA (OUTPATIENT)
Dept: INTENSIVE CARE | Facility: HOSPITAL | Age: 83
DRG: 871 | End: 2018-05-09
Payer: MEDICARE

## 2018-05-09 ENCOUNTER — ANESTHESIA EVENT (OUTPATIENT)
Dept: INTENSIVE CARE | Facility: HOSPITAL | Age: 83
DRG: 871 | End: 2018-05-09
Payer: MEDICARE

## 2018-05-09 LAB
ALBUMIN SERPL BCP-MCNC: 2.2 G/DL
ALLENS TEST: ABNORMAL
ALP SERPL-CCNC: 43 U/L
ALT SERPL W/O P-5'-P-CCNC: 15 U/L
ANION GAP SERPL CALC-SCNC: 8 MMOL/L
AST SERPL-CCNC: 28 U/L
BASOPHILS # BLD AUTO: 0.01 K/UL
BASOPHILS # BLD AUTO: ABNORMAL K/UL
BASOPHILS NFR BLD: 0 %
BASOPHILS NFR BLD: 0.1 %
BILIRUB SERPL-MCNC: 0.5 MG/DL
BLD PROD TYP BPU: NORMAL
BLOOD UNIT EXPIRATION DATE: NORMAL
BLOOD UNIT TYPE CODE: 6200
BLOOD UNIT TYPE: NORMAL
BUN SERPL-MCNC: 84 MG/DL
CALCIUM SERPL-MCNC: 9.1 MG/DL
CHLORIDE SERPL-SCNC: 110 MMOL/L
CO2 SERPL-SCNC: 23 MMOL/L
CODING SYSTEM: NORMAL
CREAT SERPL-MCNC: 1.5 MG/DL
CRP SERPL-MCNC: 19.8 MG/L
DELSYS: ABNORMAL
DIFFERENTIAL METHOD: ABNORMAL
DIFFERENTIAL METHOD: ABNORMAL
DISPENSE STATUS: NORMAL
EOSINOPHIL # BLD AUTO: 0 K/UL
EOSINOPHIL # BLD AUTO: ABNORMAL K/UL
EOSINOPHIL NFR BLD: 0 %
EOSINOPHIL NFR BLD: 0 %
ERYTHROCYTE [DISTWIDTH] IN BLOOD BY AUTOMATED COUNT: 16 %
ERYTHROCYTE [DISTWIDTH] IN BLOOD BY AUTOMATED COUNT: 17.7 %
ERYTHROCYTE [SEDIMENTATION RATE] IN BLOOD BY WESTERGREN METHOD: 9 MM/HR
EST. GFR  (AFRICAN AMERICAN): 46 ML/MIN/1.73 M^2
EST. GFR  (NON AFRICAN AMERICAN): 40 ML/MIN/1.73 M^2
FLOW: 1
GLUCOSE SERPL-MCNC: 122 MG/DL
HCO3 UR-SCNC: 21.3 MMOL/L (ref 24–28)
HCT VFR BLD AUTO: 17 %
HCT VFR BLD AUTO: 24.4 %
HGB BLD-MCNC: 5.7 G/DL
HGB BLD-MCNC: 8.1 G/DL
LACTATE SERPL-SCNC: 5.1 MMOL/L
LACTATE SERPL-SCNC: 5.9 MMOL/L
LYMPHOCYTES # BLD AUTO: 0.6 K/UL
LYMPHOCYTES # BLD AUTO: ABNORMAL K/UL
LYMPHOCYTES NFR BLD: 2 %
LYMPHOCYTES NFR BLD: 3.5 %
MAGNESIUM SERPL-MCNC: 1.9 MG/DL
MCH RBC QN AUTO: 31.8 PG
MCH RBC QN AUTO: 32.2 PG
MCHC RBC AUTO-ENTMCNC: 33.2 G/DL
MCHC RBC AUTO-ENTMCNC: 33.5 G/DL
MCV RBC AUTO: 96 FL
MCV RBC AUTO: 96 FL
MODE: ABNORMAL
MONOCYTES # BLD AUTO: 1.3 K/UL
MONOCYTES # BLD AUTO: ABNORMAL K/UL
MONOCYTES NFR BLD: 0 %
MONOCYTES NFR BLD: 7.5 %
NEUTROPHILS # BLD AUTO: 15.2 K/UL
NEUTROPHILS NFR BLD: 88.6 %
NEUTROPHILS NFR BLD: 95 %
NEUTS BAND NFR BLD MANUAL: 3 %
NRBC BLD-RTO: ABNORMAL /100 WBC
PCO2 BLDA: 32.1 MMHG (ref 35–45)
PH SMN: 7.43 [PH] (ref 7.35–7.45)
PHOSPHATE SERPL-MCNC: 2.1 MG/DL
PLATELET # BLD AUTO: 89 K/UL
PLATELET # BLD AUTO: 90 K/UL
PMV BLD AUTO: 11.7 FL
PMV BLD AUTO: 11.7 FL
PO2 BLDA: 122 MMHG (ref 80–100)
POC BE: -3 MMOL/L
POC SATURATED O2: 99 % (ref 95–100)
POC TCO2: 22 MMOL/L (ref 23–27)
POTASSIUM SERPL-SCNC: 4.1 MMOL/L
PROT SERPL-MCNC: 4.2 G/DL
RBC # BLD AUTO: 1.77 M/UL
RBC # BLD AUTO: 2.55 M/UL
SAMPLE: ABNORMAL
SITE: ABNORMAL
SODIUM SERPL-SCNC: 141 MMOL/L
TRANS ERYTHROCYTES VOL PATIENT: NORMAL ML
VANCOMYCIN SERPL-MCNC: 15.7 UG/ML
WBC # BLD AUTO: 14.35 K/UL
WBC # BLD AUTO: 17.1 K/UL

## 2018-05-09 PROCEDURE — G8996 SWALLOW CURRENT STATUS: HCPCS | Mod: CN

## 2018-05-09 PROCEDURE — 86140 C-REACTIVE PROTEIN: CPT

## 2018-05-09 PROCEDURE — 36415 COLL VENOUS BLD VENIPUNCTURE: CPT

## 2018-05-09 PROCEDURE — 84100 ASSAY OF PHOSPHORUS: CPT

## 2018-05-09 PROCEDURE — 85007 BL SMEAR W/DIFF WBC COUNT: CPT

## 2018-05-09 PROCEDURE — 11000001 HC ACUTE MED/SURG PRIVATE ROOM

## 2018-05-09 PROCEDURE — 85027 COMPLETE CBC AUTOMATED: CPT

## 2018-05-09 PROCEDURE — 36430 TRANSFUSION BLD/BLD COMPNT: CPT

## 2018-05-09 PROCEDURE — 27000221 HC OXYGEN, UP TO 24 HOURS

## 2018-05-09 PROCEDURE — P9021 RED BLOOD CELLS UNIT: HCPCS

## 2018-05-09 PROCEDURE — 63600175 PHARM REV CODE 636 W HCPCS: Performed by: FAMILY MEDICINE

## 2018-05-09 PROCEDURE — 85025 COMPLETE CBC W/AUTO DIFF WBC: CPT

## 2018-05-09 PROCEDURE — 92610 EVALUATE SWALLOWING FUNCTION: CPT

## 2018-05-09 PROCEDURE — 99233 SBSQ HOSP IP/OBS HIGH 50: CPT | Mod: ,,, | Performed by: NURSE PRACTITIONER

## 2018-05-09 PROCEDURE — 36000 PLACE NEEDLE IN VEIN: CPT | Performed by: ANESTHESIOLOGY

## 2018-05-09 PROCEDURE — 94761 N-INVAS EAR/PLS OXIMETRY MLT: CPT

## 2018-05-09 PROCEDURE — 83605 ASSAY OF LACTIC ACID: CPT

## 2018-05-09 PROCEDURE — G8997 SWALLOW GOAL STATUS: HCPCS | Mod: CM

## 2018-05-09 PROCEDURE — 85652 RBC SED RATE AUTOMATED: CPT

## 2018-05-09 PROCEDURE — 83605 ASSAY OF LACTIC ACID: CPT | Mod: 91

## 2018-05-09 PROCEDURE — 80053 COMPREHEN METABOLIC PANEL: CPT

## 2018-05-09 PROCEDURE — 83735 ASSAY OF MAGNESIUM: CPT

## 2018-05-09 PROCEDURE — 80202 ASSAY OF VANCOMYCIN: CPT

## 2018-05-09 RX ORDER — HYDROCODONE BITARTRATE AND ACETAMINOPHEN 500; 5 MG/1; MG/1
TABLET ORAL
Status: DISCONTINUED | OUTPATIENT
Start: 2018-05-09 | End: 2018-05-10

## 2018-05-09 RX ORDER — CEFEPIME HYDROCHLORIDE 2 G/50ML
2 INJECTION, SOLUTION INTRAVENOUS
Status: DISCONTINUED | OUTPATIENT
Start: 2018-05-10 | End: 2018-05-10

## 2018-05-09 RX ADMIN — BIMATOPROST 1 DROP: 0.1 SOLUTION/ DROPS OPHTHALMIC at 09:05

## 2018-05-09 RX ADMIN — CEFEPIME 2 G: 2 INJECTION, POWDER, FOR SOLUTION INTRAVENOUS at 08:05

## 2018-05-09 RX ADMIN — VANCOMYCIN HYDROCHLORIDE 750 MG: 750 INJECTION, POWDER, LYOPHILIZED, FOR SOLUTION INTRAVENOUS at 10:05

## 2018-05-09 RX ADMIN — HEPARIN SODIUM 5000 UNITS: 5000 INJECTION, SOLUTION INTRAVENOUS; SUBCUTANEOUS at 09:05

## 2018-05-09 NOTE — PHYSICIAN QUERY
"PT Name: Raghav Greer  MR #: [id number]    Physician Query Form - Nutrition Clarification     CDS: Minerva Zapata RN, CCDS         Contact information :ext [phone])  [email or web address]       This form is a permanent document in the medical record.     Query Date: May 9, 2018    By submitting this query, we are merely seeking further clarification of documentation.. Please utilize your independent clinical judgment when addressing the question(s) below.    The Medical record contains the following:   Indicators  Supporting Clinical Findings Location in Medical Record    % of Estimated Energy Intake over a time frame from p.o., TF, or TPN      Weight Status over a time frame      Subcutaneous Fat and/or Muscle Loss     x Fluid Accumulation or Edema "He exhibits edema" ED provider note 5/6/18    Reduced  Strength     x Wt / BMI / Usual Body Weight "% Ideal Body Weight, Male (lb): 77.46 lb  BMI (Calculated): 18  BMI Grade: 17 - 18.4 protein-energy malnutrition grade I " RD consult 5/7/18    Delayed Wound Healing / Failure to Thrive     x Acute or Chronic Illness "sepsis from left foot osteomyelitis"  "concern for sepsis"  HFpEF, Cachexia, HTN,"  "Stage 3 ulcer on sacrum, Unstageable ulcer to the lateral left foot with bony exposure, Poor turgor"  "pressure ulcer(s) (FT wound; Stage II PI)" Ortho consult 5/6/18    H&P 5/6/18        RD consult 5/7/18    Medication     x Treatment "Rec thiamin 100 mg 3-5 days with concerns for malnutrition. " RD consult 5/7/18   x Other "Inadequate energy intake r/t somnolence as evidenced by NPO"  "Poor po intake/dehydration PTA"  "Patient poor nutritional intake. SLP on board. Family does not want PEG at this point" RD consult 5/7/18      Progress note 5/8/18     AND / ASPEN Clinical Characteristics (October 2011)  A minimum of two characteristics is recommended for diagnosing either moderate or severe malnutrition   Mild Malnutrition Moderate Malnutrition Severe " Malnutrition   Energy Intake from p.o., TF or TPN. < 75% intake of estimated energy needs for less than 7 days < 75% intake of estimated energy needs for greater than 7 days < 50% intake of estimated energy needs for > 5 days   Weight Loss 1-2% in 1 month  5% in 3 months  7.5% in 6 months  10% in 1 year 1-2 % in 1 week  5% in 1 month  7.5% in 3 months  10% in 6 months  20% in 1 year > 2% in 1 week  > 5% in 1 month  > 7.5% in 3 months  > 10% in 6 months  > 20% in 1 year   Physical Findings     None *Mild subcutaneous fat and/or muscle loss  *Mild fluid accumulation  *Stage II decubitus  *Surgical wound or non-healing wound *Mod/severe subcutaneous fat and/or muscle loss  *Mod/severe fluid accumulation  *Stage III or IV decubitus  *Non-healing surgical wound     Provider, please specify diagnosis or diagnoses associated with above clinical findings.    [  ] Mild Protein-Calorie Malnutrition  [ x ] Moderate Protein-Calorie Malnutrition  [  ] Severe Protein-Calorie Malnutrition  [  ] Other Nutritional Diagnosis (please specify): ____________________________________  [  ] Other: ________________________________  [  ] Clinically Undetermined    Please document in your progress notes daily for the duration of treatment until resolved and include in your discharge summary.

## 2018-05-09 NOTE — PLAN OF CARE
Problem: Patient Care Overview  Goal: Plan of Care Review  Outcome: Ongoing (interventions implemented as appropriate)  Pt's SpO2 100% on 1 lpm NC. No respiratory distress noted. Will continue to monitor SpO2.

## 2018-05-09 NOTE — PT/OT/SLP PROGRESS
Physical Therapy      Patient Name:  Raghav Greer   MRN:  [id number]    Patient not seen today secondary to  attempted to eval pt with OT, pt mumbling and resisting all mvmt of BUE.  No family at bedside. In addition, H/H 5.7 and 17. Will follow-up     Jenelle Thrasher, PT   5/9/2018

## 2018-05-09 NOTE — PT/OT/SLP PROGRESS
Occupational Therapy      Patient Name:  Raghav Greer   MRN:  [id number]    Patient not seen today secondary to  attempted to eval pt, pt mumbling and resisting all mvmt of BUE.  No family at bedside. Will follow-up .    Bc Gray OT  5/9/2018

## 2018-05-09 NOTE — PHYSICIAN QUERY
"PT Name: Raghav Greer  MR #: [id number]     Physician Query Form - Documentation Clarification      CDS: Minerva Zapata RN, CCDS         Contact information :ext [phone])  [email or web address]       This form is a permanent document in the medical record.     Query Date: May 9, 2018    By submitting this query, we are merely seeking further clarification of documentation. Please utilize your independent clinical judgment when addressing the question(s) below.    The Medical record reflects the following:    Supporting Clinical Findings Location in Medical Record     "PMH Diabetes mellitus"    HgbA1C 5.0  Glucose 126-89  Point of care glucose     "Recommendation/Intervention:   1. Advance diet as able per SLP to 2 gm sodium; 2000 ADA   2. If unable to advance po diet and TF desired rec   - Glucerna 1.5 initiated @ 10 ml/hr and advanced 10 ml q 12 hrs to goal rate of 50 ml/hr"     H&P 5/6/18    Lab 5/6/18  Lab 5/6-5/9/18  Lab 5/7-5/9/18    RD consult 5/7/18                                                                                Doctor, Please specify diagnosis or diagnoses associated with above clinical findings.  Please clarify diagnosis Diabetes mellitus      Provider Use Only      ____Diabetes mellitus is a current diagnosis bieng monitored , evaluated and/or treated, please specify type: ____Type 2, ____Type 1, ___other, ____    __x_Diabetes mellitus is past medical history only    ___other clarification, _______                                                                                                             [  ] Clinically undetermined            "

## 2018-05-09 NOTE — PHYSICIAN QUERY
"PT Name: Raghav Greer  MR #: [id number]    Physician Query Form - Perfusion Diagnosis Clarification     CDS: Minerva Zapata RN, CCDS         Contact information :ext [phone])  [email or web address]     This form is a permanent document in the medical record.     Query Date: May 9, 2018    By submitting this query, we are merely seeking further clarification of documentation. Please utilize your independent clinical judgment when addressing the question(s) below.    The medical record contains the following:    Indicators   Supporting Clinical Findings   Location in Medical Record   x Acute Illness (e.g. AMI, Sepsis, etc.) "sepsis"  "dehydration"  "JEREMY" H&P 5/6/18  ED provider note 5/6/18   x Acidosis documented "BUN/Cr worsen today with worsening acidosis suspected from JEREMY from sepsis" Progress note 5/8/18    ABGs / Labs     x Vital Signs SBP   DBP 40-66 VS record 5/6/18@0548-2202   x Hypotension or Low Blood Pressure documented "Hypotension most likely due to little to no PO intake"  "initially hypotensive suspect from severe dehydration vs sepsis. Poor nutritional intake. Improve with IVF  - Now normotensive however continues to be tachycardic, 130s. Suspect from sepsis" H&P 5/6/18    Progress note 5/8/18   x Altered Mental Status or Confusion "presents with altered mental state. Per his son the reason for coming in today was becuase he wasn't as responsive as he normally is." ED provider note 5/6/18    Diaphoresis, Cold Extremities or Cyanosis      Oliguria     x Medication/Treatment:  -Vasopressors  -Inotropic Drugs  -IV Fluids  -Cardiac Assist Devices  -Hemodynamic Monitoring  -Blood/Blood Products sodium chloride 0.9% bolus 1,000 mL x2  sodium chloride 0.9% bolus 500 mL   sodium chloride 0.9% bolus 500 mL   1 unit PRBC   MAR 5/6/18    MAR 5/7/18  Blood bank result 5/7/18   x Other: "Patient hypotensive on initial presentation, responded to fluids, will hold all home HTN medications until " appropriate, Hypotension most likely due to little to no PO intake, no signs from hx or labs that patient was vomiting or having diarrhea Did have elevation in troponin, will rule out ACS on this admission. H&P 5/6/18                   Provider, please specify diagnosis or diagnoses associated with above clinical findings.      [  ] Septic Shock  [x  ] Hypovolemic Shock  [  ] Cardiogenic Shock  [  ] Other Shock (please specify): _________________________________  [  ] Shock Unspecified  [  ] Other Condition (please specify): ___________________________________  [  ] Clinically Undetermined    Please document in your progress notes daily for the duration of treatment until resolved and include in your discharge summary.

## 2018-05-09 NOTE — PROGRESS NOTES
Dr. Rizo was notified of patients shallow breaths and more lethargic, requested for ABGs. No critical changes in vitals. She was made aware of lab called to report critical H&H for hgb of 5.9. Lab was requested to redraw the cbc stat.   Per MD, will come to the bedside

## 2018-05-09 NOTE — PROGRESS NOTES
Patient's b/l ac  iv site infiltrated.  Dr. Worthington at the bedside was made aware and requested for anesthesia to be consulted since couple of the  nurses attempted to start an IV and it was unsuccessful. She was also made aware of Hgb of 5.7 after the redraw.  Per Dr. Rizo, will speak with the daughter to discuss the plan of care and will transfusion of 1 unit of PRBC.

## 2018-05-09 NOTE — PROGRESS NOTES
"Family Conference Note  Palliative Care      Requesting Physician:   Disease Process: Osteomyelitis of left foot            Patient/Family Members Present:  Tiffanie Greer (daughter and MPOA)  Dawson Greer (son)    Patient's Wishes Known From: MPOA: Tiffanie Greer     Provider Family Conference Notes:    Palliative care team met with family this am. Tiffanie (daughter/MPOA) and Wallace Greer (son). Family had concerns because they did not know anything going on with fathers condition and why this is occurring. Palliative team discussed patients current diagnosis and condition. Educated family on the disease process as it relates to the patient.     Palliative care discuss the wishes of the patient and family. Tiffanie stated, "My daddy did not want to be placed on a machine to live." Wallace (son) was in agreement with that statement. Tiffanie stated," I am not cutting my daddy's leg off and I want to do all I can to help him and when it is time [employer] [facility] will come get him."   Tiffanie expressed feeling guilty because she took care of patient for 10 years and did all she could to keep up with him. She verbalized, "I brought him to different doctors for his foot and the osteomyelitis but no one could help. I can't live with myself if I don't do everything to keep him alive."     Tiffanie was agreeable to hospice if the blood transfusions and antibiotics do not help patient. "I want to at least try." Wallace (son) stated, " I knew this was coming but she is not ready to let go." Tiffanie stated," I am not ready to let him go now. He always mumbled at me."    Palliative care educated and discussed comfort care with family. Again Tiffanie blurted out, "I am not going to just let him sit here and die."  Family stated they would like him to be artificially fed with TPN and possible PEG placement if necessary. Palliative care discussed Dementia and long term effects of this disease. The family was still wanting artifical " "feeding.     Palliative care discussed discharge option and Tiffanie stated, "I can't take care of him that way and I don't want him to die in my house. I can't stand to see him that way, it hurts me too much." She became tearful. Emotional support provided. They would like patient discharge to a facility instead of home.  All questions and concerns addressed.  Code status discussed with Tiffanie and stated,, "If his heart stops beating let him go, no medicines, no compressions, let him go."   Palliative left card with number should family have any more questions.     Primary team and Veronique Jameson (case management) notified of family wishes      Recommendations:  Continue current medical treatment  Code Status: Full DNR  Family discharge option to nursing facility for continued treatment and living.      Palliative care will continue to follow to assist with goals of care.           Time Spent:> 50% of  90 min. in visit spent in chart review, phone discussion of goals of care with family, symptom assessment, coordination of care and emotional support           Cassidy Brown, MSN, APRN, NP-C  Palliative  Medicine             Primary team and ca    "

## 2018-05-09 NOTE — PLAN OF CARE
[facility] Plan of Care    In discussion with family as well as reviewing patients chart, it seems as though he has been thoroughly worked up at [facility] for treatment of his foot wound and Vascular surgery is recommending a staged bypass with amputation.  We would defer to other services on how best to proceed with this wound at this point due to his vasculopathy and overall health.  Please call with any questions.      Micky Quintero MD

## 2018-05-09 NOTE — PT/OT/SLP EVAL
Speech Language Pathology Evaluation  Bedside Swallow    Patient Name:  Raghav Greer   MRN:  [id number]  Admitting Diagnosis: Osteomyelitis of left foot    Recommendations:                 General Recommendations:  Dysphagia therapy and Ongoing swallow assessment  Diet recommendations:  NPO, NPO   Aspiration Precautions: Strict aspiration precautions   General Precautions: Standard, NPO, fall, aspiration  Communication strategies:  pt with unintelligible speech, pt with inappropriate moaning and grunting    History:     Past Medical History:   Diagnosis Date    CHF (congestive heart failure)     Diabetes mellitus     Hypertension        History reviewed. No pertinent surgical history.    History of Present Illness:  Patient is a 89 y.o. male with a previous admission to this service with severe dehydration, HFpEF, recent admission to [facility] for Osteomyeloitis of left plantar region of great toe, HTN, presents to the ED because daughter in law states that when she attempted to feed him his eyes rolled in the back of his head and she thought he was dying. On presentation to the ED patient was lethargic, hypotensive and noted to have chronic unchanged wound on left plantar aspect of foot.   Patient received appropriate fluid resuscitation for SEPSIS and was started on broad spectrum abx.   Chart review from [facility] shows that patient was being treated for suspected Osteo with PICC line and abx, patient's daughter in law refused abx via PICC and was discharged by inpatient team at [facility] with po abx.   According to [facility] noted for Osteo, patient has sever PAD located in the internal illiac region due to high cardio pulmonary risk again daughter decided to see how well he would do with non invasive treatment. Angio was not done therefore no surgery on osteo location was done.     Social History: Patient lives with family.    Prior Intubation HX:  N/A    Modified Barium Swallow: None on file    Chest X-Rays: Cardiac and mediastinal  silhouettes are unchanged.  Mediastinal postoperative changes again noted.  Moderate size hiatal hernia again demonstrated.  Chronic appearing interstitial opacities noted in the bilateral lung apices, unchanged from prior.  No definite parenchymal consolidation or pleural effusion visualized.  Multilevel degenerative findings noted throughout the visualized spine.    Prior diet: Unknown at this time, however, per treatment team and EMR, pt with limited participation in PO intake prior to current admission.      Subjective     SLP consulted for clinical swallow eval. SLP confirmed with RN prior to entry. Per treatment team, pt's family want pt to participate in swallow eval.   Pt observed with impaired cognition/dcr'd TANYA, as pt with inappropriate vocalizations (untelligible speech with moaning and grunting) to all SLP cuing. Pt with eyes closes throughout session and did not open, despite max verbal and tactile cuing from SLP.    Patient goals: None stated by pt     Pain/Comfort:  · Pain Rating 1: 0/10    Objective:     Oral Musculature Evaluation  · Oral Musculature:  (unable to assess 2/2 pt's limited participation)  · Volitional Swallow:  (unable to assess 2/2 pt's limited participation)  · Voice Prior to PO Intake:  (pt observed with moaning and grunting, unintelligible speech)    Bedside Swallow Eval:   Pt with limited active participation in clinical swallow eval this AM. Pt observed with impaired cognition/dcr'd TANYA, which negatively impacts pt's safety swallowing. Pt observed to spit out ice chip trials and water trial via tsp dribbled out of mouth.   Consistencies Assessed:  · Thin liquids -ice chips via tsp x2, and water via tsp x1     Oral Phase:   · Anterior loss-- pt observed with anterior loss with water via tsp trial  · Decreased closure around utensil- all trials  · Orally expelled-- ice chips and water were either spit out or pt with anterior loss  · Lingual residue-- prior to pt spitting out ice  chips, pt was observed with ice chips to sit in oral cavity with limited manipulation/AP transfer  · Poor oral acceptance/awareness--across all trials  · Spitting out of food/liquid-- across all trials    Pharyngeal Phase:   · Unable to assess   · Pt did not trigger swallow reflex with limited PO trials     Compensatory Strategies  · None    Treatment: SLP educated pt on role of SLP, BSS, diet recs/swallow precautions and POC. However, 2/2 pt's dcr'd TANYA and impaired cognition, pt will require frequent repetitions/redirections.         Assessment:     Raghav Greer is a 89 y.o. male with an SLP diagnosis of Dysphagia, Dysarthria and Cognitive-Linguistic Impairment.  He presents with impaired cognition, dcr'd TANYA, and poor oral acceptance/awareness, all of which negatively impact pt's safety of swallowing.   SLP recs: Strict NPO, Pt is not safe for an oral diet, with consideration for alternative means of nutrition. MD team and GIOVANNY Gilbert notified of results/recs. SLP will f/u next date.      Goals:    SLP Goals        Problem: SLP Goal    Goal Priority Disciplines Outcome   SLP Goal     SLP Ongoing (interventions implemented as appropriate)   Description:  Short Term Goals:  1. Pt will participate in clinical swallow eval to determine least restrictive diet.-ongoing                        Plan:     · Patient to be seen:  3 x/week   · Plan of Care expires:  06/08/18  · Plan of Care reviewed with:  patient (MD Team, GIOVANNY Gilbert and Palliative care team)   · SLP Follow-Up:  Yes       Discharge recommendations:   (TBD)    Time Tracking:     SLP Treatment Date:   05/09/18  Speech Start Time:  1120  Speech Stop Time:  1129     Speech Total Time (min):  9 min    Billable Minutes: Eval Swallow and Oral Function 9    LOU Rincon, CF-SLP  Speech-Language Pathologist   5/9/2018

## 2018-05-09 NOTE — PHYSICIAN QUERY
"PT Name: Raghav Greer  MR #: [id number]     Physician Query Form - Documentation Clarification      CDS: Minerva Zapata RN, CCDS         Contact information :ext [phone])  [email or web address]       This form is a permanent document in the medical record.     Query Date: May 9, 2018    By submitting this query, we are merely seeking further clarification of documentation. Please utilize your independent clinical judgment when addressing the question(s) below.    The Medical record reflects the following:    Supporting Clinical Findings Location in Medical Record     "Stage 3 ulcer on sacrum"    "Skin: pressure ulcer(s) (FT wound; Stage II PI):"    "Sacral wound with dressing clean and dry"     H&P 5/6/18    RD consult 5/7/18      Palliative care 5/7/18                                                                                Doctor, Please specify diagnosis or diagnoses associated with above clinical findings.  Please clarify type and stage of Stage 3 ulcer on sacrum.    Provider Use Only        ____pressure (decubitus) sacral ulcer, stage 3    __x__pressure (decubitus) sacral ulcer, stage 2    ____other type/stage ulcer, please specify______    ____other clarification, _____                                                                                                               [  ] Clinically undetermined            "

## 2018-05-09 NOTE — PLAN OF CARE
Problem: SLP Goal  Goal: SLP Goal  Short Term Goals:  1. Pt will participate in clinical swallow eval to determine least restrictive diet.-ongoing      Outcome: Ongoing (interventions implemented as appropriate)  5/9: Pt with limited active participation in clinical swallow eval this AM. Pt observed with impaired cognition/dcr'd TANYA, which negatively impacts pt's safety swallowing. Pt also observed with limited verbal output, pt with inappropriate moaning and grunting. Pt observed to spit out ice chip trials and water trial via tsp dribbled out of mouth. SLP recs: NPO, Pt is not safe for an oral diet, with consideration for alternative means of nutrition. SLP will f/u next date.  LUIS ARMANDO Rincon., CF-SLP  Speech-Language Pathologist

## 2018-05-09 NOTE — ANESTHESIA PROCEDURE NOTES
Peripheral IV Insertion    Diagnosis: GI bleed  Doctor requesting consult: ICU    Patient location during procedure: ICU  Timeout: 5/9/2018 6:30 AM  Staffing  Anesthesiologist: BARRON ALEXIS  Performed: anesthesiologist Peripheral IV Insertion  Skin Prep: chlorhexidine gluconate  Local Infiltration: none  Orientation: right  Catheter Size: 18 GInsertion Attempts: 1  Assessment  Dressing: secured with tape and tegaderm  Patient: Tolerated well  Line flushed easily.

## 2018-05-09 NOTE — CONSULTS
Inpatient consult to Anesthesiology  Consult performed by: BARRON ALEXIS  Consult ordered by: KAIT SANCHEZ  Assessment/Recommendations: R ext jug IV 18ga inserted for transfusion/Rx

## 2018-05-09 NOTE — PROGRESS NOTES
Patient arrived from ICU, VSS, NAD noted, patient AAO to self, safety has been maintained, will continue to monitor.

## 2018-05-09 NOTE — PROGRESS NOTES
Lab unsuccessful at getting blood work orders, family medicine notified, patient refused heparin injection, will continue to monitor.

## 2018-05-09 NOTE — PROGRESS NOTES
"Vancomycin Dosing and Monitoring Pharmacy Protocol    Raghav Greer is a 89 y.o. male    Height: 5' 7" (1.702 m)   Wt Readings from Last 1 Encounters:   05/07/18 52 kg (114 lb 10.2 oz)     Ideal body weight: 66.1 kg (145 lb 11.6 oz)    Temp Readings from Last 3 Encounters:   05/08/18 98.4 °F (36.9 °C) (Axillary)   03/05/18 98 °F (36.7 °C) (Oral)   07/10/17 97.6 °F (36.4 °C) (Oral)      Lab Results   Component Value Date/Time    WBC 21.80 (H) 05/08/2018 03:36 AM    WBC 9.28 05/07/2018 04:00 AM    WBC 7.02 05/06/2018 02:45 PM      Lab Results   Component Value Date/Time    CREATININE 1.7 (H) 05/08/2018 03:36 AM    CREATININE 1.3 05/07/2018 04:00 AM    CREATININE 1.5 (H) 05/06/2018 02:45 PM        Serum creatinine: 1.7 mg/dL (H) 05/08/18 0336  Estimated creatinine clearance: 20 mL/min (A)    Antibiotics       Start     Stop Route Frequency Ordered    05/08/18 2200  vancomycin 750 mg in dextrose 5 % 250 mL IVPB (ready to mix system)      -- IV Once 05/08/18 2112    05/08/18 0830  ceFEPIme injection 2 g      -- IV Every 24 hours (non-standard times) 05/07/18 1556          Antifungals       None            Microbiology Results (last 7 days)       Procedure Component Value Units Date/Time    Blood culture x two cultures. Draw prior to antibiotics. [[id number]] Collected:  05/06/18 1704    Order Status:  Completed Specimen:  Blood from Peripheral, Antecubital, Left Updated:  05/08/18 2012     Blood Culture, Routine No Growth to date     Blood Culture, Routine No Growth to date     Blood Culture, Routine No Growth to date    Narrative:       Aerobic and anaerobic    Blood culture x two cultures. Draw prior to antibiotics. [[id number]] Collected:  05/06/18 1446    Order Status:  Completed Specimen:  Blood from Peripheral, Antecubital, Right Updated:  05/08/18 1812     Blood Culture, Routine No Growth to date     Blood Culture, Routine No Growth to date     Blood Culture, Routine No Growth to date    Narrative:       Aerobic and " anaerobic    Urine culture [[id number]] Collected:  18 2115    Order Status:  Completed Specimen:  Urine from Urine, Clean Catch Updated:  18 0338     Urine Culture, Routine No growth    Aerobic culture [[id number]]     Order Status:  Canceled Specimen:  Wound from Foot, Left     Aerobic culture [[id number]]     Order Status:  Canceled Specimen:  Wound from Foot, Left     Urine culture [[id number]]     Order Status:  Canceled Specimen:  Urine             Indication:   Bacteremia    Target Level: 15-20 mcg/ml    Hemodialysis:   No on N/A    Dosing Weight:   ABW--actual body weight  If ABW is greater than or equal to 30% over Ideal Body Weight, AdjBW will be used to calculate vancomycin dose.    Last Vancomycin dose: 750 mg   Date/Time given: 18 at 2256          Vancomycin level:  No results for input(s): VANCOMYCIN-TROUGH in the last 72 hours.  Recent Labs   Lab Result Units  18   1629   Vancomycin, Random ug/mL  12.9       Per Protocol Initial/Adjustments Dosin. Initial/Adjustment Dose: Vancomycin 750 mg IV X 1 dose tonight   2. Vancomycin Random Level will be drawn on 18 at 2100 date/time    Pharmacy will continue to follow.    Please contact if you have any further questions. Thank you.    Migue Rose, PharmD  [phone]

## 2018-05-10 VITALS
OXYGEN SATURATION: 100 % | RESPIRATION RATE: 23 BRPM | BODY MASS INDEX: 18.35 KG/M2 | DIASTOLIC BLOOD PRESSURE: 41 MMHG | TEMPERATURE: 98 F | SYSTOLIC BLOOD PRESSURE: 86 MMHG | WEIGHT: 116.88 LBS | HEIGHT: 67 IN | HEART RATE: 132 BPM

## 2018-05-10 PROBLEM — E43 SEVERE MALNUTRITION: Status: ACTIVE | Noted: 2018-05-10

## 2018-05-10 LAB
ABO + RH BLD: NORMAL
ALBUMIN SERPL BCP-MCNC: 2.3 G/DL
ALP SERPL-CCNC: 46 U/L
ALT SERPL W/O P-5'-P-CCNC: 16 U/L
ANION GAP SERPL CALC-SCNC: 6 MMOL/L
AST SERPL-CCNC: 21 U/L
BASOPHILS # BLD AUTO: 0.01 K/UL
BASOPHILS NFR BLD: 0.1 %
BILIRUB SERPL-MCNC: 0.9 MG/DL
BLD GP AB SCN CELLS X3 SERPL QL: NORMAL
BLD PROD TYP BPU: NORMAL
BLOOD UNIT EXPIRATION DATE: NORMAL
BLOOD UNIT TYPE CODE: 6200
BLOOD UNIT TYPE: NORMAL
BUN SERPL-MCNC: 94 MG/DL
CALCIUM SERPL-MCNC: 9.8 MG/DL
CHLORIDE SERPL-SCNC: 113 MMOL/L
CO2 SERPL-SCNC: 26 MMOL/L
CODING SYSTEM: NORMAL
CREAT SERPL-MCNC: 1.5 MG/DL
CRP SERPL-MCNC: 19.4 MG/L
DAT IGG-SP REAG RBC-IMP: NORMAL
DIFFERENTIAL METHOD: ABNORMAL
DISPENSE STATUS: NORMAL
EOSINOPHIL # BLD AUTO: 0 K/UL
EOSINOPHIL NFR BLD: 0.1 %
ERYTHROCYTE [DISTWIDTH] IN BLOOD BY AUTOMATED COUNT: 16.4 %
ERYTHROCYTE [SEDIMENTATION RATE] IN BLOOD BY WESTERGREN METHOD: 10 MM/HR
EST. GFR  (AFRICAN AMERICAN): 46 ML/MIN/1.73 M^2
EST. GFR  (NON AFRICAN AMERICAN): 40 ML/MIN/1.73 M^2
GLUCOSE SERPL-MCNC: 113 MG/DL
HAPTOGLOB SERPL-MCNC: 53 MG/DL
HCT VFR BLD AUTO: 18.8 %
HGB BLD-MCNC: 6.3 G/DL
LDH SERPL L TO P-CCNC: 205 U/L
LYMPHOCYTES # BLD AUTO: 1 K/UL
LYMPHOCYTES NFR BLD: 7.2 %
MAGNESIUM SERPL-MCNC: 2 MG/DL
MCH RBC QN AUTO: 32 PG
MCHC RBC AUTO-ENTMCNC: 33.5 G/DL
MCV RBC AUTO: 95 FL
MONOCYTES # BLD AUTO: 1 K/UL
MONOCYTES NFR BLD: 7.2 %
NEUTROPHILS # BLD AUTO: 11.7 K/UL
NEUTROPHILS NFR BLD: 85 %
PHOSPHATE SERPL-MCNC: 1.9 MG/DL
PLATELET # BLD AUTO: 78 K/UL
PMV BLD AUTO: 11.7 FL
POTASSIUM SERPL-SCNC: 4.4 MMOL/L
PROT SERPL-MCNC: 4.2 G/DL
RBC # BLD AUTO: 1.97 M/UL
RETICS/RBC NFR AUTO: 4.1 %
SODIUM SERPL-SCNC: 145 MMOL/L
TRANS ERYTHROCYTES VOL PATIENT: NORMAL ML
VANCOMYCIN SERPL-MCNC: 16.9 UG/ML
WBC # BLD AUTO: 13.79 K/UL

## 2018-05-10 PROCEDURE — 83735 ASSAY OF MAGNESIUM: CPT

## 2018-05-10 PROCEDURE — 99900038 HC OT GENERIC THERAPY SCREENING (STAT)

## 2018-05-10 PROCEDURE — 27000221 HC OXYGEN, UP TO 24 HOURS

## 2018-05-10 PROCEDURE — 83615 LACTATE (LD) (LDH) ENZYME: CPT

## 2018-05-10 PROCEDURE — 36415 COLL VENOUS BLD VENIPUNCTURE: CPT

## 2018-05-10 PROCEDURE — 63600175 PHARM REV CODE 636 W HCPCS: Performed by: FAMILY MEDICINE

## 2018-05-10 PROCEDURE — 80202 ASSAY OF VANCOMYCIN: CPT

## 2018-05-10 PROCEDURE — P9021 RED BLOOD CELLS UNIT: HCPCS

## 2018-05-10 PROCEDURE — 84100 ASSAY OF PHOSPHORUS: CPT

## 2018-05-10 PROCEDURE — 85025 COMPLETE CBC W/AUTO DIFF WBC: CPT

## 2018-05-10 PROCEDURE — 86140 C-REACTIVE PROTEIN: CPT

## 2018-05-10 PROCEDURE — 99233 SBSQ HOSP IP/OBS HIGH 50: CPT | Mod: ,,, | Performed by: NURSE PRACTITIONER

## 2018-05-10 PROCEDURE — 36430 TRANSFUSION BLD/BLD COMPNT: CPT

## 2018-05-10 PROCEDURE — 83010 ASSAY OF HAPTOGLOBIN QUANT: CPT

## 2018-05-10 PROCEDURE — 80053 COMPREHEN METABOLIC PANEL: CPT

## 2018-05-10 PROCEDURE — 86901 BLOOD TYPING SEROLOGIC RH(D): CPT

## 2018-05-10 PROCEDURE — 99900037 HC PT THERAPY SCREENING (STAT)

## 2018-05-10 PROCEDURE — 25000003 PHARM REV CODE 250: Performed by: STUDENT IN AN ORGANIZED HEALTH CARE EDUCATION/TRAINING PROGRAM

## 2018-05-10 PROCEDURE — 85652 RBC SED RATE AUTOMATED: CPT

## 2018-05-10 PROCEDURE — 85045 AUTOMATED RETICULOCYTE COUNT: CPT

## 2018-05-10 PROCEDURE — 94761 N-INVAS EAR/PLS OXIMETRY MLT: CPT

## 2018-05-10 PROCEDURE — 86880 COOMBS TEST DIRECT: CPT

## 2018-05-10 RX ORDER — SCOLOPAMINE TRANSDERMAL SYSTEM 1 MG/1
1 PATCH, EXTENDED RELEASE TRANSDERMAL
Status: DISCONTINUED | OUTPATIENT
Start: 2018-05-10 | End: 2018-05-11 | Stop reason: HOSPADM

## 2018-05-10 RX ORDER — HALOPERIDOL 5 MG/ML
1 INJECTION INTRAMUSCULAR EVERY 4 HOURS PRN
Status: DISCONTINUED | OUTPATIENT
Start: 2018-05-10 | End: 2018-05-11 | Stop reason: HOSPADM

## 2018-05-10 RX ORDER — METRONIDAZOLE 500 MG/100ML
500 INJECTION, SOLUTION INTRAVENOUS
Status: DISCONTINUED | OUTPATIENT
Start: 2018-05-10 | End: 2018-05-10

## 2018-05-10 RX ORDER — MORPHINE SULFATE ORAL SOLUTION 10 MG/5ML
2 SOLUTION ORAL
Status: DISCONTINUED | OUTPATIENT
Start: 2018-05-10 | End: 2018-05-11 | Stop reason: HOSPADM

## 2018-05-10 RX ORDER — LORAZEPAM 0.5 MG/1
0.5 TABLET ORAL EVERY 4 HOURS PRN
Status: DISCONTINUED | OUTPATIENT
Start: 2018-05-10 | End: 2018-05-11 | Stop reason: HOSPADM

## 2018-05-10 RX ADMIN — HEPARIN SODIUM 5000 UNITS: 5000 INJECTION, SOLUTION INTRAVENOUS; SUBCUTANEOUS at 05:05

## 2018-05-10 RX ADMIN — CEFEPIME HYDROCHLORIDE 2 G: 2 INJECTION, SOLUTION INTRAVENOUS at 03:05

## 2018-05-10 RX ADMIN — BIMATOPROST 1 DROP: 0.1 SOLUTION/ DROPS OPHTHALMIC at 08:05

## 2018-05-10 RX ADMIN — SODIUM CHLORIDE 1500 ML: 0.9 INJECTION, SOLUTION INTRAVENOUS at 08:05

## 2018-05-10 NOTE — ASSESSMENT & PLAN NOTE
Malnutrition in the context of Chronic Illness/Injury    Related to (etiology):  Condition related diagnoses; inability to consume adequate nutrition     Signs and Symptoms (as evidenced by):  Energy Intake: <75% of estimated energy requirement for 1 mos  Body Fat Depletion: severe depletion of orbitals, triceps and thoracic and lumbar region   Muscle Mass Depletion: severe depletion of temples, clavicle region, scapular region, interosseous muscle and lower extremities   Weight Loss: REJI    Interventions/Recommendations (treatment strategy):  See recs     Nutrition Diagnosis Status:  New

## 2018-05-10 NOTE — PT/OT/SLP PROGRESS
Occupational Therapy  Functional Screen/Discharge    Patient Name:  Raghav Greer   MRN:  [id number]    OT eval/treat orders received, chart reviewed.  Pt with no command following, minimal verbalizations, garbled speech when he does.  Pt not appropriate for OT at this time.    Bc Gray, OT  5/10/2018

## 2018-05-10 NOTE — PT/OT/SLP PROGRESS
Speech Language Pathology      Raghav Greer  MRN: [id number]    Patient not seen today secondary to MD hold. SLP spoke with MD Oleary and team about pt's POC. MD team stated pt is not medically appropriate to participate ongoing swallow assessment or PO trials at this time 2/2 pt's dcr'd TANYA, which negatively impacts pt's safety to swallow. SLP will follow-up next date.    LUIS ARMANDO Rincon., CF-SLP  Speech-Language Pathologist   5/10/2018

## 2018-05-10 NOTE — PROGRESS NOTES
TN met with Ms. Benjamin - Palliative Care NP   family open to IP hospice - ok to consult [facility]     TN spoke with pt's daughter in law Tiffanie - ok per Ms. Moreno to have rep fm [place] call her.   Molly atkinson #  [phone] to meet with Ms. Moreno today.      info sent per [facility] to [facility]

## 2018-05-10 NOTE — PLAN OF CARE
Problem: Occupational Therapy Goal  Goal: Occupational Therapy Goal  Outcome: Outcome(s) achieved Date Met: 05/10/18  Seen by OT, no command following at this time; not appropriate for OT.  Will DC OT

## 2018-05-10 NOTE — SUBJECTIVE & OBJECTIVE
Past Medical History:   Diagnosis Date    CHF (congestive heart failure)     Diabetes mellitus     Hypertension        History reviewed. No pertinent surgical history.    Review of patient's allergies indicates:  No Known Allergies    Medications:  Prescriptions Prior to Admission   Medication Sig    acetaminophen (TYLENOL) 325 MG tablet Take 325 mg by mouth every 6 (six) hours as needed for Pain.    amLODIPine (NORVASC) 10 MG tablet Take 10 mg by mouth once daily.    brimonidine 0.2% (ALPHAGAN) 0.2 % Drop 1 drop every 8 (eight) hours.    docusate sodium (COLACE) 100 MG capsule Take 100 mg by mouth once daily at 6am.    finasteride (PROSCAR) 5 mg tablet Take 5 mg by mouth once daily.    furosemide (LASIX) 40 MG tablet Take 1 tablet (40 mg total) by mouth daily as needed (cough with copious white mucous).    hydrocodone-acetaminophen 5-325mg (NORCO) 5-325 mg per tablet Take 1 tablet by mouth every 8 (eight) hours as needed for Pain.    metoprolol succinate (TOPROL-XL) 25 MG 24 hr tablet Take 1 tablet (25 mg total) by mouth once daily.    metoprolol tartrate (LOPRESSOR) 25 MG tablet Take 1 tablet (25 mg total) by mouth 2 (two) times daily.    metoprolol tartrate (LOPRESSOR) 25 MG tablet Take 1 tablet (25 mg total) by mouth 2 (two) times daily.    metoprolol tartrate (LOPRESSOR) 25 MG tablet Take 1 tablet (25 mg total) by mouth 2 (two) times daily.    mirtazapine (REMERON) 15 MG tablet Take 15 mg by mouth every evening.    omeprazole (PRILOSEC) 20 MG capsule Take 20 mg by mouth once daily.    polyvinyl alcohol, artificial tears, (LIQUIFILM TEARS) 1.4 % ophthalmic solution 1 drop as needed.    ranitidine (ZANTAC) 150 MG tablet Take 150 mg by mouth 2 (two) times daily.    sertraline (ZOLOFT) 50 MG tablet Take 25 mg by mouth every evening.     terazosin (HYTRIN) 5 MG capsule Take 5 mg by mouth every evening.    travoprost, benzalkonium, (TRAVATAN) 0.004 % ophthalmic solution 1 drop every evening.      Antibiotics     Start     Stop Route Frequency Ordered    05/10/18 0830  ceFEPIme in dextrose 5% 2 gram/50 mL IVPB 2 g      -- IV Every 24 hours (non-standard times) 05/09/18 1322        Antifungals     None        Antivirals     None             There is no immunization history on file for this patient.    Family History     None        Social History     Social History    Marital status:      Spouse name: N/A    Number of children: N/A    Years of education: N/A     Social History Main Topics    Smoking status: Unknown If Ever Smoked    Smokeless tobacco: None      Comment: Pt unable to answer questions.    Alcohol use No      Comment: Pt unable to answer questions.    Drug use: Unknown      Comment: Pt unable to answer questions.    Sexual activity: Not Asked      Comment: Pt unable to answer questions.     Other Topics Concern    None     Social History Narrative    None     Review of Systems   Unable to perform ROS: Patient nonverbal     Objective:     Vital Signs (Most Recent):  Temp: 96.2 °F (35.7 °C) (05/10/18 1240)  Pulse: (!) 133 (05/10/18 1240)  Resp: 20 (05/10/18 1240)  BP: (!) 80/47 (05/10/18 1240)  SpO2: 100 % (05/10/18 1240) Vital Signs (24h Range):  Temp:  [96.2 °F (35.7 °C)-98.1 °F (36.7 °C)] 96.2 °F (35.7 °C)  Pulse:  [] 133  Resp:  [18-22] 20  SpO2:  [94 %-100 %] 100 %  BP: ()/(47-78) 80/47     Weight: 53 kg (116 lb 13.5 oz)  Body mass index is 18.3 kg/m².    Estimated Creatinine Clearance: 23.1 mL/min (A) (based on SCr of 1.5 mg/dL (H)).    Physical Exam   Constitutional:   Cachectic, chronically ill appearing   HENT:   Head: Normocephalic and atraumatic.   Dry MM   Eyes: Conjunctivae and EOM are normal. Pupils are equal, round, and reactive to light. No scleral icterus.   Neck: No tracheal deviation present.   Cardiovascular: Regular rhythm and normal heart sounds.    No murmur heard.  tachycardic   Pulmonary/Chest: Effort normal and breath sounds normal. No  stridor. He has no wheezes. He has no rales.   Abdominal: Soft. Bowel sounds are normal. He exhibits no distension.   Genitourinary: Rectum normal and penis normal.   Genitourinary Comments: In diaper   Musculoskeletal: Normal range of motion. He exhibits no edema, tenderness or deformity.   L 5th MTP lateral surface with 3cm ulcer, no purulence or surrounding erythema   Neurological:   Nonverbal.  Follows command to close/open eyes.  Does not perform hand  or move extremities spontaneously.   Skin: Capillary refill takes less than 2 seconds. No rash noted. No erythema. No pallor.   BUE WWP, BLE cool        Significant Labs:   CBC:   Recent Labs  Lab 05/09/18  0624 05/09/18  1802 05/10/18  0554   WBC 14.35* 17.10* 13.79*   HGB 5.7* 8.1* 6.3*   HCT 17.0* 24.4* 18.8*   PLT 90* 89* 78*     CMP:   Recent Labs  Lab 05/09/18  0417 05/10/18  0554    145   K 4.1 4.4    113*   CO2 23 26   * 113*   BUN 84* 94*   CREATININE 1.5* 1.5*   CALCIUM 9.1 9.8   PROT 4.2* 4.2*   ALBUMIN 2.2* 2.3*   BILITOT 0.5 0.9   ALKPHOS 43* 46*   AST 28 21   ALT 15 16   ANIONGAP 8 6*   EGFRNONAA 40* 40*     Microbiology Results (last 7 days)     Procedure Component Value Units Date/Time    Blood culture x two cultures. Draw prior to antibiotics. [[id number]] Collected:  05/06/18 1704    Order Status:  Completed Specimen:  Blood from Peripheral, Antecubital, Left Updated:  05/09/18 2012     Blood Culture, Routine No Growth to date     Blood Culture, Routine No Growth to date     Blood Culture, Routine No Growth to date     Blood Culture, Routine No Growth to date    Narrative:       Aerobic and anaerobic    Blood culture x two cultures. Draw prior to antibiotics. [[id number]] Collected:  05/06/18 1446    Order Status:  Completed Specimen:  Blood from Peripheral, Antecubital, Right Updated:  05/09/18 1812     Blood Culture, Routine No Growth to date     Blood Culture, Routine No Growth to date     Blood Culture, Routine No Growth  to date     Blood Culture, Routine No Growth to date    Narrative:       Aerobic and anaerobic    Urine culture [[id number]] Collected:  05/06/18 2115    Order Status:  Completed Specimen:  Urine from Urine, Clean Catch Updated:  05/08/18 0338     Urine Culture, Routine No growth    Aerobic culture [[id number]]     Order Status:  Canceled Specimen:  Wound from Foot, Left     Aerobic culture [[id number]]     Order Status:  Canceled Specimen:  Wound from Foot, Left     Urine culture [[id number]]     Order Status:  Canceled Specimen:  Urine         All pertinent labs within the past 24 hours have been reviewed.    Significant Imaging:   Xray L foot: Findings in keeping with osteomyelitis involving the head of the 5th metacarpal and base of the 5th proximal phalanx suggesting septic arthritis at the 5th MCP joint.  Findings underlie an area of skin ulceration.    I have reviewed all pertinent imaging results/findings within the past 24 hours.

## 2018-05-10 NOTE — PROGRESS NOTES
Progress Note  [facility]  Admit Date: 5/6/2018   LOS: 4 days   SUBJECTIVE:   Follow-up For:    Patient seen and examined this AM. Pt moans incoherently when asked questions. Family is at bedside this morning. All questions answered.     ROS  Unable to assess 2/2 advanced dementia    OBJECTIVE:   Vital Signs (Most Recent)  Temp: 97.2 °F (36.2 °C) (05/10/18 1630)  Pulse: (!) 132 (05/10/18 1630)  Resp: (!) 22 (05/10/18 1630)  BP: (!) 114/53 (05/10/18 1630)  SpO2: 100 % (05/10/18 1630)    I & O (Last 24H):  Intake/Output Summary (Last 24 hours) at 05/10/18 1809  Last data filed at 05/10/18 0529   Gross per 24 hour   Intake               50 ml   Output                0 ml   Net               50 ml     Wt Readings from Last 3 Encounters:   05/10/18 53 kg (116 lb 13.5 oz)   03/01/18 53 kg (116 lb 13.5 oz)   07/09/17 57.3 kg (126 lb 5 oz)       Current Diet Order   No orders of the defined types were placed in this encounter.        Physical Exam  Gen: NAD, laying be bed with mouth and eyes open  HEENT: NC, AT  Chest: coarse breath sounds b/l   CVS: RRR, no m/r/g  Abdomen: soft, NT, ND, no masses, +BS  Ext: no edema, pulses 2+   Skin: ro rashes  Neuro: A&O x 3, CN's grossly intact, sensation intact to light touch  Psych: Normal mood, affect, thought content      Laboratory Data:  CBC    Recent Labs  Lab 05/09/18  0624 05/09/18  1802 05/10/18  0554   WBC 14.35* 17.10* 13.79*   RBC 1.77* 2.55* 1.97*   HGB 5.7* 8.1* 6.3*   HCT 17.0* 24.4* 18.8*   PLT 90* 89* 78*   MCV 96 96 95   MCH 32.2* 31.8* 32.0*   MCHC 33.5 33.2 33.5     CMP    Recent Labs  Lab 05/08/18  0336 05/09/18  0417 05/10/18  0554   CALCIUM 10.0 9.1 9.8   PROT 5.1* 4.2* 4.2*    141 145   K 4.9 4.1 4.4   CO2 18* 23 26    110 113*   BUN 72* 84* 94*   CREATININE 1.7* 1.5* 1.5*   ALKPHOS 57 43* 46*   ALT 16 15 16   AST 43* 28 21   BILITOT 2.0* 0.5 0.9     POCT-Glucose  POCT Glucose   Date Value Ref Range Status   05/08/2018 143 (H) 70 - 110 mg/dL  Final   05/08/2018 93 70 - 110 mg/dL Final   05/08/2018 94 70 - 110 mg/dL Final   05/07/2018 110 70 - 110 mg/dL Final   05/07/2018 63 (L) 70 - 110 mg/dL Final     COA    Recent Labs  Lab 05/06/18  1445   INR 1.6*     MICRO  Microbiology Results (last 7 days)     Procedure Component Value Units Date/Time    Blood culture x two cultures. Draw prior to antibiotics. [[id number]] Collected:  05/06/18 1704    Order Status:  Completed Specimen:  Blood from Peripheral, Antecubital, Left Updated:  05/09/18 2012     Blood Culture, Routine No Growth to date     Blood Culture, Routine No Growth to date     Blood Culture, Routine No Growth to date     Blood Culture, Routine No Growth to date    Narrative:       Aerobic and anaerobic    Blood culture x two cultures. Draw prior to antibiotics. [[id number]] Collected:  05/06/18 1446    Order Status:  Completed Specimen:  Blood from Peripheral, Antecubital, Right Updated:  05/09/18 1812     Blood Culture, Routine No Growth to date     Blood Culture, Routine No Growth to date     Blood Culture, Routine No Growth to date     Blood Culture, Routine No Growth to date    Narrative:       Aerobic and anaerobic    Urine culture [[id number]] Collected:  05/06/18 2115    Order Status:  Completed Specimen:  Urine from Urine, Clean Catch Updated:  05/08/18 0338     Urine Culture, Routine No growth    Aerobic culture [[id number]]     Order Status:  Canceled Specimen:  Wound from Foot, Left     Aerobic culture [[id number]]     Order Status:  Canceled Specimen:  Wound from Foot, Left     Urine culture [[id number]]     Order Status:  Canceled Specimen:  Urine         LIPID PANEL  Lab Results   Component Value Date    CHOL 193 07/10/2017     Lab Results   Component Value Date    HDL 57 07/10/2017     Lab Results   Component Value Date    LDLCALC 123.4 07/10/2017     Lab Results   Component Value Date    TRIG 63 07/10/2017     Lab Results   Component Value Date    CHOLHDL 29.5 07/10/2017          ASSESSMENT/PLAN:   Raghav Greer is a 89 y.o. male with advanced dementia here for recurrent osteomyelitis of left 5th MTP.    Recurrent osteomyelitis of left 5th MTP  Family does want to continue IV Abx but does not want amputations or any heroic measures done  Patient was previously discharged home (from [facility]) with PO Abx at family's request after Cx's were growing proteus and coag neg staph  ID consulted recs to continue Vanc and Cefepime, add flagyl  Recommending bone biopsy and repeat blood Cx's however family does not want aggressive Rx options  Poor prognosis given severe PAD disease as previously dx'd at [facility]    Advanced dementia  After several long discussions with palliative care and Family Med team, the pt's family has decided to pursue IP hospice options  Will coordinate with SW for hospice care    Dispo: Poor prognosis, f/u IP hospice options       5/10/2018 Noah Monroe MD  6:09 PM

## 2018-05-10 NOTE — PLAN OF CARE
Problem: Patient Care Overview  Goal: Plan of Care Review  Outcome: Ongoing (interventions implemented as appropriate)  Pt is awake and alert, but unable to establish orientation. Nonverbal indication of pain is not present. Pt has been on bedrest throughout the night. NPO. Tele [id number] and tachycardic and MD team is aware. Pt is a DNR code. Safety precautions maintained. Tolerated all medications well.

## 2018-05-10 NOTE — PLAN OF CARE
Problem: Physical Therapy Goal  Goal: Physical Therapy Goal  Outcome: Outcome(s) achieved Date Met: 05/10/18  Pt seen by PT; very minimal spontaneous movement, but not upon command; not appropriate for PT at this time.

## 2018-05-10 NOTE — ASSESSMENT & PLAN NOTE
89 y/o M with advanced dementia here with L 5th MTP osteomyelitis.  Prior bedside cultures from [facility] with proteus, CONS, staph lugdunensis and was treated with PO abx at family request.  Now here with FTT and currently NPO due to AMS.  No signs of disseminated infection, BCx NG.  Palliative consulted and still discussing goals of care with family.  At this point, they would like to proceed with antibiotics, but no surgical intervention.    - Continue vancomycin and cefepime.  Add metronidazole for anaerobic coverage.  - Recommend bone biopsy if family amenable.    - Low likelihood resolution of wound due to poor vascular supply.

## 2018-05-10 NOTE — PT/OT/SLP PROGRESS
Physical Therapy  Functional Screen/Discharge    Patient Name:  Raghav Greer   MRN:  [id number]  [phone]  PT eval/treat orders received and chart reviewed; pt found with head extended and open mouthed audible breathing; pt opened eyes to greeting, only garbled speech when he speaks which is minimal; observed pt spontaneously move LEs very minimally but not upon command; pt not appropriate for PT at this time.      Jenelle Thrasher, PT   5/10/2018

## 2018-05-10 NOTE — PROGRESS NOTES
" [facility]  Adult Nutrition  Progress Note    SUMMARY       Recommendations    1. If NGT inserted and TF desired, recommend Isosource 1.5 to a goal rate of 50mL/hr; additional fluid per MD. Provides 1800kcals, 81 g pro and 912mL fluid.   2. If medically able, rec Low Sodium diet with texture per SLP + Boost Plus TID.     RD to monitor.     Goals: Initiate nutrition within 72 hrs or per family goals of care  Nutrition Goal Status: goal not met  Communication of RD Recs:  (POC)    Reason for Assessment    Reason for Assessment: RD follow-up  Diagnosis:  (osteomyelitis)  Relevant Medical History: CHF, DM, HTN    General Information Comments: Pt seen this am, no family at bedside and pt nonverbal. NPO per SLP and currently on MD hold. NFPE completed.     Nutrition Discharge Planning: Unable to determine     Nutrition/Diet History    Food Preferences: REJI  Do you have any cultural, spiritual, Advent conflicts, given your current situation?:  (none)  Food Allergies: NKFA  Factors Affecting Nutritional Intake: NPO, difficulty/impaired swallowing    Anthropometrics    Temp: 96.2 °F (35.7 °C)  Height Method: Stated  Height: 5' 7" (170.2 cm)  Height (inches): 67 in  Weight Method: Bed Scale  Weight: 53 kg (116 lb 13.5 oz)  Weight (lb): 116.84 lb  Ideal Body Weight (IBW), Male: 148 lb  % Ideal Body Weight, Male (lb): 77.46 lb  BMI (Calculated): 18  BMI Grade: 17 - 18.4 protein-energy malnutrition grade I       Lab/Procedures/Meds    Pertinent Labs Reviewed: reviewed  Pertinent Labs Comments: WBC 13, Phos 1.9, Cl 113, BUN 94, Cr 1.5, GFR 46, CRP 19.4, Alb 2.3  Pertinent Medications Reviewed: reviewed  Pertinent Medications Comments: heparin    Physical Findings/Assessment    Overall Physical Appearance: advanced age, loss of muscle mass, loss of subcutaneous fat, generalized wasting, lethargic  Oral/Mouth Cavity: tooth/teeth missing  Skin: pressure ulcer(s) (FT wound; Stage II PI)    Estimated/Assessed " Needs    Weight Used For Calorie Calculations: 53 kg (116 lb 13.5 oz)  Energy Calorie Requirements (kcal): 8783-2481  Energy Need Method:  (30-35 kcals/kg)  Protein Requirements: 63-74g (1.2-1.4g/kg)  Weight Used For Protein Calculations: 53 kg (116 lb 13.5 oz)  Fluid Need Method: RDA Method  RDA Method (mL): 1590  CHO Requirement: 45-50%      Nutrition Prescription Ordered    Current Diet Order: NPO    Evaluation of Received Nutrient/Fluid Intake    I/O: +I/O   Energy Calories Required: not meeting needs  Protein Required: not meeting needs  Fluid Required: not meeting needs    % Intake of Estimated Energy Needs: 0 - 25 %  % Meal Intake: NPO    Nutrition Risk    Level of Risk/Frequency of Follow-up:  (2 x week)     Assessment and Plan    Severe malnutrition    Malnutrition in the context of Chronic Illness/Injury    Related to (etiology):  Condition related diagnoses; inability to consume adequate nutrition     Signs and Symptoms (as evidenced by):  Energy Intake: <75% of estimated energy requirement for 1 mos  Body Fat Depletion: severe depletion of orbitals, triceps and thoracic and lumbar region   Muscle Mass Depletion: severe depletion of temples, clavicle region, scapular region, interosseous muscle and lower extremities   Weight Loss: REJI    Interventions/Recommendations (treatment strategy):  See recs     Nutrition Diagnosis Status:  New                 Monitor and Evaluation    Food and Nutrient Intake: energy intake, food and beverage intake, enteral nutrition intake  Food and Nutrient Adminstration: diet order, enteral and parenteral nutrition administration  Knowledge/Beliefs/Attitudes: food and nutrition knowledge/skill  Physical Activity and Function: nutrition-related ADLs and IADLs  Anthropometric Measurements: weight, weight change, body mass index  Biochemical Data, Medical Tests and Procedures: electrolyte and renal panel, glucose/endocrine profile  Nutrition-Focused Physical Findings: overall  appearance, extremities, muscles and bones     Nutrition Follow-Up    RD Follow-up?: Yes

## 2018-05-10 NOTE — PLAN OF CARE
Problem: Patient Care Overview  Goal: Plan of Care Review  Recommendations    1. If NGT inserted and TF desired, recommend Isosource 1.5 to a goal rate of 50mL/hr; additional fluid per MD. Provides 1800kcals, 81 g pro and 912mL fluid.   2. If medically able, rec Low Sodium diet with texture per SLP + Boost Plus TID.     RD to monitor.     Goals: Initiate nutrition within 72 hrs or per family goals of care  Nutrition Goal Status: goal not met

## 2018-05-10 NOTE — HPI
Mr. 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  He lives at home with family and they report decreased PO intake over the past 2 weeks, then decreased responsiveness for a few days prior to admission.  Recent hospital stay for failure to thrive, and newly diagnosed osteomyelitis within the past month.    3/1-3/6 admission to [facility] for failure to thrive.  Discharged home with .  [facility].  Found to have acute osteo and transferred to [facility].  3/21-3/30 admission to [facility] for osteomyelitis of L 5th MTP.  Bedside debridement performed with cultures growing proteus mirabilis (pansensitive), CONS (R-tetracycline), staph lugdunensis (pansensitive).  LE angiogram showed L iliac occlusion and evaluated by Vascular Surgery who recommended bypass and eventual amputation - deemed high risk for procedure and family declined.  Initially patient treated with IV vancomycin, but family declined home PICC therapy and patient was discharged on extended course of PO ciprofloxacin 500mg BID + doxycycline 100mg BID.  5/6 presented to [facility] for decreased responsiveness.  Hypotensive in ICU, responded to fluids.  Evaluated by Ortho as chronic erosion of 5th MTP.  BCx no growth.  Started empirically on vanc/cefepime.  5/7 evaluated by Podiatry - family still wishes to avoid surgery.  Wound probes to bone.  Continue local wound care.

## 2018-05-10 NOTE — PLAN OF CARE
[facility]    FACILITY TRANSFER ORDERS      Patient Name: Raghav Greer  YOB: 1925    PCP: Anselmo Valente MD   PCP Address: [address] [place] [address]  PCP Phone Number: [phone]  PCP Fax: [phone]    Encounter Date: 05/10/2018    Admit [facility]: [facility]    Vital Signs:  Routine    Diagnoses:   Active Hospital Problems    Diagnosis  POA    *Advanced dementia [F03.90]  Yes    Severe malnutrition [E43]  Unknown    Palliative care encounter [Z51.5]  Not Applicable    Counseling regarding advanced care planning and goals of care [Z71.89]  Not Applicable    Anemia [D64.9]  Unknown    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     2 - Concentric remodeling.     3 - No wall motion abnormalities.     4 - Normal left ventricular systolic function (EF 55-60%).     5 - Impaired LV relaxation, elevated LAP (grade 2 diastolic dysfunction).     6 - Normal right ventricular systolic function .     7 - Pulmonary hypertension. The estimated PA systolic pressure is 43 mmHg.     8 - Mild tricuspid regurgitation.         Resolved Hospital Problems    Diagnosis Date Resolved POA   No resolved problems to display.       Allergies:Review of patient's allergies indicates:  No Known Allergies    Diet: npo    Activities: Bed rest    Nursing: none     Labs: none    CONSULTS:  none    MISCELLANEOUS CARE:  Ba Care: Empty Ba bag every shift. Change Ba every month. and Routine Skin for Bedridden Patients: Apply moisture barrier cream to all skin folds and wet areas in perineal area  daily and after baths and all bowel movements.    WOUND CARE ORDERS  Local wound care with santyl and aquacel foam dressing every other day per nursing.    Medications: Review discharge medications with patient and family and provide education.      Current Discharge Medication List      STOP taking these medications       acetaminophen (TYLENOL) 325 MG tablet Comments:   Reason for Stopping:         amLODIPine (NORVASC) 10 MG tablet Comments:   Reason for Stopping:         brimonidine 0.2% (ALPHAGAN) 0.2 % Drop Comments:   Reason for Stopping:         docusate sodium (COLACE) 100 MG capsule Comments:   Reason for Stopping:         finasteride (PROSCAR) 5 mg tablet Comments:   Reason for Stopping:         furosemide (LASIX) 40 MG tablet Comments:   Reason for Stopping:         hydrocodone-acetaminophen 5-325mg (NORCO) 5-325 mg per tablet Comments:   Reason for Stopping:         metoprolol succinate (TOPROL-XL) 25 MG 24 hr tablet Comments:   Reason for Stopping:         metoprolol tartrate (LOPRESSOR) 25 MG tablet Comments:   Reason for Stopping:         metoprolol tartrate (LOPRESSOR) 25 MG tablet Comments:   Reason for Stopping:         metoprolol tartrate (LOPRESSOR) 25 MG tablet Comments:   Reason for Stopping:         mirtazapine (REMERON) 15 MG tablet Comments:   Reason for Stopping:         omeprazole (PRILOSEC) 20 MG capsule Comments:   Reason for Stopping:         polyvinyl alcohol, artificial tears, (LIQUIFILM TEARS) 1.4 % ophthalmic solution Comments:   Reason for Stopping:         ranitidine (ZANTAC) 150 MG tablet Comments:   Reason for Stopping:         sertraline (ZOLOFT) 50 MG tablet Comments:   Reason for Stopping:         terazosin (HYTRIN) 5 MG capsule Comments:   Reason for Stopping:         travoprost, benzalkonium, (TRAVATAN) 0.004 % ophthalmic solution Comments:   Reason for Stopping:                    _________________________________  Migue Kilgore MD  05/10/2018

## 2018-05-10 NOTE — PROGRESS NOTES
Report given to nurse at [facility].  Instructed to they would rather have Rt. EJ kept in pt.  Called [facility]  about it, and  stated to keep Rt. EJ in pt for hospice.

## 2018-05-10 NOTE — PLAN OF CARE
pt for d/c today to [facility]   orders forwarded to [facility] spoke with pt's JEOVANNY Moreno - she agrees with this disposition.      transport per stretcher with O2 arranged with Samir Morrison  -  for 5:45 pm   nurse to call report to [facility] at 4:30 pm          05/10/18 3392   Final Note   Assessment Type Final Discharge Note   Discharge Disposition [facility]  ([facility] )   What phone number can be called within the next 1-3 days to see how you are doing after discharge? [phone]   Hospital Follow Up  Appt(s) scheduled? (na)   Discharge plans and expectations educations in teach back method with documentation complete? (na)   Right Care Referral Info   Post Acute Recommendation (no recc noted )   Referral Type (hospice )   Facility Name ([facility] )

## 2018-05-10 NOTE — CONSULTS
"[facility]    CC: anemia    HPI 89 y.o. male with a history of severe dementia, diastolic heart failure, atrial fibrilliation (not on anticoagulation), HTN, DM, PVD and recently failure to thrive and left 5th metatarsal osteomyelitis who presented to the ED for increased lethargy. Admitted for sepsis secondary to left toe osteomyelitis and started on broad spectrum antibiotics. However throughout his admission, his hemoglobin and hematocrit have been noted to be downtrending and yesterday he received 1 unit RBCs. No reported signs of overt blood loss including no hematemesis, melena, hematochezia. GI consulted for evaluation of anemia and possible endoscopy. Per the family, patient has a history of melanotic stools about 6 years ago [facility] presumed to be the source of bleeding. They do not wish to proceed with repeat endoscopy at this time.    History obtained from the family, primary team and chart review as the patient non-verbal/non-communicative.     Past Medical History:  Chronic Diastolic Heart Failure  HTN  DM  Severe Dementia  Atrial Fibrillation  PVD    Surgical History  Unable to obtain, as patient non-communicative and family no longer present    Social History: no history of KRISTI   Family History: negative for colorectal cancer per record       Review of Systems  General ROS:  +weight loss  Musculoskeletal ROS: + muscle weakness, + left foot pain  Other ROS unable to obtain due to dementia     Physical Examination  BP (!) 109/57 (BP Location: Left arm, Patient Position: Lying)   Pulse (!) 133   Temp 96.6 °F (35.9 °C) (Oral)   Resp (!) 22   Ht 5' 7" (1.702 m)   Wt 53 kg (116 lb 13.5 oz)   SpO2 100%   BMI 18.30 kg/m²   General appearance: awake, alert, noncooperative, cachetic male in no distress  HENT: Normocephalic, atraumatic, neck symmetrical, no nasal discharge   Eyes: conjunctivae/corneas clear, PERRL, EOM's intact  Lungs: clear to " auscultation bilaterally, no dullness to percussion bilaterally  Heart: tachycardic, regular rhythm without rub; no displacement of the PMI   Abdomen: soft, non-tender; bowel sounds normoactive; no organomegaly  Extremities: left foot edema and erythema, tender to palpation; no clubbing or cyanosis  Integument: Skin texture, turgor normal; no rashes; hair distrubution normal  Neurologic: awake, nonverbal, non communicative, nonambulatory; moves extremities x 4     Labs:  WBC: 13.7  H/H: 6.3/18.8  Platelets: 78  Retic: 4.1    Imagin18 Left Foot Xray:  Lytic changes of the left 5th metatarsal and proximal phalanx suggestive of osteomyelitis.    Assessment:   89 year old male with severe dementia, failure to thrive and sepsis secondary to acute osteomyelitis with anemia. [facility] with [facility] stools years ago. Anemia likely secondary to impaired RBC production rather than GI blood loss. There are no reports of melena or hematochezia recently.      Plan:   - Continue supportive care as family does not wish to pursue endoscopy at this time  - Agree with palliative care consult and family support in end of life care discussions  - Transfuse as needed

## 2018-05-10 NOTE — CONSULTS
[facility]  Infectious Disease  Consult Note    Patient Name: Raghav Greer  MRN: [id number]  Admission Date: 5/6/2018  Hospital Length of Stay: 4 days  Attending Physician: Severyn Yaroshevsky, MD  Primary Care Provider: Anselmo Valente MD     Isolation Status: No active isolations    Patient information was obtained from past medical records and ER records.      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  Prior bedside cultures from [facility] with proteus, CONS, staph lugdunensis and was treated with PO abx at family request.  Now here with FTT and currently NPO due to AMS.  No signs of disseminated infection, BCx NG.  Palliative consulted and still discussing goals of care with family.  At this point, they would like to proceed with antibiotics, but no surgical intervention.    - Continue vancomycin and cefepime.  Add metronidazole for anaerobic coverage.  - Recommend bone biopsy and repeat blood cultures if family wishes to pursue aggressive treatment options.    - Low likelihood resolution of wound due to poor vascular supply.            Thank you for your consult. I will follow-up with patient. Please contact us if you have any additional questions.    Abigail Tolbert MD  Infectious Disease  [facility]    Subjective:     Principal Problem: Osteomyelitis of left foot    HPI: Mr. 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  He lives at home with family and they report decreased PO intake over the past 2 weeks, then decreased responsiveness for a few days prior to admission.  Recent hospital stay for failure to thrive, and  newly diagnosed osteomyelitis within the past month.    3/1-3/6 admission to [facility] for failure to thrive.  Discharged home with HH.  [facility].  Found to have acute osteo and transferred to [facility].  3/21-3/30 admission to [facility] for osteomyelitis of L 5th MTP.  Bedside debridement performed with cultures growing proteus mirabilis (pansensitive), CONS (R-tetracycline), staph lugdunensis (pansensitive).  LE angiogram showed L iliac occlusion and evaluated by Vascular Surgery who recommended bypass and eventual amputation - deemed high risk for procedure and family declined.  Initially patient treated with IV vancomycin, but family declined home PICC therapy and patient was discharged on extended course of PO ciprofloxacin 500mg BID + doxycycline 100mg BID.  5/6 presented to [facility] for decreased responsiveness.  Hypotensive in ICU, responded to fluids.  Evaluated by Ortho as chronic erosion of 5th MTP.  BCx no growth.  Started empirically on vanc/cefepime.  5/7 evaluated by Podiatry - family still wishes to avoid surgery.  Wound probes to bone.  Continue local wound care.      Past Medical History:   Diagnosis Date    CHF (congestive heart failure)     Diabetes mellitus     Hypertension        History reviewed. No pertinent surgical history.    Review of patient's allergies indicates:  No Known Allergies    Medications:  Prescriptions Prior to Admission   Medication Sig    acetaminophen (TYLENOL) 325 MG tablet Take 325 mg by mouth every 6 (six) hours as needed for Pain.    amLODIPine (NORVASC) 10 MG tablet Take 10 mg by mouth once daily.    brimonidine 0.2% (ALPHAGAN) 0.2 % Drop 1 drop every 8 (eight) hours.    docusate sodium (COLACE) 100 MG capsule Take 100 mg by mouth once daily at 6am.    finasteride (PROSCAR) 5 mg tablet Take 5 mg by mouth once daily.    furosemide (LASIX) 40 MG tablet Take 1 tablet (40 mg total) by mouth daily as needed (cough with copious white mucous).     hydrocodone-acetaminophen 5-325mg (NORCO) 5-325 mg per tablet Take 1 tablet by mouth every 8 (eight) hours as needed for Pain.    metoprolol succinate (TOPROL-XL) 25 MG 24 hr tablet Take 1 tablet (25 mg total) by mouth once daily.    metoprolol tartrate (LOPRESSOR) 25 MG tablet Take 1 tablet (25 mg total) by mouth 2 (two) times daily.    metoprolol tartrate (LOPRESSOR) 25 MG tablet Take 1 tablet (25 mg total) by mouth 2 (two) times daily.    metoprolol tartrate (LOPRESSOR) 25 MG tablet Take 1 tablet (25 mg total) by mouth 2 (two) times daily.    mirtazapine (REMERON) 15 MG tablet Take 15 mg by mouth every evening.    omeprazole (PRILOSEC) 20 MG capsule Take 20 mg by mouth once daily.    polyvinyl alcohol, artificial tears, (LIQUIFILM TEARS) 1.4 % ophthalmic solution 1 drop as needed.    ranitidine (ZANTAC) 150 MG tablet Take 150 mg by mouth 2 (two) times daily.    sertraline (ZOLOFT) 50 MG tablet Take 25 mg by mouth every evening.     terazosin (HYTRIN) 5 MG capsule Take 5 mg by mouth every evening.    travoprost, benzalkonium, (TRAVATAN) 0.004 % ophthalmic solution 1 drop every evening.     Antibiotics     Start     Stop Route Frequency Ordered    05/10/18 0830  ceFEPIme in dextrose 5% 2 gram/50 mL IVPB 2 g      -- IV Every 24 hours (non-standard times) 05/09/18 1322        Antifungals     None        Antivirals     None             There is no immunization history on file for this patient.    Family History     None        Social History     Social History    Marital status:      Spouse name: N/A    Number of children: N/A    Years of education: N/A     Social History Main Topics    Smoking status: Unknown If Ever Smoked    Smokeless tobacco: None      Comment: Pt unable to answer questions.    Alcohol use No      Comment: Pt unable to answer questions.    Drug use: Unknown      Comment: Pt unable to answer questions.    Sexual activity: Not Asked      Comment: Pt unable to answer  questions.     Other Topics Concern    None     Social History Narrative    None     Review of Systems   Unable to perform ROS: Patient nonverbal     Objective:     Vital Signs (Most Recent):  Temp: 96.2 °F (35.7 °C) (05/10/18 1240)  Pulse: (!) 133 (05/10/18 1240)  Resp: 20 (05/10/18 1240)  BP: (!) 80/47 (05/10/18 1240)  SpO2: 100 % (05/10/18 1240) Vital Signs (24h Range):  Temp:  [96.2 °F (35.7 °C)-98.1 °F (36.7 °C)] 96.2 °F (35.7 °C)  Pulse:  [] 133  Resp:  [18-22] 20  SpO2:  [94 %-100 %] 100 %  BP: ()/(47-78) 80/47     Weight: 53 kg (116 lb 13.5 oz)  Body mass index is 18.3 kg/m².    Estimated Creatinine Clearance: 23.1 mL/min (A) (based on SCr of 1.5 mg/dL (H)).    Physical Exam   Constitutional:   Cachectic, chronically ill appearing   HENT:   Head: Normocephalic and atraumatic.   Dry MM   Eyes: Conjunctivae and EOM are normal. Pupils are equal, round, and reactive to light. No scleral icterus.   Neck: No tracheal deviation present.   Cardiovascular: Regular rhythm and normal heart sounds.    No murmur heard.  tachycardic   Pulmonary/Chest: Effort normal and breath sounds normal. No stridor. He has no wheezes. He has no rales.   Abdominal: Soft. Bowel sounds are normal. He exhibits no distension.   Genitourinary: Rectum normal and penis normal.   Genitourinary Comments: In diaper   Musculoskeletal: Normal range of motion. He exhibits no edema, tenderness or deformity.   L 5th MTP lateral surface with 3cm ulcer, no purulence or surrounding erythema   Neurological:   Nonverbal.  Follows command to close/open eyes.  Does not perform hand  or move extremities spontaneously.   Skin: Capillary refill takes less than 2 seconds. No rash noted. No erythema. No pallor.   BUE WWP, BLE cool        Significant Labs:   CBC:   Recent Labs  Lab 05/09/18  0624 05/09/18  1802 05/10/18  0554   WBC 14.35* 17.10* 13.79*   HGB 5.7* 8.1* 6.3*   HCT 17.0* 24.4* 18.8*   PLT 90* 89* 78*     CMP:   Recent Labs  Lab  05/09/18  0417 05/10/18  0554    145   K 4.1 4.4    113*   CO2 23 26   * 113*   BUN 84* 94*   CREATININE 1.5* 1.5*   CALCIUM 9.1 9.8   PROT 4.2* 4.2*   ALBUMIN 2.2* 2.3*   BILITOT 0.5 0.9   ALKPHOS 43* 46*   AST 28 21   ALT 15 16   ANIONGAP 8 6*   EGFRNONAA 40* 40*     Microbiology Results (last 7 days)     Procedure Component Value Units Date/Time    Blood culture x two cultures. Draw prior to antibiotics. [[id number]] Collected:  05/06/18 1704    Order Status:  Completed Specimen:  Blood from Peripheral, Antecubital, Left Updated:  05/09/18 2012     Blood Culture, Routine No Growth to date     Blood Culture, Routine No Growth to date     Blood Culture, Routine No Growth to date     Blood Culture, Routine No Growth to date    Narrative:       Aerobic and anaerobic    Blood culture x two cultures. Draw prior to antibiotics. [[id number]] Collected:  05/06/18 1446    Order Status:  Completed Specimen:  Blood from Peripheral, Antecubital, Right Updated:  05/09/18 1812     Blood Culture, Routine No Growth to date     Blood Culture, Routine No Growth to date     Blood Culture, Routine No Growth to date     Blood Culture, Routine No Growth to date    Narrative:       Aerobic and anaerobic    Urine culture [[id number]] Collected:  05/06/18 2115    Order Status:  Completed Specimen:  Urine from Urine, Clean Catch Updated:  05/08/18 0338     Urine Culture, Routine No growth    Aerobic culture [[id number]]     Order Status:  Canceled Specimen:  Wound from Foot, Left     Aerobic culture [[id number]]     Order Status:  Canceled Specimen:  Wound from Foot, Left     Urine culture [[id number]]     Order Status:  Canceled Specimen:  Urine         All pertinent labs within the past 24 hours have been reviewed.    Significant Imaging:   Xray L foot: Findings in keeping with osteomyelitis involving the head of the 5th metacarpal and base of the 5th proximal phalanx suggesting septic arthritis at the 5th MCP joint.   Findings underlie an area of skin ulceration.    I have reviewed all pertinent imaging results/findings within the past 24 hours.

## 2018-05-10 NOTE — PROGRESS NOTES
"Palliative Care Daily Progress Note     Palliative care met with Tiffanie (POA) and sister in law. Patient lethargic, sleeping with eyes open. Mouth breathing. Palliative care discussed goals of care with family. Tiffanie tearfully, "I hate to see him like that." Palliative care discussed patient disease process and treatment options.     Palliative care discussed hospice options inpatient vs home hospice. Tiffanie verbalized,"I can't take it if he dies at home, I can't stand to see him like that. My sister in law and I will go and discuss what we want and will give you an answer by 12noon    12:15pm: Tiffanie notified Palliative care of their decision to go with hospice care for patient.     1:15p Palliative care again educated the family of he different types of hospice services. Family became very tearful and emotional support was provided.  Family discharge option was inpatient hospice ([facility]).    Palliative care notified primary team and case management of family decision.     Recommendations:  Continue current medical treatment transitioning to comfort care.  Code Status: Full DNR  Family discharge option to Inpatient Hospice ([facility])      Palliative care will continue to follow to assist with goals of care.     Thank you for the opportunity to participate in Mr. Greer's care.      Time Spent:> 50% of  60 min. in visit spent in chart review, phone discussion of goals of care with family, symptom assessment, coordination of care and emotional support           Cassidy Brown, MSN, APRN, NP-C  Palliative  Medicine           "

## 2018-05-10 NOTE — PROGRESS NOTES
"Vancomycin Dosing and Monitoring Pharmacy Protocol    Raghav Greer is a 89 y.o. male    Height: 5' 7" (1.702 m)   Wt Readings from Last 1 Encounters:   05/07/18 52 kg (114 lb 10.2 oz)     Ideal body weight: 66.1 kg (145 lb 11.6 oz)    Temp Readings from Last 3 Encounters:   05/09/18 97.1 °F (36.2 °C) (Oral)   03/05/18 98 °F (36.7 °C) (Oral)   07/10/17 97.6 °F (36.4 °C) (Oral)      Lab Results   Component Value Date/Time    WBC 17.10 (H) 05/09/2018 06:02 PM    WBC 14.35 (H) 05/09/2018 06:24 AM    WBC 21.80 (H) 05/08/2018 03:36 AM      Lab Results   Component Value Date/Time    CREATININE 1.5 (H) 05/09/2018 04:17 AM    CREATININE 1.7 (H) 05/08/2018 03:36 AM    CREATININE 1.3 05/07/2018 04:00 AM        Serum creatinine: 1.5 mg/dL (H) 05/09/18 0417  Estimated creatinine clearance: 22.6 mL/min (A)    Antibiotics       Start     Stop Route Frequency Ordered    05/10/18 0830  ceFEPIme in dextrose 5% 2 gram/50 mL IVPB 2 g      -- IV Every 24 hours (non-standard times) 05/09/18 1322    05/09/18 2200  vancomycin 750 mg in dextrose 5 % 250 mL IVPB (ready to mix system)      -- IV Once 05/09/18 2150          Antifungals       None            Microbiology Results (last 7 days)       Procedure Component Value Units Date/Time    Blood culture x two cultures. Draw prior to antibiotics. [[id number]] Collected:  05/06/18 1704    Order Status:  Completed Specimen:  Blood from Peripheral, Antecubital, Left Updated:  05/09/18 2012     Blood Culture, Routine No Growth to date     Blood Culture, Routine No Growth to date     Blood Culture, Routine No Growth to date     Blood Culture, Routine No Growth to date    Narrative:       Aerobic and anaerobic    Blood culture x two cultures. Draw prior to antibiotics. [[id number]] Collected:  05/06/18 1446    Order Status:  Completed Specimen:  Blood from Peripheral, Antecubital, Right Updated:  05/09/18 1812     Blood Culture, Routine No Growth to date     Blood Culture, Routine No Growth to date "     Blood Culture, Routine No Growth to date     Blood Culture, Routine No Growth to date    Narrative:       Aerobic and anaerobic    Urine culture [[id number]] Collected:  18 2115    Order Status:  Completed Specimen:  Urine from Urine, Clean Catch Updated:  18 0338     Urine Culture, Routine No growth    Aerobic culture [[id number]]     Order Status:  Canceled Specimen:  Wound from Foot, Left     Aerobic culture [[id number]]     Order Status:  Canceled Specimen:  Wound from Foot, Left     Urine culture [[id number]]     Order Status:  Canceled Specimen:  Urine             Indication:   Bacteremia    Target Level: 15-20 mcg/ml    Hemodialysis:   No on N/A    Dosing Weight:   ABW--actual body weight  If ABW is greater than or equal to 30% over Ideal Body Weight, AdjBW will be used to calculate vancomycin dose.    Last Vancomycin dose: 750 mg   Date/Time given: 18 at 2150          Vancomycin level:  No results for input(s): VANCOMYCIN-TROUGH in the last 72 hours.  Recent Labs   Lab Result Units  18   1802   Vancomycin, Random ug/mL  15.7       Per Protocol Initial/Adjustments Dosin. Initial/Adjustment Dose: Vancomycin 750 mg IV X 1 dose tonight   2. Vancomycin Random Level will be drawn on 5/10/18 at 2100 date/time    Pharmacy will continue to follow.    Please contact if you have any further questions. Thank you.    Migue Rose, PharmD  [phone]

## 2018-05-11 PROBLEM — E43 SEVERE MALNUTRITION: Status: ACTIVE | Noted: 2018-05-11

## 2018-05-11 LAB
BACTERIA BLD CULT: NORMAL
BACTERIA BLD CULT: NORMAL

## 2018-05-11 NOTE — PLAN OF CARE
Problem: Patient Care Overview  Goal: Plan of Care Review  Outcome: Ongoing (interventions implemented as appropriate)  Pt disoriented x4.  On continuous pulse ox, tele, running sinus tachy.  1 Unit RBC transfused by GIOVANNY Tinajero.  Sacral wound stage 2 and lt. Foot wound.  NPO.  AMS.  Waiting on transport to [facility].

## 2018-05-11 NOTE — PROGRESS NOTES
Notified Dr. Rizo of pt's BP of 86/41 with a HR of 132. Pt is a DNR and awaiting a ride to be discharged to hospice. MD verbalized understanding and stated she will look at the chart and get back with me. NAD noted, will cont to monitor.

## 2023-07-08 NOTE — PLAN OF CARE
Problem: Patient Care Overview  Goal: Plan of Care Review  Pt on documented O2. No apparent distress noted. Will continue to monitor.         0

## 2025-08-25 ENCOUNTER — TELEPHONE (OUTPATIENT)
Dept: ENDOCRINOLOGY | Facility: CLINIC | Age: OVER 89
End: 2025-08-25
Payer: MEDICARE